# Patient Record
Sex: FEMALE | Race: WHITE | HISPANIC OR LATINO | Employment: OTHER | URBAN - METROPOLITAN AREA
[De-identification: names, ages, dates, MRNs, and addresses within clinical notes are randomized per-mention and may not be internally consistent; named-entity substitution may affect disease eponyms.]

---

## 2021-05-08 ENCOUNTER — OFFICE VISIT (OUTPATIENT)
Dept: URGENT CARE | Facility: CLINIC | Age: 61
End: 2021-05-08
Payer: MEDICARE

## 2021-05-08 VITALS
DIASTOLIC BLOOD PRESSURE: 80 MMHG | SYSTOLIC BLOOD PRESSURE: 144 MMHG | WEIGHT: 160 LBS | HEIGHT: 64 IN | TEMPERATURE: 98 F | HEART RATE: 93 BPM | RESPIRATION RATE: 14 BRPM | BODY MASS INDEX: 27.31 KG/M2 | OXYGEN SATURATION: 96 %

## 2021-05-08 DIAGNOSIS — J30.81 ALLERGIC RHINITIS DUE TO ANIMAL HAIR AND DANDER: Primary | ICD-10-CM

## 2021-05-08 LAB
CTP QC/QA: YES
SARS-COV-2 RDRP RESP QL NAA+PROBE: NEGATIVE

## 2021-05-08 PROCEDURE — 3008F BODY MASS INDEX DOCD: CPT | Mod: CPTII,S$GLB,, | Performed by: PHYSICIAN ASSISTANT

## 2021-05-08 PROCEDURE — 3008F PR BODY MASS INDEX (BMI) DOCUMENTED: ICD-10-PCS | Mod: CPTII,S$GLB,, | Performed by: PHYSICIAN ASSISTANT

## 2021-05-08 PROCEDURE — 99203 OFFICE O/P NEW LOW 30 MIN: CPT | Mod: S$GLB,CS,, | Performed by: PHYSICIAN ASSISTANT

## 2021-05-08 PROCEDURE — U0002 COVID-19 LAB TEST NON-CDC: HCPCS | Mod: QW,S$GLB,, | Performed by: PHYSICIAN ASSISTANT

## 2021-05-08 PROCEDURE — U0002: ICD-10-PCS | Mod: QW,S$GLB,, | Performed by: PHYSICIAN ASSISTANT

## 2021-05-08 PROCEDURE — 99203 PR OFFICE/OUTPT VISIT, NEW, LEVL III, 30-44 MIN: ICD-10-PCS | Mod: S$GLB,CS,, | Performed by: PHYSICIAN ASSISTANT

## 2021-05-08 RX ORDER — DENOSUMAB 60 MG/ML
60 INJECTION SUBCUTANEOUS
COMMUNITY
End: 2022-03-31

## 2021-05-08 RX ORDER — ATORVASTATIN CALCIUM 10 MG/1
10 TABLET, FILM COATED ORAL DAILY
COMMUNITY
End: 2022-03-31

## 2021-05-08 RX ORDER — ETANERCEPT 25 MG
25 KIT SUBCUTANEOUS WEEKLY
COMMUNITY
End: 2021-12-20 | Stop reason: CLARIF

## 2021-05-08 RX ORDER — FOLIC ACID 1 MG/1
1 TABLET ORAL DAILY
COMMUNITY
End: 2022-03-31

## 2021-05-08 RX ORDER — LISINOPRIL 10 MG/1
10 TABLET ORAL DAILY
COMMUNITY
End: 2022-06-13

## 2021-12-10 ENCOUNTER — HOSPITAL ENCOUNTER (OUTPATIENT)
Dept: RADIOLOGY | Facility: HOSPITAL | Age: 61
Discharge: HOME | End: 2021-12-10
Attending: INTERNAL MEDICINE
Payer: MEDICARE

## 2021-12-10 ENCOUNTER — OFFICE VISIT (OUTPATIENT)
Dept: RHEUMATOLOGY | Facility: CLINIC | Age: 61
End: 2021-12-10
Payer: MEDICARE

## 2021-12-10 VITALS
BODY MASS INDEX: 28.53 KG/M2 | HEART RATE: 74 BPM | SYSTOLIC BLOOD PRESSURE: 111 MMHG | HEIGHT: 64 IN | WEIGHT: 167.13 LBS | DIASTOLIC BLOOD PRESSURE: 70 MMHG

## 2021-12-10 DIAGNOSIS — M25.50 POLYARTHRALGIA: ICD-10-CM

## 2021-12-10 DIAGNOSIS — M25.50 POLYARTHRALGIA: Primary | ICD-10-CM

## 2021-12-10 DIAGNOSIS — R05.9 COUGH: ICD-10-CM

## 2021-12-10 DIAGNOSIS — R05.9 COUGH: Primary | ICD-10-CM

## 2021-12-10 DIAGNOSIS — M06.9 RHEUMATOID ARTHRITIS INVOLVING MULTIPLE JOINTS: ICD-10-CM

## 2021-12-10 DIAGNOSIS — M81.0 OSTEOPOROSIS, UNSPECIFIED OSTEOPOROSIS TYPE, UNSPECIFIED PATHOLOGICAL FRACTURE PRESENCE: ICD-10-CM

## 2021-12-10 PROCEDURE — 71046 X-RAY EXAM CHEST 2 VIEWS: CPT | Mod: 26,,, | Performed by: RADIOLOGY

## 2021-12-10 PROCEDURE — 71046 X-RAY EXAM CHEST 2 VIEWS: CPT | Mod: TC

## 2021-12-10 PROCEDURE — 4010F PR ACE/ARB THEARPY RXD/TAKEN: ICD-10-PCS | Mod: CPTII,S$GLB,, | Performed by: INTERNAL MEDICINE

## 2021-12-10 PROCEDURE — 77077 XR ARTHRITIS SURVEY: ICD-10-PCS | Mod: 26,,, | Performed by: RADIOLOGY

## 2021-12-10 PROCEDURE — 77077 JOINT SURVEY SINGLE VIEW: CPT | Mod: TC

## 2021-12-10 PROCEDURE — 71046 XR CHEST PA AND LATERAL: ICD-10-PCS | Mod: 26,,, | Performed by: RADIOLOGY

## 2021-12-10 PROCEDURE — 4010F ACE/ARB THERAPY RXD/TAKEN: CPT | Mod: CPTII,S$GLB,, | Performed by: INTERNAL MEDICINE

## 2021-12-10 PROCEDURE — 99999 PR PBB SHADOW E&M-EST. PATIENT-LVL IV: ICD-10-PCS | Mod: PBBFAC,,, | Performed by: INTERNAL MEDICINE

## 2021-12-10 PROCEDURE — 99204 OFFICE O/P NEW MOD 45 MIN: CPT | Mod: S$GLB,,, | Performed by: INTERNAL MEDICINE

## 2021-12-10 PROCEDURE — 99204 PR OFFICE/OUTPT VISIT, NEW, LEVL IV, 45-59 MIN: ICD-10-PCS | Mod: S$GLB,,, | Performed by: INTERNAL MEDICINE

## 2021-12-10 PROCEDURE — 99999 PR PBB SHADOW E&M-EST. PATIENT-LVL IV: CPT | Mod: PBBFAC,,, | Performed by: INTERNAL MEDICINE

## 2021-12-10 PROCEDURE — 77077 JOINT SURVEY SINGLE VIEW: CPT | Mod: 26,,, | Performed by: RADIOLOGY

## 2021-12-10 RX ORDER — METHOTREXATE 2.5 MG/1
7.5 TABLET ORAL
Qty: 36 TABLET | Refills: 0 | Status: SHIPPED | OUTPATIENT
Start: 2021-12-10 | End: 2022-03-10

## 2021-12-10 RX ORDER — GLUCOSAMINE/CHONDR SU A SOD 750-600 MG
1 TABLET ORAL
COMMUNITY

## 2021-12-10 RX ORDER — METOPROLOL TARTRATE 25 MG/1
1 TABLET, FILM COATED ORAL NIGHTLY
COMMUNITY
End: 2023-03-14 | Stop reason: SDUPTHER

## 2021-12-10 RX ORDER — ACETAMINOPHEN 500 MG
1 TABLET ORAL DAILY
COMMUNITY

## 2021-12-10 RX ORDER — MELATONIN 10 MG
1 CAPSULE ORAL
COMMUNITY
End: 2022-06-13

## 2021-12-10 RX ORDER — FOLIC ACID 1 MG/1
1 TABLET ORAL DAILY
Qty: 90 TABLET | Refills: 3 | Status: SHIPPED | OUTPATIENT
Start: 2021-12-10 | End: 2022-11-23

## 2021-12-10 RX ORDER — ETANERCEPT 50 MG/ML
50 SOLUTION SUBCUTANEOUS
Qty: 4 ML | Refills: 11 | OUTPATIENT
Start: 2021-12-10 | End: 2021-12-20 | Stop reason: SDUPTHER

## 2021-12-10 RX ORDER — ACETAMINOPHEN AND PHENYLEPHRINE HCL 325; 5 MG/1; MG/1
1 TABLET ORAL
COMMUNITY
End: 2024-02-22

## 2021-12-12 ENCOUNTER — PATIENT MESSAGE (OUTPATIENT)
Dept: RHEUMATOLOGY | Facility: CLINIC | Age: 61
End: 2021-12-12
Payer: MEDICARE

## 2021-12-13 DIAGNOSIS — M06.9 RHEUMATOID ARTHRITIS INVOLVING MULTIPLE JOINTS: Primary | ICD-10-CM

## 2021-12-20 ENCOUNTER — SPECIALTY PHARMACY (OUTPATIENT)
Dept: PHARMACY | Facility: CLINIC | Age: 61
End: 2021-12-20
Payer: MEDICARE

## 2021-12-20 DIAGNOSIS — M06.9 RHEUMATOID ARTHRITIS, INVOLVING UNSPECIFIED SITE, UNSPECIFIED WHETHER RHEUMATOID FACTOR PRESENT: Primary | ICD-10-CM

## 2021-12-20 RX ORDER — ETANERCEPT 50 MG/ML
50 SOLUTION SUBCUTANEOUS
Qty: 4 ML | Refills: 11 | Status: CANCELLED | OUTPATIENT
Start: 2021-12-20 | End: 2022-12-20

## 2021-12-20 RX ORDER — ETANERCEPT 50 MG/ML
50 SOLUTION SUBCUTANEOUS
Qty: 4 ML | Refills: 11 | Status: SHIPPED | OUTPATIENT
Start: 2021-12-20 | End: 2022-11-16 | Stop reason: SDUPTHER

## 2021-12-21 RX ORDER — ETANERCEPT 50 MG/ML
50 SOLUTION SUBCUTANEOUS
Qty: 4 ML | Refills: 11 | OUTPATIENT
Start: 2021-12-21 | End: 2022-12-21

## 2021-12-25 ENCOUNTER — PATIENT MESSAGE (OUTPATIENT)
Dept: ADMINISTRATIVE | Facility: OTHER | Age: 61
End: 2021-12-25
Payer: MEDICARE

## 2022-01-18 ENCOUNTER — SPECIALTY PHARMACY (OUTPATIENT)
Dept: PHARMACY | Facility: CLINIC | Age: 62
End: 2022-01-18
Payer: MEDICARE

## 2022-01-18 NOTE — TELEPHONE ENCOUNTER
Specialty Pharmacy - Refill Coordination    Specialty Medication Orders Linked to Encounter    Flowsheet Row Most Recent Value   Medication #1 etanercept (ENBREL SURECLICK) 50 mg/mL (1 mL) (Order#[id number], Rx#[id number])          Refill Questions - Documented Responses    Flowsheet Row Most Recent Value   Patient Availability and HIPAA Verification    Does patient want to proceed with activity? Yes   HIPAA/medical authority confirmed? Yes   Relationship to patient of person spoken to? Self   Refill Screening Questions    Changes to allergies? No   Changes to medications? No   New conditions since last clinic visit? No   Unplanned office visit, urgent care, ED, or hospital admission in the last 4 weeks? No   How does patient/caregiver feel medication is working? Too soon to tell   Financial problems or insurance changes? No   How many doses of your specialty medications were missed in the last 4 weeks? 0   Would patient like to speak to a pharmacist? No   When does the patient need to receive the medication? 01/26/22   Refill Delivery Questions    How will the patient receive the medication? Delivery Grace   When does the patient need to receive the medication? 01/26/22   Shipping Address Home   Address in [facility] confirmed and updated if neccessary? Yes   Expected Copay ($) 9.85   Is the patient able to afford the medication copay? Yes   Payment Method CC on file   Days supply of Refill 28   Supplies needed? No supplies needed   Refill activity completed? Yes   Refill activity plan Refill scheduled   Shipment/Pickup Date: 01/20/22          Current Outpatient Medications   Medication Sig    ascorbic acid, vitamin C, 1,000 mg TbSR Take 1 tablet by mouth.    atorvastatin (LIPITOR) 10 MG tablet Take 10 mg by mouth once daily.    biotin 10,000 mcg Cap Take 1 capsule by mouth.    cholecalciferol, vitamin D3, (VITAMIN D3) 50 mcg (2,000 unit) Cap Take 1 tablet by mouth.    denosumab (PROLIA) 60 mg/mL Syrg  Inject 60 mg into the skin.    etanercept (ENBREL SURECLICK) 50 mg/mL (1 mL) Inject 1 mL (50 mg total) into the skin every 7 days.    folic acid (FOLVITE) 1 MG tablet Take 1 mg by mouth once daily.    folic acid (FOLVITE) 1 MG tablet Take 1 tablet (1 mg total) by mouth once daily.    glucosamine HCl 1,500 mg Tab Take 1 tablet by mouth.    lisinopriL 10 MG tablet Take 10 mg by mouth once daily.    melatonin 10 mg Cap Take 1 capsule by mouth.    methotrexate (TREXALL) 10 MG tablet Take 10 mg by mouth.    methotrexate 2.5 MG Tab Take 3 tablets (7.5 mg total) by mouth every 7 days.    metoprolol tartrate (LOPRESSOR) 25 MG tablet Take 0.5 tablets by mouth.   Last reviewed on 12/10/2021 11:02 AM by Meenu Piedra MA    Review of patient's allergies indicates:  No Known Allergies Last reviewed on  12/10/2021 10:54 AM by Gama Piedra      Tasks added this encounter   2/16/2022 - Refill Call (Auto Added)   Tasks due within next 3 months   No tasks due.     Janett Horn, PharmD  [facility]  [address] [facility] [address]  Phone: [phone]  Fax: [phone]

## 2022-02-03 ENCOUNTER — TELEPHONE (OUTPATIENT)
Dept: RHEUMATOLOGY | Facility: CLINIC | Age: 62
End: 2022-02-03
Payer: MEDICARE

## 2022-02-03 DIAGNOSIS — M06.9 RHEUMATOID ARTHRITIS INVOLVING MULTIPLE JOINTS: Primary | ICD-10-CM

## 2022-02-03 NOTE — TELEPHONE ENCOUNTER
Left voicemail letting patient know Dr. Emmanuel has sent labs orders over to [facility].      ----- Message from Jeanine Emmanuel MD sent at 2/3/2022 11:57 AM CST -----  Ok labs sent to [facility].  ----- Message -----  From: Meenu Piedra MA  Sent: 2/3/2022  11:52 AM CST  To: Jeanine Emmanuel MD    Hi Dr. POLO patient stated that she wants to go to [facility] to get her labs done March 10  ----- Message -----  From: Jeanine Emmanuel MD  Sent: 2/3/2022   9:19 AM CST  To: Meenu Piedra MA    Tell her that her next labs are not due until march 10.  ORDERS PLACED.  DR. POLO  ----- Message -----  From: Meenu Piedra MA  Sent: 2/2/2022   3:46 PM CST  To: Jeanine Emmanuel MD    Please add labs for patient   ----- Message -----  From: Sarai Kunz  Sent: 2/2/2022   3:37 PM CST  To: [facility] Solorzano Staff    Pt wanted to schedule blood work not sure if Dr Chi wants labs done before appt or after Asking for  a call to schedule     [phone] (home)

## 2022-02-08 LAB — CRC RECOMMENDATION EXT: NORMAL

## 2022-02-16 ENCOUNTER — SPECIALTY PHARMACY (OUTPATIENT)
Dept: PHARMACY | Facility: CLINIC | Age: 62
End: 2022-02-16
Payer: MEDICARE

## 2022-02-16 NOTE — TELEPHONE ENCOUNTER
Specialty Pharmacy - Refill Coordination    Specialty Medication Orders Linked to Encounter    Flowsheet Row Most Recent Value   Medication #1 etanercept (ENBREL SURECLICK) 50 mg/mL (1 mL) (Order#[id number], Rx#[id number])          Refill Questions - Documented Responses    Flowsheet Row Most Recent Value   Patient Availability and HIPAA Verification    Does patient want to proceed with activity? Yes   HIPAA/medical authority confirmed? Yes   Relationship to patient of person spoken to? Self   Refill Screening Questions    Changes to allergies? No   Changes to medications? No   New conditions since last clinic visit? No   Unplanned office visit, urgent care, ED, or hospital admission in the last 4 weeks? No   How does patient/caregiver feel medication is working? Good   Financial problems or insurance changes? No   How many doses of your specialty medications were missed in the last 4 weeks? 0   Would patient like to speak to a pharmacist? No   When does the patient need to receive the medication? 02/23/22   Refill Delivery Questions    How will the patient receive the medication? Delivery Grace   When does the patient need to receive the medication? 02/23/22   Shipping Address Home   Address in [facility] confirmed and updated if neccessary? Yes   Expected Copay ($) 9.85   Is the patient able to afford the medication copay? Yes   Payment Method CC on file   Days supply of Refill 28   Supplies needed? No supplies needed   Refill activity completed? Yes   Refill activity plan Refill scheduled   Shipment/Pickup Date: 02/21/22          Current Outpatient Medications   Medication Sig    ascorbic acid, vitamin C, 1,000 mg TbSR Take 1 tablet by mouth.    atorvastatin (LIPITOR) 10 MG tablet Take 10 mg by mouth once daily.    biotin 10,000 mcg Cap Take 1 capsule by mouth.    cholecalciferol, vitamin D3, (VITAMIN D3) 50 mcg (2,000 unit) Cap Take 1 tablet by mouth.    denosumab (PROLIA) 60 mg/mL Syrg Inject 60 mg  into the skin.    etanercept (ENBREL SURECLICK) 50 mg/mL (1 mL) Inject 1 mL (50 mg total) into the skin every 7 days.    folic acid (FOLVITE) 1 MG tablet Take 1 mg by mouth once daily.    folic acid (FOLVITE) 1 MG tablet Take 1 tablet (1 mg total) by mouth once daily.    glucosamine HCl 1,500 mg Tab Take 1 tablet by mouth.    lisinopriL 10 MG tablet Take 10 mg by mouth once daily.    melatonin 10 mg Cap Take 1 capsule by mouth.    methotrexate (TREXALL) 10 MG tablet Take 10 mg by mouth.    methotrexate 2.5 MG Tab Take 3 tablets (7.5 mg total) by mouth every 7 days.    metoprolol tartrate (LOPRESSOR) 25 MG tablet Take 0.5 tablets by mouth.   Last reviewed on 12/10/2021 11:02 AM by Meenu Piedra MA    Review of patient's allergies indicates:  No Known Allergies Last reviewed on  12/10/2021 10:54 AM by Gama Piedra      Tasks added this encounter   3/16/2022 - Refill Call (Auto Added)   Tasks due within next 3 months   No tasks due.     [facility]  [address] [facility] [address]  Phone: [phone]  Fax: [phone]

## 2022-02-22 LAB — BCS RECOMMENDATION EXT: NORMAL

## 2022-03-11 LAB
ALBUMIN SERPL-MCNC: 4.5 G/DL (ref 3.6–5.1)
ALBUMIN/GLOB SERPL: 1.6 (CALC) (ref 1–2.5)
ALP SERPL-CCNC: 68 U/L (ref 37–153)
ALT SERPL-CCNC: 22 U/L (ref 6–29)
AST SERPL-CCNC: 18 U/L (ref 10–35)
BASOPHILS # BLD AUTO: 50 CELLS/UL (ref 0–200)
BASOPHILS NFR BLD AUTO: 0.6 %
BILIRUB SERPL-MCNC: 0.3 MG/DL (ref 0.2–1.2)
BUN SERPL-MCNC: 17 MG/DL (ref 7–25)
BUN/CREAT SERPL: NORMAL (CALC) (ref 6–22)
CALCIUM SERPL-MCNC: 9.9 MG/DL (ref 8.6–10.4)
CHLORIDE SERPL-SCNC: 103 MMOL/L (ref 98–110)
CO2 SERPL-SCNC: 28 MMOL/L (ref 20–32)
CREAT SERPL-MCNC: 0.66 MG/DL (ref 0.5–0.99)
CRP SERPL-MCNC: 0.4 MG/L
EOSINOPHIL # BLD AUTO: 332 CELLS/UL (ref 15–500)
EOSINOPHIL NFR BLD AUTO: 4 %
ERYTHROCYTE [DISTWIDTH] IN BLOOD BY AUTOMATED COUNT: 13.7 % (ref 11–15)
ERYTHROCYTE [SEDIMENTATION RATE] IN BLOOD BY WESTERGREN METHOD: 11 MM/H
GLOBULIN SER CALC-MCNC: 2.9 G/DL (CALC) (ref 1.9–3.7)
GLUCOSE SERPL-MCNC: 89 MG/DL (ref 65–99)
HCT VFR BLD AUTO: 38 % (ref 35–45)
HGB BLD-MCNC: 12.9 G/DL (ref 11.7–15.5)
LYMPHOCYTES # BLD AUTO: 2606 CELLS/UL (ref 850–3900)
LYMPHOCYTES NFR BLD AUTO: 31.4 %
MCH RBC QN AUTO: 30.8 PG (ref 27–33)
MCHC RBC AUTO-ENTMCNC: 33.9 G/DL (ref 32–36)
MCV RBC AUTO: 90.7 FL (ref 80–100)
MONOCYTES # BLD AUTO: 730 CELLS/UL (ref 200–950)
MONOCYTES NFR BLD AUTO: 8.8 %
NEUTROPHILS # BLD AUTO: 4582 CELLS/UL (ref 1500–7800)
NEUTROPHILS NFR BLD AUTO: 55.2 %
PLATELET # BLD AUTO: 240 THOUSAND/UL (ref 140–400)
PMV BLD REES-ECKER: 11.2 FL (ref 7.5–12.5)
POTASSIUM SERPL-SCNC: 4 MMOL/L (ref 3.5–5.3)
PROT SERPL-MCNC: 7.4 G/DL (ref 6.1–8.1)
RBC # BLD AUTO: 4.19 MILLION/UL (ref 3.8–5.1)
SODIUM SERPL-SCNC: 140 MMOL/L (ref 135–146)
WBC # BLD AUTO: 8.3 THOUSAND/UL (ref 3.8–10.8)

## 2022-03-16 ENCOUNTER — SPECIALTY PHARMACY (OUTPATIENT)
Dept: PHARMACY | Facility: CLINIC | Age: 62
End: 2022-03-16
Payer: MEDICARE

## 2022-03-16 NOTE — TELEPHONE ENCOUNTER
Specialty Pharmacy - Refill Coordination    Specialty Medication Orders Linked to Encounter    Flowsheet Row Most Recent Value   Medication #1 etanercept (ENBREL SURECLICK) 50 mg/mL (1 mL) (Order#[id number], Rx#[id number])          Refill Questions - Documented Responses    Flowsheet Row Most Recent Value   Patient Availability and HIPAA Verification    Does patient want to proceed with activity? Yes   HIPAA/medical authority confirmed? Yes   Relationship to patient of person spoken to? Self   Refill Screening Questions    Changes to allergies? No   Changes to medications? No   New conditions since last clinic visit? No   Unplanned office visit, urgent care, ED, or hospital admission in the last 4 weeks? No   How does patient/caregiver feel medication is working? Good   Financial problems or insurance changes? No   How many doses of your specialty medications were missed in the last 4 weeks? 0   Would patient like to speak to a pharmacist? No   When does the patient need to receive the medication? 03/23/22   Refill Delivery Questions    How will the patient receive the medication? Delivery Grace   When does the patient need to receive the medication? 03/23/22   Shipping Address Home   Address in [facility] confirmed and updated if neccessary? Yes   Expected Copay ($) 0   Is the patient able to afford the medication copay? Yes   Payment Method zero copay   Days supply of Refill 28   Supplies needed? No supplies needed   Refill activity completed? Yes   Refill activity plan Refill scheduled   Shipment/Pickup Date: 03/21/22          Current Outpatient Medications   Medication Sig    ascorbic acid, vitamin C, 1,000 mg TbSR Take 1 tablet by mouth.    atorvastatin (LIPITOR) 10 MG tablet Take 10 mg by mouth once daily.    biotin 10,000 mcg Cap Take 1 capsule by mouth.    cholecalciferol, vitamin D3, (VITAMIN D3) 50 mcg (2,000 unit) Cap Take 1 tablet by mouth.    denosumab (PROLIA) 60 mg/mL Syrg Inject 60 mg into  the skin.    etanercept (ENBREL SURECLICK) 50 mg/mL (1 mL) Inject 1 mL (50 mg total) into the skin every 7 days.    folic acid (FOLVITE) 1 MG tablet Take 1 mg by mouth once daily.    folic acid (FOLVITE) 1 MG tablet Take 1 tablet (1 mg total) by mouth once daily.    glucosamine HCl 1,500 mg Tab Take 1 tablet by mouth.    lisinopriL 10 MG tablet Take 10 mg by mouth once daily.    melatonin 10 mg Cap Take 1 capsule by mouth.    methotrexate (TREXALL) 10 MG tablet Take 10 mg by mouth.    metoprolol tartrate (LOPRESSOR) 25 MG tablet Take 0.5 tablets by mouth.   Last reviewed on 12/10/2021 11:02 AM by Meenu Piedra MA    Review of patient's allergies indicates:  No Known Allergies Last reviewed on  12/10/2021 10:54 AM by Gama Piedra      Tasks added this encounter   No tasks added.   Tasks due within next 3 months   3/16/2022 - Refill Call (Auto Added)     [facility]  [address] [place] [address]  Phone: [phone]  Fax: [phone]

## 2022-03-31 ENCOUNTER — OFFICE VISIT (OUTPATIENT)
Dept: RHEUMATOLOGY | Facility: CLINIC | Age: 62
End: 2022-03-31
Payer: MEDICARE

## 2022-03-31 VITALS
BODY MASS INDEX: 28.49 KG/M2 | HEART RATE: 74 BPM | DIASTOLIC BLOOD PRESSURE: 82 MMHG | WEIGHT: 166.88 LBS | HEIGHT: 64 IN | SYSTOLIC BLOOD PRESSURE: 121 MMHG

## 2022-03-31 DIAGNOSIS — M06.9 RHEUMATOID ARTHRITIS FLARE: Primary | ICD-10-CM

## 2022-03-31 PROCEDURE — 99214 OFFICE O/P EST MOD 30 MIN: CPT | Mod: S$GLB,,, | Performed by: INTERNAL MEDICINE

## 2022-03-31 PROCEDURE — 99999 PR PBB SHADOW E&M-EST. PATIENT-LVL IV: ICD-10-PCS | Mod: PBBFAC,,, | Performed by: INTERNAL MEDICINE

## 2022-03-31 PROCEDURE — 3074F PR MOST RECENT SYSTOLIC BLOOD PRESSURE < 130 MM HG: ICD-10-PCS | Mod: CPTII,S$GLB,, | Performed by: INTERNAL MEDICINE

## 2022-03-31 PROCEDURE — 3079F PR MOST RECENT DIASTOLIC BLOOD PRESSURE 80-89 MM HG: ICD-10-PCS | Mod: CPTII,S$GLB,, | Performed by: INTERNAL MEDICINE

## 2022-03-31 PROCEDURE — 3008F BODY MASS INDEX DOCD: CPT | Mod: CPTII,S$GLB,, | Performed by: INTERNAL MEDICINE

## 2022-03-31 PROCEDURE — 3008F PR BODY MASS INDEX (BMI) DOCUMENTED: ICD-10-PCS | Mod: CPTII,S$GLB,, | Performed by: INTERNAL MEDICINE

## 2022-03-31 PROCEDURE — 3079F DIAST BP 80-89 MM HG: CPT | Mod: CPTII,S$GLB,, | Performed by: INTERNAL MEDICINE

## 2022-03-31 PROCEDURE — 99214 PR OFFICE/OUTPT VISIT, EST, LEVL IV, 30-39 MIN: ICD-10-PCS | Mod: S$GLB,,, | Performed by: INTERNAL MEDICINE

## 2022-03-31 PROCEDURE — 3074F SYST BP LT 130 MM HG: CPT | Mod: CPTII,S$GLB,, | Performed by: INTERNAL MEDICINE

## 2022-03-31 PROCEDURE — 1159F MED LIST DOCD IN RCRD: CPT | Mod: CPTII,S$GLB,, | Performed by: INTERNAL MEDICINE

## 2022-03-31 PROCEDURE — 1159F PR MEDICATION LIST DOCUMENTED IN MEDICAL RECORD: ICD-10-PCS | Mod: CPTII,S$GLB,, | Performed by: INTERNAL MEDICINE

## 2022-03-31 PROCEDURE — 99999 PR PBB SHADOW E&M-EST. PATIENT-LVL IV: CPT | Mod: PBBFAC,,, | Performed by: INTERNAL MEDICINE

## 2022-03-31 RX ORDER — ATORVASTATIN CALCIUM 20 MG/1
20 TABLET, FILM COATED ORAL
COMMUNITY
End: 2023-03-14 | Stop reason: SDUPTHER

## 2022-03-31 RX ORDER — AMOXICILLIN 500 MG
1 CAPSULE ORAL
COMMUNITY
End: 2023-03-14 | Stop reason: DRUGHIGH

## 2022-03-31 RX ORDER — FOLIC ACID 1 MG/1
1 TABLET ORAL
COMMUNITY
End: 2022-03-31

## 2022-03-31 RX ORDER — ETANERCEPT 50 MG/ML
SOLUTION SUBCUTANEOUS
COMMUNITY
End: 2022-03-31

## 2022-03-31 RX ORDER — ADALIMUMAB 40MG/0.4ML
40 KIT SUBCUTANEOUS
Qty: 2 PEN | Refills: 11 | OUTPATIENT
Start: 2022-03-31 | End: 2022-03-31 | Stop reason: ALTCHOICE

## 2022-03-31 RX ORDER — LISINOPRIL 2.5 MG/1
5 TABLET ORAL DAILY
COMMUNITY
End: 2022-12-29 | Stop reason: SDUPTHER

## 2022-03-31 RX ORDER — ONDANSETRON 8 MG/1
TABLET, ORALLY DISINTEGRATING ORAL
COMMUNITY
Start: 2021-11-16 | End: 2024-02-22

## 2022-03-31 RX ORDER — MULTIVITAMIN
1 TABLET ORAL
COMMUNITY
End: 2023-03-14 | Stop reason: DRUGHIGH

## 2022-03-31 ASSESSMENT — ROUTINE ASSESSMENT OF PATIENT INDEX DATA (RAPID3)
TOTAL RAPID3 SCORE: 0.44
PAIN SCORE: 1
AM STIFFNESS SCORE: 0, NO
MDHAQ FUNCTION SCORE: 0.1
PATIENT GLOBAL ASSESSMENT SCORE: 0
PSYCHOLOGICAL DISTRESS SCORE: 1.1
FATIGUE SCORE: 0

## 2022-03-31 NOTE — PROGRESS NOTES
Subjective:       Patient ID: Earnestine Petit is a 61 y.o. female.    Chief Complaint: Disease Management    HPI 61 year old F with PMH of RA (around age 57), osteoporosis, palpitations here for evaluation.  When she was fist diagnosed with RA, she had involvement of shoulders, elbows, hands,wrists, knees, ankle, and feet.  She was started on MTX and prednisone at time of diagnosis.  She is taking MTX 3 pills once a week for a year and also on enbrel for over 3 years. She is taking folic acid 1 mg a day.  She has mild pain in right knee, right wrist, and some pain in hands with making a fist. She also has pain in feet. She reports she gets swelling in left second finger and other fingers on left hand. On occasion, her right knee will get swollen.  Resting improves the pain. Denies any rashes, oral ulcers, hair loss, fevers,or photosensitivity. She smoked when she was 14 just socially.      She is on prolia for a year and half for osteoporosis. She was on avista for 3 years.  She has had osteoporosis since age 52.    Family hx: sister: RA  Aunt: RA    Interval history: She is taking MTX 3 pills once a week. She is taking folic acid  1 mg a day.  She is on enbrel. She is having pain and swelling in both hands, worse on the left.  She is also having swelling in the feet.      Past Medical History:   Diagnosis Date    Arthritis     RH    Hyperlipidemia     Hypertension     Osteoporosis        Review of Systems   Constitutional: Negative for activity change, appetite change, chills, diaphoresis, fatigue, fever and unexpected weight change.   HENT: Negative for congestion, ear discharge, ear pain, facial swelling, mouth sores, sinus pressure, sneezing, sore throat, tinnitus and trouble swallowing.    Eyes: Negative for photophobia, pain, discharge, redness, itching and visual disturbance.   Respiratory: Negative for apnea, cough, chest tightness, shortness of breath, wheezing and stridor.    Cardiovascular: Negative  for chest pain and leg swelling.   Gastrointestinal: Negative for abdominal distention, abdominal pain, anal bleeding, blood in stool, constipation, diarrhea and nausea.   Endocrine: Negative for cold intolerance and heat intolerance.   Genitourinary: Negative for difficulty urinating, dysuria and genital sores.   Musculoskeletal: Positive for arthralgias. Negative for back pain, gait problem, joint swelling, myalgias, neck pain and neck stiffness.   Skin: Negative for color change, pallor, rash and wound.   Neurological: Negative for dizziness, seizures, light-headedness, numbness and headaches.   Hematological: Negative for adenopathy. Does not bruise/bleed easily.   Psychiatric/Behavioral: Negative for sleep disturbance. The patient is not nervous/anxious.            Objective:        Physical Exam   Constitutional: She is oriented to person, place, and time.   HENT:   Head: Normocephalic and atraumatic.   Right Ear: External ear normal.   Left Ear: External ear normal.   Nose: Nose normal.   Mouth/Throat: Oropharynx is clear and moist. No oropharyngeal exudate.   Eyes: Conjunctivae and EOM are normal. Pupils are equal, round, and reactive to light. Right eye exhibits no discharge. Left eye exhibits no discharge. No scleral icterus.   Neck: No JVD present. No thyromegaly present.   Cardiovascular: Normal rate, regular rhythm, normal heart sounds and intact distal pulses.  Exam reveals no gallop and no friction rub.    No murmur heard.  Pulmonary/Chest: Effort normal and breath sounds normal. No respiratory distress. She has no wheezes. She has no rales. She exhibits no tenderness.   Abdominal: Soft. Bowel sounds are normal. She exhibits no distension and no mass. There is no abdominal tenderness. There is no rebound and no guarding.   Lymphadenopathy:     She has no cervical adenopathy.   Neurological: She is alert and oriented to person, place, and time. No cranial nerve deficit. Gait normal. Coordination  normal.   Skin: Skin is dry. No rash noted. No erythema. No pallor.     Psychiatric: Affect and judgment normal.   Musculoskeletal: Deformity present. No tenderness or edema.           Assessment:     61 year old F with PMH of RA (around age 57), osteoporosis, palpitations here for evaluation.   She is on MTX 3 pills once a week and enbrel and is flaring. I discussed switching to Humira but she declines and understands risk of progressive joint damage.    Plan:       Problem List Items Addressed This Visit    None       Labs  Offered steroids but she declined  Continue MTX 3 pills once week (Risks and benefits of initiating MTX discussed. Patient aware that risks include and not limited to lung toxicity, liver abnormalities, cell count abnormalities, malignancy, and allergic  reaction to medication. Patient agrees with starting medication.  Continue folic acid 1 mg po qday    *  30 * minutes of total time spent on the encounter, which includes face to face time and non-face to face time preparing to see the patient (eg, review of tests), Obtaining and/or reviewing separately obtained history, Documenting clinical information in the electronic or other health record, Independently interpreting results (not separately reported) and communicating results to the patient/family/caregiver, or Care coordination (not separately reported).

## 2022-03-31 NOTE — PROGRESS NOTES
Rapid3 Question Responses and Scores 3/30/2022   MDHAQ Score 0.1   Psychologic Score 1.1   Pain Score 1   When you awakened in the morning OVER THE LAST WEEK, did you feel stiff? No   If Yes, please indicate the number of hours until you are as limber as you will be for the day -   Fatigue Score 0   Global Health Score 0   RAPID3 Score 0.44     Answers for HPI/ROS submitted by the patient on 3/30/2022  fever: No  eye redness: No  mouth sores: No  headaches: No  shortness of breath: No  chest pain: No  trouble swallowing: No  diarrhea: No  constipation: No  unexpected weight change: No  genital sore: No  dysuria: No  During the last 3 days, have you had a skin rash?: No  Bruises or bleeds easily: No  cough: No

## 2022-04-01 ENCOUNTER — PATIENT MESSAGE (OUTPATIENT)
Dept: RHEUMATOLOGY | Facility: CLINIC | Age: 62
End: 2022-04-01
Payer: MEDICARE

## 2022-04-01 DIAGNOSIS — M06.9 RHEUMATOID ARTHRITIS FLARE: Primary | ICD-10-CM

## 2022-04-14 ENCOUNTER — SPECIALTY PHARMACY (OUTPATIENT)
Dept: PHARMACY | Facility: CLINIC | Age: 62
End: 2022-04-14
Payer: MEDICARE

## 2022-04-14 ENCOUNTER — PATIENT MESSAGE (OUTPATIENT)
Dept: RHEUMATOLOGY | Facility: CLINIC | Age: 62
End: 2022-04-14
Payer: MEDICARE

## 2022-04-14 ENCOUNTER — PATIENT MESSAGE (OUTPATIENT)
Dept: PHARMACY | Facility: CLINIC | Age: 62
End: 2022-04-14
Payer: MEDICARE

## 2022-04-14 DIAGNOSIS — M06.9 RHEUMATOID ARTHRITIS, INVOLVING UNSPECIFIED SITE, UNSPECIFIED WHETHER RHEUMATOID FACTOR PRESENT: Primary | ICD-10-CM

## 2022-04-14 NOTE — TELEPHONE ENCOUNTER
Specialty Pharmacy - Refill Coordination    Specialty Medication Orders Linked to Encounter    Flowsheet Row Most Recent Value   Medication #1 etanercept (ENBREL SURECLICK) 50 mg/mL (1 mL) (Order#[id number], Rx#[id number])          Refill Questions - Documented Responses    Flowsheet Row Most Recent Value   Patient Availability and HIPAA Verification    Does patient want to proceed with activity? Yes   HIPAA/medical authority confirmed? Yes   Relationship to patient of person spoken to? Self   Refill Screening Questions    Changes to allergies? No   Changes to medications? No   New conditions since last clinic visit? No   Unplanned office visit, urgent care, ED, or hospital admission in the last 4 weeks? No   How does patient/caregiver feel medication is working? Good   Financial problems or insurance changes? No   How many doses of your specialty medications were missed in the last 4 weeks? 0   Would patient like to speak to a pharmacist? No   When does the patient need to receive the medication? 04/20/22   Refill Delivery Questions    How will the patient receive the medication? Delivery Grace   When does the patient need to receive the medication? 04/20/22   Shipping Address Home   Address in [facility] confirmed and updated if neccessary? Yes   Expected Copay ($) 0   Is the patient able to afford the medication copay? Yes   Payment Method zero copay   Days supply of Refill 28   Supplies needed? No supplies needed   Refill activity completed? Yes   Refill activity plan Refill scheduled   Shipment/Pickup Date: 04/19/22          Current Outpatient Medications   Medication Sig    ascorbic acid, vitamin C, 1,000 mg TbSR Take 1 tablet by mouth.    atorvastatin (LIPITOR) 20 MG tablet Take 20 mg by mouth.    biotin 10,000 mcg Cap Take 1 capsule by mouth.    cholecalciferol, vitamin D3, (VITAMIN D3) 50 mcg (2,000 unit) Cap Take 1 tablet by mouth.    denosumab (PROLIA) 60 mg/mL Syrg Inject 60 mg into the skin.     etanercept (ENBREL SURECLICK) 50 mg/mL (1 mL) Inject 1 mL (50 mg total) into the skin every 7 days.    ferrous fumarate/vit Bcomp,C (SUPER B COMPLEX ORAL) Take 1 tablet by mouth.    FLAXSEED ORAL 1 (one) time each day    folic acid (FOLVITE) 1 MG tablet Take 1 tablet (1 mg total) by mouth once daily.    glucosamine HCl 1,500 mg Tab Take 1 tablet by mouth.    INOSITOL ORAL Take by mouth.    Lactobac no.41/Bifidobact no.7 (PROBIOTIC-10 ORAL) Take 1 capsule by mouth.    lisinopriL (PRINIVIL,ZESTRIL) 2.5 MG tablet Take 2.5 mg by mouth once daily.    lisinopriL 10 MG tablet Take 10 mg by mouth once daily.    melatonin 10 mg Cap Take 1 capsule by mouth.    methotrexate (TREXALL) 10 MG tablet Take 10 mg by mouth.    metoprolol tartrate (LOPRESSOR) 25 MG tablet Take 0.5 tablets by mouth.    multivitamin (THERAGRAN) per tablet Take 1 capsule by mouth.    omega-3 fatty acids/fish oil (FISH OIL-OMEGA-3 FATTY ACIDS) 300-1,000 mg capsule Take 1 capsule by mouth.    ondansetron (ZOFRAN-ODT) 8 MG TbDL DISSOLVE 1 TABLET BY MOUTH EVERY EIGHT HOURS  AS NEEDED FOR NAUSEA AND VOMOTING   Last reviewed on 3/31/2022  3:05 PM by Corinne Cundiff, MA    Review of patient's allergies indicates:  No Known Allergies Last reviewed on  3/31/2022 2:57 PM by Corinne Cundiff      Tasks added this encounter   5/11/2022 - Refill Call (Auto Added)   Tasks due within next 3 months   No tasks due.     Rajesh Higgins, PharmD  [facility]  [address] [place] [id number]  Phone: [phone]  Fax: [phone]

## 2022-04-18 ENCOUNTER — PATIENT MESSAGE (OUTPATIENT)
Dept: ADMINISTRATIVE | Facility: OTHER | Age: 62
End: 2022-04-18
Payer: MEDICARE

## 2022-04-18 DIAGNOSIS — M06.9 RHEUMATOID ARTHRITIS INVOLVING MULTIPLE JOINTS: Primary | ICD-10-CM

## 2022-04-18 RX ORDER — METHOTREXATE 2.5 MG/1
7.5 TABLET ORAL
Qty: 12 TABLET | Refills: 3 | Status: SHIPPED | OUTPATIENT
Start: 2022-04-18 | End: 2022-10-20 | Stop reason: SDUPTHER

## 2022-05-06 ENCOUNTER — PATIENT MESSAGE (OUTPATIENT)
Dept: RHEUMATOLOGY | Facility: CLINIC | Age: 62
End: 2022-05-06
Payer: MEDICARE

## 2022-05-06 DIAGNOSIS — M06.9 RHEUMATOID ARTHRITIS INVOLVING MULTIPLE JOINTS: Primary | ICD-10-CM

## 2022-05-11 ENCOUNTER — PATIENT MESSAGE (OUTPATIENT)
Dept: PHARMACY | Facility: CLINIC | Age: 62
End: 2022-05-11
Payer: MEDICARE

## 2022-05-11 ENCOUNTER — SPECIALTY PHARMACY (OUTPATIENT)
Dept: PHARMACY | Facility: CLINIC | Age: 62
End: 2022-05-11
Payer: MEDICARE

## 2022-05-11 NOTE — TELEPHONE ENCOUNTER
Specialty Pharmacy - Refill Coordination    Specialty Medication Orders Linked to Encounter    Flowsheet Row Most Recent Value   Medication #1 etanercept (ENBREL SURECLICK) 50 mg/mL (1 mL) (Order#[id number], Rx#[id number])          Refill Questions - Documented Responses    Flowsheet Row Most Recent Value   Patient Availability and HIPAA Verification    Does patient want to proceed with activity? Yes   HIPAA/medical authority confirmed? Yes   Relationship to patient of person spoken to? Self   Refill Screening Questions    Changes to allergies? No   Changes to medications? No   New conditions since last clinic visit? No   Unplanned office visit, urgent care, ED, or hospital admission in the last 4 weeks? No   How does patient/caregiver feel medication is working? Excellent   Financial problems or insurance changes? No   How many doses of your specialty medications were missed in the last 4 weeks? 0   Would patient like to speak to a pharmacist? No   When does the patient need to receive the medication? 05/18/22   Refill Delivery Questions    How will the patient receive the medication? Delivery Grace   When does the patient need to receive the medication? 05/18/22   Shipping Address Home   Address in [place] confirmed and updated if neccessary? Yes   Expected Copay ($) 0   Is the patient able to afford the medication copay? Yes   Payment Method zero copay   Days supply of Refill 28   Supplies needed? Alcohol Swabs   Refill activity completed? Yes   Refill activity plan Refill scheduled   Shipment/Pickup Date: 05/16/22          Current Outpatient Medications   Medication Sig    ascorbic acid, vitamin C, 1,000 mg TbSR Take 1 tablet by mouth.    atorvastatin (LIPITOR) 20 MG tablet Take 20 mg by mouth.    biotin 10,000 mcg Cap Take 1 capsule by mouth.    cholecalciferol, vitamin D3, (VITAMIN D3) 50 mcg (2,000 unit) Cap Take 1 tablet by mouth.    denosumab (PROLIA) 60 mg/mL Syrg Inject 60 mg into the skin.     etanercept (ENBREL SURECLICK) 50 mg/mL (1 mL) Inject 1 mL (50 mg total) into the skin every 7 days.    ferrous fumarate/vit Bcomp,C (SUPER B COMPLEX ORAL) Take 1 tablet by mouth.    FLAXSEED ORAL 1 (one) time each day    folic acid (FOLVITE) 1 MG tablet Take 1 tablet (1 mg total) by mouth once daily.    glucosamine HCl 1,500 mg Tab Take 1 tablet by mouth.    INOSITOL ORAL Take by mouth.    Lactobac no.41/Bifidobact no.7 (PROBIOTIC-10 ORAL) Take 1 capsule by mouth.    lisinopriL (PRINIVIL,ZESTRIL) 2.5 MG tablet Take 2.5 mg by mouth once daily.    lisinopriL 10 MG tablet Take 10 mg by mouth once daily.    melatonin 10 mg Cap Take 1 capsule by mouth.    methotrexate (TREXALL) 10 MG tablet Take 10 mg by mouth.    methotrexate 2.5 MG Tab Take 3 tablets (7.5 mg total) by mouth every 7 days.    metoprolol tartrate (LOPRESSOR) 25 MG tablet Take 0.5 tablets by mouth.    multivitamin (THERAGRAN) per tablet Take 1 capsule by mouth.    omega-3 fatty acids/fish oil (FISH OIL-OMEGA-3 FATTY ACIDS) 300-1,000 mg capsule Take 1 capsule by mouth.    ondansetron (ZOFRAN-ODT) 8 MG TbDL DISSOLVE 1 TABLET BY MOUTH EVERY EIGHT HOURS  AS NEEDED FOR NAUSEA AND VOMOTING   Last reviewed on 3/31/2022  3:05 PM by Corinne Cundiff, MA    Review of patient's allergies indicates:  No Known Allergies Last reviewed on  4/18/2022 11:34 AM by Jeanine Emmanuel      Tasks added this encounter   6/8/2022 - Refill Call (Auto Added)   Tasks due within next 3 months   No tasks due.     Patrick Monroe, PharmD  [facility]  [address] [place] [id number]  Phone: [phone]  Fax: [phone]

## 2022-06-02 ENCOUNTER — TELEPHONE (OUTPATIENT)
Dept: FAMILY MEDICINE | Facility: CLINIC | Age: 62
End: 2022-06-02
Payer: MEDICARE

## 2022-06-02 NOTE — TELEPHONE ENCOUNTER
----- Message from Gonzales Mcconnell sent at 6/1/2022  4:08 PM CDT -----  Contact: New  Type:  Patient Returning Call    Who Called:New Pt  Would the patient rather a call back or a response via itembasener? call  Best Call Back Number:[phone]  Additional Information:     New pt would like to est care  Referred by dr lopez  Books were closed until October

## 2022-06-07 LAB
ALBUMIN SERPL-MCNC: 4.4 G/DL (ref 3.6–5.1)
ALBUMIN/GLOB SERPL: 1.6 (CALC) (ref 1–2.5)
ALP SERPL-CCNC: 68 U/L (ref 37–153)
ALT SERPL-CCNC: 26 U/L (ref 6–29)
AST SERPL-CCNC: 25 U/L (ref 10–35)
BASOPHILS # BLD AUTO: 58 CELLS/UL (ref 0–200)
BASOPHILS NFR BLD AUTO: 0.7 %
BILIRUB SERPL-MCNC: 0.4 MG/DL (ref 0.2–1.2)
BUN SERPL-MCNC: 18 MG/DL (ref 7–25)
BUN/CREAT SERPL: ABNORMAL (CALC) (ref 6–22)
CALCIUM SERPL-MCNC: 9.5 MG/DL (ref 8.6–10.4)
CHLORIDE SERPL-SCNC: 105 MMOL/L (ref 98–110)
CO2 SERPL-SCNC: 27 MMOL/L (ref 20–32)
CREAT SERPL-MCNC: 0.63 MG/DL (ref 0.5–0.99)
CRP SERPL-MCNC: 0.4 MG/L
EOSINOPHIL # BLD AUTO: 208 CELLS/UL (ref 15–500)
EOSINOPHIL NFR BLD AUTO: 2.5 %
ERYTHROCYTE [DISTWIDTH] IN BLOOD BY AUTOMATED COUNT: 13 % (ref 11–15)
ERYTHROCYTE [SEDIMENTATION RATE] IN BLOOD BY WESTERGREN METHOD: 9 MM/H
GLOBULIN SER CALC-MCNC: 2.7 G/DL (CALC) (ref 1.9–3.7)
GLUCOSE SERPL-MCNC: 111 MG/DL (ref 65–99)
HCT VFR BLD AUTO: 40.3 % (ref 35–45)
HGB BLD-MCNC: 13.6 G/DL (ref 11.7–15.5)
LYMPHOCYTES # BLD AUTO: 3005 CELLS/UL (ref 850–3900)
LYMPHOCYTES NFR BLD AUTO: 36.2 %
MCH RBC QN AUTO: 31.7 PG (ref 27–33)
MCHC RBC AUTO-ENTMCNC: 33.7 G/DL (ref 32–36)
MCV RBC AUTO: 93.9 FL (ref 80–100)
MONOCYTES # BLD AUTO: 706 CELLS/UL (ref 200–950)
MONOCYTES NFR BLD AUTO: 8.5 %
NEUTROPHILS # BLD AUTO: 4324 CELLS/UL (ref 1500–7800)
NEUTROPHILS NFR BLD AUTO: 52.1 %
PLATELET # BLD AUTO: 303 THOUSAND/UL (ref 140–400)
PMV BLD REES-ECKER: 10.3 FL (ref 7.5–12.5)
POTASSIUM SERPL-SCNC: 4.7 MMOL/L (ref 3.5–5.3)
PROT SERPL-MCNC: 7.1 G/DL (ref 6.1–8.1)
RBC # BLD AUTO: 4.29 MILLION/UL (ref 3.8–5.1)
SODIUM SERPL-SCNC: 137 MMOL/L (ref 135–146)
WBC # BLD AUTO: 8.3 THOUSAND/UL (ref 3.8–10.8)

## 2022-06-08 ENCOUNTER — PATIENT MESSAGE (OUTPATIENT)
Dept: PHARMACY | Facility: CLINIC | Age: 62
End: 2022-06-08
Payer: MEDICARE

## 2022-06-09 ENCOUNTER — SPECIALTY PHARMACY (OUTPATIENT)
Dept: PHARMACY | Facility: CLINIC | Age: 62
End: 2022-06-09
Payer: MEDICARE

## 2022-06-09 NOTE — TELEPHONE ENCOUNTER
Specialty Pharmacy - Refill Coordination    Specialty Medication Orders Linked to Encounter    Flowsheet Row Most Recent Value   Medication #1 etanercept (ENBREL SURECLICK) 50 mg/mL (1 mL) (Order#[id number], Rx#[id number])          Refill Questions - Documented Responses    Flowsheet Row Most Recent Value   Patient Availability and HIPAA Verification    Does patient want to proceed with activity? Yes   HIPAA/medical authority confirmed? Yes   Relationship to patient of person spoken to? Self   Refill Screening Questions    Changes to allergies? No   Changes to medications? No   New conditions since last clinic visit? No   Unplanned office visit, urgent care, ED, or hospital admission in the last 4 weeks? No   How does patient/caregiver feel medication is working? Good   Financial problems or insurance changes? No   How many doses of your specialty medications were missed in the last 4 weeks? 0   Would patient like to speak to a pharmacist? No   When does the patient need to receive the medication? 06/15/22   Refill Delivery Questions    How will the patient receive the medication? Delivery Grace   When does the patient need to receive the medication? 06/15/22   Shipping Address Home   Address in [facility] confirmed and updated if neccessary? Yes   Expected Copay ($) 0   Is the patient able to afford the medication copay? Yes   Payment Method zero copay   Days supply of Refill 28   Supplies needed? No supplies needed   Refill activity completed? Yes   Refill activity plan Refill scheduled   Shipment/Pickup Date: 06/13/22          Current Outpatient Medications   Medication Sig    ascorbic acid, vitamin C, 1,000 mg TbSR Take 1 tablet by mouth.    atorvastatin (LIPITOR) 20 MG tablet Take 20 mg by mouth.    biotin 10,000 mcg Cap Take 1 capsule by mouth.    cholecalciferol, vitamin D3, (VITAMIN D3) 50 mcg (2,000 unit) Cap Take 1 tablet by mouth.    denosumab (PROLIA) 60 mg/mL Syrg Inject 60 mg into the skin.     etanercept (ENBREL SURECLICK) 50 mg/mL (1 mL) Inject 1 mL (50 mg total) into the skin every 7 days.    ferrous fumarate/vit Bcomp,C (SUPER B COMPLEX ORAL) Take 1 tablet by mouth.    FLAXSEED ORAL 1 (one) time each day    folic acid (FOLVITE) 1 MG tablet Take 1 tablet (1 mg total) by mouth once daily.    glucosamine HCl 1,500 mg Tab Take 1 tablet by mouth.    INOSITOL ORAL Take by mouth.    Lactobac no.41/Bifidobact no.7 (PROBIOTIC-10 ORAL) Take 1 capsule by mouth.    lisinopriL (PRINIVIL,ZESTRIL) 2.5 MG tablet Take 2.5 mg by mouth once daily.    lisinopriL 10 MG tablet Take 10 mg by mouth once daily.    melatonin 10 mg Cap Take 1 capsule by mouth.    methotrexate (TREXALL) 10 MG tablet Take 10 mg by mouth.    methotrexate 2.5 MG Tab Take 3 tablets (7.5 mg total) by mouth every 7 days.    metoprolol tartrate (LOPRESSOR) 25 MG tablet Take 0.5 tablets by mouth.    multivitamin (THERAGRAN) per tablet Take 1 capsule by mouth.    omega-3 fatty acids/fish oil (FISH OIL-OMEGA-3 FATTY ACIDS) 300-1,000 mg capsule Take 1 capsule by mouth.    ondansetron (ZOFRAN-ODT) 8 MG TbDL DISSOLVE 1 TABLET BY MOUTH EVERY EIGHT HOURS  AS NEEDED FOR NAUSEA AND VOMOTING   Last reviewed on 3/31/2022  3:05 PM by Corinne Cundiff, MA    Review of patient's allergies indicates:  No Known Allergies Last reviewed on  4/18/2022 11:34 AM by Jeanine Emmanuel      Tasks added this encounter   No tasks added.   Tasks due within next 3 months   6/8/2022 - Refill Call (Auto Added)     Edvin Taveras [facility]  [address] [place] [id number]  Phone: [phone]  Fax: [phone]

## 2022-06-13 ENCOUNTER — OFFICE VISIT (OUTPATIENT)
Dept: FAMILY MEDICINE | Facility: CLINIC | Age: 62
End: 2022-06-13
Payer: MEDICARE

## 2022-06-13 VITALS
HEART RATE: 73 BPM | BODY MASS INDEX: 27.28 KG/M2 | HEIGHT: 64 IN | SYSTOLIC BLOOD PRESSURE: 126 MMHG | OXYGEN SATURATION: 100 % | WEIGHT: 159.81 LBS | TEMPERATURE: 98 F | DIASTOLIC BLOOD PRESSURE: 82 MMHG

## 2022-06-13 DIAGNOSIS — Z00.00 ANNUAL PHYSICAL EXAM: Primary | ICD-10-CM

## 2022-06-13 DIAGNOSIS — M81.0 OSTEOPOROSIS WITHOUT CURRENT PATHOLOGICAL FRACTURE, UNSPECIFIED OSTEOPOROSIS TYPE: ICD-10-CM

## 2022-06-13 DIAGNOSIS — R41.3 MEMORY CHANGES: ICD-10-CM

## 2022-06-13 DIAGNOSIS — M06.9 RHEUMATOID ARTHRITIS, INVOLVING UNSPECIFIED SITE, UNSPECIFIED WHETHER RHEUMATOID FACTOR PRESENT: ICD-10-CM

## 2022-06-13 DIAGNOSIS — I10 ESSENTIAL HYPERTENSION: ICD-10-CM

## 2022-06-13 DIAGNOSIS — E78.00 PURE HYPERCHOLESTEROLEMIA: ICD-10-CM

## 2022-06-13 DIAGNOSIS — R73.9 HYPERGLYCEMIA: ICD-10-CM

## 2022-06-13 DIAGNOSIS — G47.00 INSOMNIA, UNSPECIFIED TYPE: ICD-10-CM

## 2022-06-13 PROCEDURE — 99999 PR PBB SHADOW E&M-EST. PATIENT-LVL V: ICD-10-PCS | Mod: PBBFAC,,, | Performed by: INTERNAL MEDICINE

## 2022-06-13 PROCEDURE — 1159F MED LIST DOCD IN RCRD: CPT | Mod: CPTII,S$GLB,, | Performed by: INTERNAL MEDICINE

## 2022-06-13 PROCEDURE — 1160F RVW MEDS BY RX/DR IN RCRD: CPT | Mod: CPTII,S$GLB,, | Performed by: INTERNAL MEDICINE

## 2022-06-13 PROCEDURE — 3074F SYST BP LT 130 MM HG: CPT | Mod: CPTII,S$GLB,, | Performed by: INTERNAL MEDICINE

## 2022-06-13 PROCEDURE — 4010F PR ACE/ARB THEARPY RXD/TAKEN: ICD-10-PCS | Mod: CPTII,S$GLB,, | Performed by: INTERNAL MEDICINE

## 2022-06-13 PROCEDURE — 3079F PR MOST RECENT DIASTOLIC BLOOD PRESSURE 80-89 MM HG: ICD-10-PCS | Mod: CPTII,S$GLB,, | Performed by: INTERNAL MEDICINE

## 2022-06-13 PROCEDURE — 1160F PR REVIEW ALL MEDS BY PRESCRIBER/CLIN PHARMACIST DOCUMENTED: ICD-10-PCS | Mod: CPTII,S$GLB,, | Performed by: INTERNAL MEDICINE

## 2022-06-13 PROCEDURE — 3079F DIAST BP 80-89 MM HG: CPT | Mod: CPTII,S$GLB,, | Performed by: INTERNAL MEDICINE

## 2022-06-13 PROCEDURE — 99396 PREV VISIT EST AGE 40-64: CPT | Mod: S$GLB,,, | Performed by: INTERNAL MEDICINE

## 2022-06-13 PROCEDURE — 99396 PR PREVENTIVE VISIT,EST,40-64: ICD-10-PCS | Mod: S$GLB,,, | Performed by: INTERNAL MEDICINE

## 2022-06-13 PROCEDURE — 3074F PR MOST RECENT SYSTOLIC BLOOD PRESSURE < 130 MM HG: ICD-10-PCS | Mod: CPTII,S$GLB,, | Performed by: INTERNAL MEDICINE

## 2022-06-13 PROCEDURE — 1159F PR MEDICATION LIST DOCUMENTED IN MEDICAL RECORD: ICD-10-PCS | Mod: CPTII,S$GLB,, | Performed by: INTERNAL MEDICINE

## 2022-06-13 PROCEDURE — 4010F ACE/ARB THERAPY RXD/TAKEN: CPT | Mod: CPTII,S$GLB,, | Performed by: INTERNAL MEDICINE

## 2022-06-13 PROCEDURE — 3008F PR BODY MASS INDEX (BMI) DOCUMENTED: ICD-10-PCS | Mod: CPTII,S$GLB,, | Performed by: INTERNAL MEDICINE

## 2022-06-13 PROCEDURE — 3008F BODY MASS INDEX DOCD: CPT | Mod: CPTII,S$GLB,, | Performed by: INTERNAL MEDICINE

## 2022-06-13 PROCEDURE — 99999 PR PBB SHADOW E&M-EST. PATIENT-LVL V: CPT | Mod: PBBFAC,,, | Performed by: INTERNAL MEDICINE

## 2022-06-13 RX ORDER — CALCIUM CARBONATE 600 MG
1200 TABLET ORAL DAILY
COMMUNITY

## 2022-06-13 RX ORDER — TRAZODONE HYDROCHLORIDE 50 MG/1
50 TABLET ORAL NIGHTLY
Qty: 30 TABLET | Refills: 2 | Status: SHIPPED | OUTPATIENT
Start: 2022-06-13 | End: 2022-09-13 | Stop reason: SDUPTHER

## 2022-06-13 RX ORDER — MELATONIN 5 MG
1 CAPSULE ORAL NIGHTLY
COMMUNITY

## 2022-06-13 NOTE — Clinical Note
Patient had mammogram at the [facility], Pap smear with Dr. Ajit Soto, and colonoscopy with [facility]

## 2022-06-13 NOTE — PROGRESS NOTES
Subjective:       Patient ID: Earnestine Petit is a 61 y.o. female.    Chief Complaint: Establish Care      HPI  Earnestine Petit is a 61 y.o. female with chronic conditions of hypertension, hyperlipidemia, osteoporosis, RA who presents today to establish care.    Reports she is followed by Rheumatology and advised to have a primary care physician.  She would like to have routine labs and check her blood glucose as there is family history of diabetes.  Her blood pressure has been controlled with same medications.  Notices has been forgetting things and needing to write them.    She is followed by Rheumatology and rheumatoid arthritis has been stable.  Tries to keep methotrexate as low as possible due to some hair loss.  Has occasional joint pains and stiffness.  She is on methotrexate and Enbrel and uses short courses of steroids with flareups.  But none recent.    Has history of osteoporosis and receives treatment with Prolia.    She started exercising and thing she of a did as she was having muscle soreness and mild confusion very tired exercising.  Felt mildly dizzy but has improved.  Was doing body pump. She stays hydrated, keeps caffeine as low as possible.  Has a glass of wine around 5 days a week.  She is watching her diet and mostly eats 2 meals a day.  Nonsmoker.    She is up-to-date with mammograms and Pap smears with outside [facility] gynecologist.    Had colonoscopy.    Health Maintenance:  Health Maintenance   Topic Date Due    Hepatitis C Screening  Never done    TETANUS VACCINE  Never done    Mammogram  Never done    Lipid Panel  08/17/2015       Review of Systems   Constitutional: Positive for activity change (Exercising) and unexpected weight change. Negative for fever.   HENT: Negative.    Eyes: Negative for visual disturbance.   Respiratory: Negative.    Cardiovascular: Negative.    Gastrointestinal: Negative.    Endocrine: Positive for polydipsia. Negative for cold intolerance, heat intolerance and  polyuria.   Genitourinary: Positive for frequency. Negative for bladder incontinence.   Neurological: Negative for weakness, numbness and headaches.   Psychiatric/Behavioral: Positive for dysphoric mood (Feels down occasionally) and sleep disturbance (Trouble falling asleep and staying asleep.). Negative for suicidal ideas.      Past Medical History:   Diagnosis Date    Arthritis     RH    Hyperlipidemia     Hypertension     Osteoporosis        No past surgical history on file.    No family history on file.    Social History     Socioeconomic History    Marital status:    Tobacco Use    Smoking status: Never Smoker    Smokeless tobacco: Never Used       Current Outpatient Medications   Medication Sig Dispense Refill    ascorbic acid, vitamin C, 1,000 mg TbSR Take 1 tablet by mouth.      atorvastatin (LIPITOR) 20 MG tablet Take 20 mg by mouth.      biotin 10,000 mcg Cap Take 1 capsule by mouth.      cholecalciferol, vitamin D3, (VITAMIN D3) 50 mcg (2,000 unit) Cap Take 1 tablet by mouth.      denosumab (PROLIA) 60 mg/mL Syrg Inject 60 mg into the skin.      etanercept (ENBREL SURECLICK) 50 mg/mL (1 mL) Inject 1 mL (50 mg total) into the skin every 7 days. 4 mL 11    ferrous fumarate/vit Bcomp,C (SUPER B COMPLEX ORAL) Take 1 tablet by mouth.      FLAXSEED ORAL 1 (one) time each day      folic acid (FOLVITE) 1 MG tablet Take 1 tablet (1 mg total) by mouth once daily. 90 tablet 3    glucosamine HCl 1,500 mg Tab Take 1 tablet by mouth.      INOSITOL ORAL Take by mouth.      Lactobac no.41/Bifidobact no.7 (PROBIOTIC-10 ORAL) Take 1 capsule by mouth.      lisinopriL (PRINIVIL,ZESTRIL) 2.5 MG tablet Take 2.5 mg by mouth once daily.      melatonin 10 mg Cap Take 1 capsule by mouth.      methotrexate 2.5 MG Tab Take 3 tablets (7.5 mg total) by mouth every 7 days. 12 tablet 3    metoprolol tartrate (LOPRESSOR) 25 MG tablet Take 0.5 tablets by mouth.      multivitamin (THERAGRAN) per tablet Take 1  capsule by mouth.      omega-3 fatty acids/fish oil (FISH OIL-OMEGA-3 FATTY ACIDS) 300-1,000 mg capsule Take 1 capsule by mouth.      ondansetron (ZOFRAN-ODT) 8 MG TbDL DISSOLVE 1 TABLET BY MOUTH EVERY EIGHT HOURS  AS NEEDED FOR NAUSEA AND VOMOTING      lisinopriL 10 MG tablet Take 10 mg by mouth once daily.      methotrexate (TREXALL) 10 MG tablet Take 10 mg by mouth.       No current facility-administered medications for this visit.       Review of patient's allergies indicates:  No Known Allergies      Objective:       Last 3 sets of Vitals    Vitals - 1 value per visit 3/31/2022 6/13/2022 6/13/2022   SYSTOLIC 121 - 126   DIASTOLIC 82 - 82   Pulse 74 - 73   Temp - - 98   Resp - - -   SPO2 - - 100   Weight (lb) 166.89 - 159.83   Weight (kg) 75.7 - 72.5   Height 64 - 64   BMI (Calculated) 28.6 - 27.4   VISIT REPORT - - -   Pain Score  - 3 -   Physical Exam  Constitutional:       General: She is not in acute distress.     Appearance: Normal appearance.   HENT:      Head: Normocephalic.      Right Ear: Tympanic membrane, ear canal and external ear normal.      Left Ear: Tympanic membrane, ear canal and external ear normal.      Nose: Nose normal.      Mouth/Throat:      Mouth: Mucous membranes are moist.   Eyes:      General: No scleral icterus.     Extraocular Movements: Extraocular movements intact.      Conjunctiva/sclera: Conjunctivae normal.      Pupils: Pupils are equal, round, and reactive to light.   Neck:      Vascular: No carotid bruit.      Comments: No goiter.  Cardiovascular:      Rate and Rhythm: Normal rate and regular rhythm.      Pulses: Normal pulses.      Heart sounds: Normal heart sounds.   Pulmonary:      Effort: Pulmonary effort is normal.      Breath sounds: Normal breath sounds.   Abdominal:      General: Bowel sounds are normal. There is no distension.      Palpations: Abdomen is soft. There is no mass.      Tenderness: There is no abdominal tenderness.   Musculoskeletal:         General:  No swelling. Normal range of motion.      Cervical back: Neck supple.   Lymphadenopathy:      Cervical: No cervical adenopathy.   Skin:     General: Skin is warm and dry.   Neurological:      General: No focal deficit present.      Mental Status: She is alert and oriented to person, place, and time.   Psychiatric:         Mood and Affect: Mood normal.         Behavior: Behavior normal.           CBC:  Recent Labs   Lab 12/10/21  1240 03/10/22  1409 06/06/22  0915   WBC 7.42 8.3 8.3   RBC 4.30 4.19 4.29   Hemoglobin 13.0 12.9 13.6   Hematocrit 39.6 38.0 40.3   Platelets 278 240 303   MCV 92 90.7 93.9   MCH 30.2 30.8 31.7   MCHC 32.8 33.9 33.7     CMP:  Recent Labs   Lab 12/10/21  1240 03/10/22  1409 06/06/22  0915   Glucose 107 89 111 H   Calcium 10.3 9.9 9.5   Albumin 4.3 4.5 4.4   Total Protein 8.0 7.4 7.1   Sodium 139 140 137   Potassium 4.4 4.0 4.7   CO2 26 28 27   Chloride 105 103 105   BUN 17 17 18   Creatinine 0.7 0.66 0.63   Alkaline Phosphatase 74  --   --    ALT 29 22 26   AST 25 18 25   Total Bilirubin 0.6 0.3 0.4     URINALYSIS:       LIPIDS:      TSH:        A1C:        Imaging:  X-Ray Chest PA And Lateral  Narrative: EXAMINATION:  XR CHEST PA AND LATERAL    CLINICAL HISTORY:  r/o rheumatoid nodules;  Cough, unspecified    FINDINGS:  Chest two views: Heart size is normal.  Lungs are clear and the bones showed DJD.  Impression: No acute process seen.    Electronically signed by: Ish Lennon MD  Date:    12/10/2021  Time:    13:06  XR Arthritis Survey  Narrative: EXAMINATION:  XR ARTHRITIS SURVEY    CLINICAL HISTORY:  r/o erosions;  Cough, unspecified    FINDINGS:  Arthritis survey: C-spine demonstrates DJD.  Right left knees demonstrate mild DJD.  Right left hands and wrists demonstrate minimal DJD.  Right left feet demonstrate mild DJD and mild hallux valgus deformities.  Impression: No evidence of systemic erosive inflammatory arthritis.    Electronically signed by: Ish Lennon  MD  Date:    12/10/2021  Time:    13:04      Assessment:       1. Annual physical exam    2. Essential hypertension    3. Pure hypercholesterolemia    4. Rheumatoid arthritis, involving unspecified site, unspecified whether rheumatoid factor present    5. Memory changes    6. Insomnia, unspecified type    7. Osteoporosis without current pathological fracture, unspecified osteoporosis type          Chronic conditions status updated as per HPI. Other than changes above, cont current medications and maintain follow up with specialist. Return to clinic in 3 months.  Plan:       Earnestine was seen today for establish care.    Diagnoses and all orders for this visit:    Annual physical exam  -     TSH  -     Lipid Panel  -     Hemoglobin A1C  -     Comprehensive Metabolic Panel  -     CBC Auto Differential  - mammogram, Pap smear, and colonoscopy up-to-date  - stable vital signs and BMI under 30. Exercising and symptoms likely associated to deconditioning.  Hydration recommended.      Essential hypertension  - stable blood pressure on lisinopril and metoprolol  -     TSH  -     CBC Auto Differential    Pure hypercholesterolemia  -     on atorvastatin  -     Lipid Panel    Rheumatoid arthritis, involving unspecified site, unspecified whether rheumatoid factor present   - followed by Rheumatology.  Clinically stable.    Memory changes  -     Ambulatory referral/consult to Neuropsychology; Future  -     she tends to forget minor things for years but lately more often.  -     Vitamin B12; Future  -     RPR  - no significant new changes.  Fatigue from insomnia possibly associated    Insomnia, unspecified type  -     traZODone (DESYREL) 50 MG tablet; Take 1 tablet (50 mg total) by mouth every evening.    Osteoporosis without current pathological fracture, unspecified osteoporosis type   - on Prolia and supplements.  Followed by Nephrology.      Health Maintenance Due   Topic Date Due    Hepatitis C Screening  Never done     Cervical Cancer Screening  Never done    COVID-19 Vaccine (1) Never done    HIV Screening  Never done    TETANUS VACCINE  Never done    Mammogram  Never done    Colorectal Cancer Screening  Never done    Shingles Vaccine (1 of 2) Never done    Lipid Panel  08/17/2015        Vesta Peña MD  [facility]  Disclaimer:  This note has been generated using voice-recognition software. There may be grammatical or spelling errors that have been missed during proof-reading

## 2022-06-15 ENCOUNTER — PATIENT OUTREACH (OUTPATIENT)
Dept: ADMINISTRATIVE | Facility: HOSPITAL | Age: 62
End: 2022-06-15
Payer: MEDICARE

## 2022-06-16 ENCOUNTER — PATIENT MESSAGE (OUTPATIENT)
Dept: FAMILY MEDICINE | Facility: CLINIC | Age: 62
End: 2022-06-16
Payer: MEDICARE

## 2022-06-16 ENCOUNTER — PATIENT OUTREACH (OUTPATIENT)
Dept: ADMINISTRATIVE | Facility: HOSPITAL | Age: 62
End: 2022-06-16
Payer: MEDICARE

## 2022-07-01 ENCOUNTER — PATIENT MESSAGE (OUTPATIENT)
Dept: RHEUMATOLOGY | Facility: CLINIC | Age: 62
End: 2022-07-01
Payer: MEDICARE

## 2022-07-05 ENCOUNTER — HOSPITAL ENCOUNTER (OUTPATIENT)
Dept: RADIOLOGY | Facility: HOSPITAL | Age: 62
Discharge: HOME | End: 2022-07-05
Attending: INTERNAL MEDICINE
Payer: MEDICARE

## 2022-07-05 ENCOUNTER — OFFICE VISIT (OUTPATIENT)
Dept: NEUROLOGY | Facility: CLINIC | Age: 62
End: 2022-07-05
Payer: MEDICARE

## 2022-07-05 DIAGNOSIS — F51.01 PRIMARY INSOMNIA: ICD-10-CM

## 2022-07-05 DIAGNOSIS — R41.89 SIGNS AND SYMPTOMS INVOLVING COGNITION: ICD-10-CM

## 2022-07-05 DIAGNOSIS — F41.9 ANXIETY: Primary | ICD-10-CM

## 2022-07-05 DIAGNOSIS — M06.9 RHEUMATOID ARTHRITIS INVOLVING MULTIPLE JOINTS: ICD-10-CM

## 2022-07-05 DIAGNOSIS — M25.50 POLYARTHRALGIA: ICD-10-CM

## 2022-07-05 PROCEDURE — 77077 JOINT SURVEY SINGLE VIEW: CPT | Mod: 26,,, | Performed by: RADIOLOGY

## 2022-07-05 PROCEDURE — 90791 PSYCH DIAGNOSTIC EVALUATION: CPT | Mod: FQ,95,, | Performed by: PSYCHIATRY & NEUROLOGY

## 2022-07-05 PROCEDURE — 99499 UNLISTED E&M SERVICE: CPT | Mod: 95,,, | Performed by: PSYCHIATRY & NEUROLOGY

## 2022-07-05 PROCEDURE — 77077 JOINT SURVEY SINGLE VIEW: CPT | Mod: TC

## 2022-07-05 PROCEDURE — 77077 XR ARTHRITIS SURVEY: ICD-10-PCS | Mod: 26,,, | Performed by: RADIOLOGY

## 2022-07-05 PROCEDURE — 90791 PR PSYCHIATRIC DIAGNOSTIC EVALUATION: ICD-10-PCS | Mod: FQ,95,, | Performed by: PSYCHIATRY & NEUROLOGY

## 2022-07-05 PROCEDURE — 99499 NO LOS: ICD-10-PCS | Mod: 95,,, | Performed by: PSYCHIATRY & NEUROLOGY

## 2022-07-05 PROCEDURE — 4010F PR ACE/ARB THEARPY RXD/TAKEN: ICD-10-PCS | Mod: CPTII,95,, | Performed by: PSYCHIATRY & NEUROLOGY

## 2022-07-05 PROCEDURE — 4010F ACE/ARB THERAPY RXD/TAKEN: CPT | Mod: CPTII,95,, | Performed by: PSYCHIATRY & NEUROLOGY
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
## 2022-07-06 ENCOUNTER — SPECIALTY PHARMACY (OUTPATIENT)
Dept: PHARMACY | Facility: CLINIC | Age: 62
End: 2022-07-06
Payer: MEDICARE

## 2022-07-06 DIAGNOSIS — M06.9 RHEUMATOID ARTHRITIS, INVOLVING UNSPECIFIED SITE, UNSPECIFIED WHETHER RHEUMATOID FACTOR PRESENT: Primary | ICD-10-CM

## 2022-07-06 NOTE — TELEPHONE ENCOUNTER
Specialty Pharmacy - Refill Coordination    Specialty Medication Orders Linked to Encounter    Flowsheet Row Most Recent Value   Medication #1 etanercept (ENBREL SURECLICK) 50 mg/mL (1 mL) (Order#[id number], Rx#[id number])          Refill Questions - Documented Responses    Flowsheet Row Most Recent Value   Patient Availability and HIPAA Verification    Does patient want to proceed with activity? Yes   HIPAA/medical authority confirmed? Yes   Relationship to patient of person spoken to? Self   Refill Screening Questions    Changes to allergies? No   Changes to medications? No   New conditions since last clinic visit? No   Unplanned office visit, urgent care, ED, or hospital admission in the last 4 weeks? No   How does patient/caregiver feel medication is working? Good   Financial problems or insurance changes? No   How many doses of your specialty medications were missed in the last 4 weeks? 0   Would patient like to speak to a pharmacist? No   When does the patient need to receive the medication? 07/13/22   Refill Delivery Questions    How will the patient receive the medication? Delivery Grace   When does the patient need to receive the medication? 07/13/22   Shipping Address Home   Address in [facility] confirmed and updated if neccessary? Yes   Expected Copay ($) 0   Is the patient able to afford the medication copay? Yes   Payment Method zero copay   Days supply of Refill 28   Supplies needed? No supplies needed   Refill activity completed? Yes   Refill activity plan Refill scheduled   Shipment/Pickup Date: 07/08/22          Current Outpatient Medications   Medication Sig    ascorbic acid, vitamin C, 1,000 mg TbSR Take 1 tablet by mouth.    atorvastatin (LIPITOR) 20 MG tablet Take 20 mg by mouth.    biotin 10,000 mcg Cap Take 1 capsule by mouth.    calcium carbonate (OS-ALIYAH) 600 mg calcium (1,500 mg) Tab Take 1,200 mg by mouth once daily at 6am.    cholecalciferol, vitamin D3, (VITAMIN D3) 50 mcg  (2,000 unit) Cap Take 1 tablet by mouth.    denosumab (PROLIA) 60 mg/mL Syrg Inject 60 mg into the skin.    etanercept (ENBREL SURECLICK) 50 mg/mL (1 mL) Inject 1 mL (50 mg total) into the skin every 7 days.    ferrous fumarate/vit Bcomp,C (SUPER B COMPLEX ORAL) Take 1 tablet by mouth.    FLAXSEED ORAL 1 (one) time each day    folic acid (FOLVITE) 1 MG tablet Take 1 tablet (1 mg total) by mouth once daily.    glucosamine HCl 1,500 mg Tab Take 1 tablet by mouth.    INOSITOL ORAL Take by mouth.    Lactobac no.41/Bifidobact no.7 (PROBIOTIC-10 ORAL) Take 1 capsule by mouth.    lisinopriL (PRINIVIL,ZESTRIL) 2.5 MG tablet Take 5 mg by mouth once daily.    melatonin 5 mg Cap Take 1 capsule by mouth every evening.    methotrexate 2.5 MG Tab Take 3 tablets (7.5 mg total) by mouth every 7 days.    metoprolol tartrate (LOPRESSOR) 25 MG tablet Take 1 tablet by mouth nightly.    multivitamin (THERAGRAN) per tablet Take 1 capsule by mouth.    omega-3 fatty acids/fish oil (FISH OIL-OMEGA-3 FATTY ACIDS) 300-1,000 mg capsule Take 1 capsule by mouth.    ondansetron (ZOFRAN-ODT) 8 MG TbDL DISSOLVE 1 TABLET BY MOUTH EVERY EIGHT HOURS  AS NEEDED FOR NAUSEA AND VOMOTING    traZODone (DESYREL) 50 MG tablet Take 1 tablet (50 mg total) by mouth every evening.   Last reviewed on 6/14/2022 10:37 PM by Vesta Peña MD    Review of patient's allergies indicates:  No Known Allergies Last reviewed on  6/14/2022 10:37 PM by Vesta Peña      Tasks added this encounter   8/3/2022 - Refill Call (Auto Added)   Tasks due within next 3 months   9/20/2022 - Clinical - Follow Up Assesement (Annual)     Lelia Borges, PharmD  [facility]  [address] [place] [id number]  Phone: [phone]  Fax: [phone]

## 2022-07-07 LAB
ALBUMIN SERPL-MCNC: 4.8 G/DL (ref 3.6–5.1)
ALBUMIN/GLOB SERPL: 2 (CALC) (ref 1–2.5)
ALP SERPL-CCNC: 56 U/L (ref 37–153)
ALT SERPL-CCNC: 21 U/L (ref 6–29)
AST SERPL-CCNC: 21 U/L (ref 10–35)
BASOPHILS # BLD AUTO: 52 CELLS/UL (ref 0–200)
BASOPHILS NFR BLD AUTO: 0.9 %
BILIRUB SERPL-MCNC: 0.6 MG/DL (ref 0.2–1.2)
BUN SERPL-MCNC: 15 MG/DL (ref 7–25)
BUN/CREAT SERPL: NORMAL (CALC) (ref 6–22)
CALCIUM SERPL-MCNC: 9.5 MG/DL (ref 8.6–10.4)
CHLORIDE SERPL-SCNC: 104 MMOL/L (ref 98–110)
CHOLEST SERPL-MCNC: 152 MG/DL
CHOLEST/HDLC SERPL: 2.8 (CALC)
CO2 SERPL-SCNC: 29 MMOL/L (ref 20–32)
CREAT SERPL-MCNC: 0.67 MG/DL (ref 0.5–0.99)
EOSINOPHIL # BLD AUTO: 232 CELLS/UL (ref 15–500)
EOSINOPHIL NFR BLD AUTO: 4 %
ERYTHROCYTE [DISTWIDTH] IN BLOOD BY AUTOMATED COUNT: 13.3 % (ref 11–15)
GLOBULIN SER CALC-MCNC: 2.4 G/DL (CALC) (ref 1.9–3.7)
GLUCOSE SERPL-MCNC: 94 MG/DL (ref 65–99)
HBA1C MFR BLD: 5.4 % OF TOTAL HGB
HCT VFR BLD AUTO: 38.5 % (ref 35–45)
HDLC SERPL-MCNC: 55 MG/DL
HGB BLD-MCNC: 12.5 G/DL (ref 11.7–15.5)
LDLC SERPL CALC-MCNC: 77 MG/DL (CALC)
LYMPHOCYTES # BLD AUTO: 2013 CELLS/UL (ref 850–3900)
LYMPHOCYTES NFR BLD AUTO: 34.7 %
MCH RBC QN AUTO: 29.6 PG (ref 27–33)
MCHC RBC AUTO-ENTMCNC: 32.5 G/DL (ref 32–36)
MCV RBC AUTO: 91.2 FL (ref 80–100)
MONOCYTES # BLD AUTO: 499 CELLS/UL (ref 200–950)
MONOCYTES NFR BLD AUTO: 8.6 %
NEUTROPHILS # BLD AUTO: 3004 CELLS/UL (ref 1500–7800)
NEUTROPHILS NFR BLD AUTO: 51.8 %
NONHDLC SERPL-MCNC: 97 MG/DL (CALC)
PLATELET # BLD AUTO: 260 THOUSAND/UL (ref 140–400)
PMV BLD REES-ECKER: 10.9 FL (ref 7.5–12.5)
POTASSIUM SERPL-SCNC: 4.1 MMOL/L (ref 3.5–5.3)
PROT SERPL-MCNC: 7.2 G/DL (ref 6.1–8.1)
RBC # BLD AUTO: 4.22 MILLION/UL (ref 3.8–5.1)
RPR SER QL: NORMAL
SODIUM SERPL-SCNC: 138 MMOL/L (ref 135–146)
TRIGL SERPL-MCNC: 122 MG/DL
TSH SERPL-ACNC: 3.01 MIU/L (ref 0.4–4.5)
VIT B12 SERPL-MCNC: 842 PG/ML (ref 200–1100)
WBC # BLD AUTO: 5.8 THOUSAND/UL (ref 3.8–10.8)

## 2022-07-10 ENCOUNTER — PATIENT MESSAGE (OUTPATIENT)
Dept: FAMILY MEDICINE | Facility: CLINIC | Age: 62
End: 2022-07-10
Payer: MEDICARE

## 2022-07-22 ENCOUNTER — OFFICE VISIT (OUTPATIENT)
Dept: NEUROLOGY | Facility: CLINIC | Age: 62
End: 2022-07-22
Payer: MEDICARE

## 2022-07-22 DIAGNOSIS — F41.9 ANXIETY: ICD-10-CM

## 2022-07-22 DIAGNOSIS — R29.818 SUSPECTED SLEEP APNEA: ICD-10-CM

## 2022-07-22 DIAGNOSIS — F51.01 PRIMARY INSOMNIA: Primary | ICD-10-CM

## 2022-07-22 DIAGNOSIS — R41.3 MEMORY CHANGES: ICD-10-CM

## 2022-07-22 PROCEDURE — 4010F ACE/ARB THERAPY RXD/TAKEN: CPT | Mod: CPTII,S$GLB,, | Performed by: PSYCHIATRY & NEUROLOGY

## 2022-07-22 PROCEDURE — 96132 PR NEUROPSYCHOLOGIC TEST EVAL SVCS, 1ST HR: ICD-10-PCS | Mod: S$GLB,,, | Performed by: PSYCHIATRY & NEUROLOGY

## 2022-07-22 PROCEDURE — 96139 PSYCL/NRPSYC TST TECH EA: CPT | Mod: S$GLB,,, | Performed by: PSYCHIATRY & NEUROLOGY

## 2022-07-22 PROCEDURE — 4010F PR ACE/ARB THEARPY RXD/TAKEN: ICD-10-PCS | Mod: CPTII,S$GLB,, | Performed by: PSYCHIATRY & NEUROLOGY

## 2022-07-22 PROCEDURE — 99999 PR PBB SHADOW E&M-EST. PATIENT-LVL I: ICD-10-PCS | Mod: PBBFAC,,, | Performed by: PSYCHIATRY & NEUROLOGY

## 2022-07-22 PROCEDURE — 96138 PR PSYCH/NEUROPSYCH TEST ADMIN/SCORING, BY TECH, 2+ TESTS, 1ST 30 MIN: ICD-10-PCS | Mod: S$GLB,,, | Performed by: PSYCHIATRY & NEUROLOGY

## 2022-07-22 PROCEDURE — 96132 NRPSYC TST EVAL PHYS/QHP 1ST: CPT | Mod: S$GLB,,, | Performed by: PSYCHIATRY & NEUROLOGY

## 2022-07-22 PROCEDURE — 96138 PSYCL/NRPSYC TECH 1ST: CPT | Mod: S$GLB,,, | Performed by: PSYCHIATRY & NEUROLOGY

## 2022-07-22 PROCEDURE — 96133 PR NEUROPSYCHOLOGIC TEST EVAL SVCS, EA ADDTL HR: ICD-10-PCS | Mod: S$GLB,,, | Performed by: PSYCHIATRY & NEUROLOGY

## 2022-07-22 PROCEDURE — 96139 PR PSYCH/NEUROPSYCH TEST ADMIN/SCORING, BY TECH, 2+ TESTS, EA ADDTL 30 MIN: ICD-10-PCS | Mod: S$GLB,,, | Performed by: PSYCHIATRY & NEUROLOGY

## 2022-07-22 PROCEDURE — 99499 NO LOS: ICD-10-PCS | Mod: S$GLB,,, | Performed by: PSYCHIATRY & NEUROLOGY

## 2022-07-22 PROCEDURE — 3044F HG A1C LEVEL LT 7.0%: CPT | Mod: CPTII,S$GLB,, | Performed by: PSYCHIATRY & NEUROLOGY

## 2022-07-22 PROCEDURE — 3044F PR MOST RECENT HEMOGLOBIN A1C LEVEL <7.0%: ICD-10-PCS | Mod: CPTII,S$GLB,, | Performed by: PSYCHIATRY & NEUROLOGY

## 2022-07-22 PROCEDURE — 99999 PR PBB SHADOW E&M-EST. PATIENT-LVL I: CPT | Mod: PBBFAC,,, | Performed by: PSYCHIATRY & NEUROLOGY

## 2022-07-22 PROCEDURE — 99499 UNLISTED E&M SERVICE: CPT | Mod: S$GLB,,, | Performed by: PSYCHIATRY & NEUROLOGY

## 2022-07-22 PROCEDURE — 96133 NRPSYC TST EVAL PHYS/QHP EA: CPT | Mod: S$GLB,,, | Performed by: PSYCHIATRY & NEUROLOGY

## 2022-07-25 ENCOUNTER — OFFICE VISIT (OUTPATIENT)
Dept: RHEUMATOLOGY | Facility: CLINIC | Age: 62
End: 2022-07-25
Payer: MEDICARE

## 2022-07-25 VITALS
SYSTOLIC BLOOD PRESSURE: 137 MMHG | BODY MASS INDEX: 27.4 KG/M2 | WEIGHT: 160.5 LBS | DIASTOLIC BLOOD PRESSURE: 82 MMHG | HEIGHT: 64 IN | HEART RATE: 50 BPM

## 2022-07-25 DIAGNOSIS — M79.642 LEFT HAND PAIN: ICD-10-CM

## 2022-07-25 DIAGNOSIS — M79.89 SWELLING OF RIGHT FOOT: ICD-10-CM

## 2022-07-25 DIAGNOSIS — M79.642 PAIN OF LEFT HAND: ICD-10-CM

## 2022-07-25 DIAGNOSIS — M79.89 SWELLING OF LEFT HAND: ICD-10-CM

## 2022-07-25 DIAGNOSIS — M06.9 RHEUMATOID ARTHRITIS INVOLVING MULTIPLE JOINTS: Primary | ICD-10-CM

## 2022-07-25 DIAGNOSIS — M79.671 RIGHT FOOT PAIN: ICD-10-CM

## 2022-07-25 PROCEDURE — 1159F MED LIST DOCD IN RCRD: CPT | Mod: CPTII,S$GLB,, | Performed by: INTERNAL MEDICINE

## 2022-07-25 PROCEDURE — 99999 PR PBB SHADOW E&M-EST. PATIENT-LVL III: CPT | Mod: PBBFAC,,, | Performed by: INTERNAL MEDICINE

## 2022-07-25 PROCEDURE — 3044F PR MOST RECENT HEMOGLOBIN A1C LEVEL <7.0%: ICD-10-PCS | Mod: CPTII,S$GLB,, | Performed by: INTERNAL MEDICINE

## 2022-07-25 PROCEDURE — 4010F PR ACE/ARB THEARPY RXD/TAKEN: ICD-10-PCS | Mod: CPTII,S$GLB,, | Performed by: INTERNAL MEDICINE

## 2022-07-25 PROCEDURE — 1159F PR MEDICATION LIST DOCUMENTED IN MEDICAL RECORD: ICD-10-PCS | Mod: CPTII,S$GLB,, | Performed by: INTERNAL MEDICINE

## 2022-07-25 PROCEDURE — 3044F HG A1C LEVEL LT 7.0%: CPT | Mod: CPTII,S$GLB,, | Performed by: INTERNAL MEDICINE

## 2022-07-25 PROCEDURE — 99214 OFFICE O/P EST MOD 30 MIN: CPT | Mod: S$GLB,,, | Performed by: INTERNAL MEDICINE

## 2022-07-25 PROCEDURE — 99499 RISK ADDL DX/OHS AUDIT: ICD-10-PCS | Mod: HCNC,S$GLB,, | Performed by: INTERNAL MEDICINE

## 2022-07-25 PROCEDURE — 4010F ACE/ARB THERAPY RXD/TAKEN: CPT | Mod: CPTII,S$GLB,, | Performed by: INTERNAL MEDICINE

## 2022-07-25 PROCEDURE — 3079F DIAST BP 80-89 MM HG: CPT | Mod: CPTII,S$GLB,, | Performed by: INTERNAL MEDICINE

## 2022-07-25 PROCEDURE — 3075F SYST BP GE 130 - 139MM HG: CPT | Mod: CPTII,S$GLB,, | Performed by: INTERNAL MEDICINE

## 2022-07-25 PROCEDURE — 3008F BODY MASS INDEX DOCD: CPT | Mod: CPTII,S$GLB,, | Performed by: INTERNAL MEDICINE

## 2022-07-25 PROCEDURE — 3008F PR BODY MASS INDEX (BMI) DOCUMENTED: ICD-10-PCS | Mod: CPTII,S$GLB,, | Performed by: INTERNAL MEDICINE

## 2022-07-25 PROCEDURE — 99999 PR PBB SHADOW E&M-EST. PATIENT-LVL III: ICD-10-PCS | Mod: PBBFAC,,, | Performed by: INTERNAL MEDICINE

## 2022-07-25 PROCEDURE — 3079F PR MOST RECENT DIASTOLIC BLOOD PRESSURE 80-89 MM HG: ICD-10-PCS | Mod: CPTII,S$GLB,, | Performed by: INTERNAL MEDICINE

## 2022-07-25 PROCEDURE — 3075F PR MOST RECENT SYSTOLIC BLOOD PRESS GE 130-139MM HG: ICD-10-PCS | Mod: CPTII,S$GLB,, | Performed by: INTERNAL MEDICINE

## 2022-07-25 PROCEDURE — 99214 PR OFFICE/OUTPT VISIT, EST, LEVL IV, 30-39 MIN: ICD-10-PCS | Mod: S$GLB,,, | Performed by: INTERNAL MEDICINE

## 2022-07-25 PROCEDURE — 99499 UNLISTED E&M SERVICE: CPT | Mod: HCNC,S$GLB,, | Performed by: INTERNAL MEDICINE

## 2022-07-25 RX ORDER — IXEKIZUMAB 80 MG/ML
80 INJECTION, SOLUTION SUBCUTANEOUS
Qty: 2 ML | Refills: 11 | Status: SHIPPED | OUTPATIENT
Start: 2022-07-25 | End: 2022-07-28 | Stop reason: ALTCHOICE

## 2022-07-25 NOTE — PROGRESS NOTES
Answers for HPI/ROS submitted by the patient on 7/25/2022  fever: No  eye redness: No  mouth sores: No  headaches: No  shortness of breath: No  chest pain: No  trouble swallowing: No  diarrhea: No  constipation: No  unexpected weight change: No  genital sore: No  dysuria: No  During the last 3 days, have you had a skin rash?: No  Bruises or bleeds easily: No  cough: No

## 2022-07-25 NOTE — PROGRESS NOTES
Subjective:       Patient ID: Earnestine Petit is a 61 y.o. female.    Chief Complaint: Disease Management    HPI 61 year old F with PMH of RA (around age 57), osteoporosis, palpitations here for evaluation.  When she was fist diagnosed with RA, she had involvement of shoulders, elbows, hands,wrists, knees, ankle, and feet.  She was started on MTX and prednisone at time of diagnosis.  She is taking MTX 3 pills once a week for a year and also on enbrel for over 3 years. She is taking folic acid 1 mg a day.  She has mild pain in right knee, right wrist, and some pain in hands with making a fist. She also has pain in feet. She reports she gets swelling in left second finger and other fingers on left hand. On occasion, her right knee will get swollen.  Resting improves the pain. Denies any rashes, oral ulcers, hair loss, fevers,or photosensitivity. She smoked when she was 14 just socially.      She is on prolia for a year and half for osteoporosis. She was on avista for 3 years.  She has had osteoporosis since age 52.    Family hx: sister: RA  Aunt: RA    Interval history: She is taking MTX 3 pills once a week. She is taking folic acid  1 mg a day.  She is on enbrel. She is having pain and swelling in both hands, worse on the left.  She is also having swelling in the feet.      Past Medical History:   Diagnosis Date    Arthritis     RH    Hyperlipidemia     Hypertension     Osteoporosis        Review of Systems   Constitutional: Negative for activity change, appetite change, chills, diaphoresis, fatigue, fever and unexpected weight change.   HENT: Negative for congestion, ear discharge, ear pain, facial swelling, mouth sores, sinus pressure, sneezing, sore throat, tinnitus and trouble swallowing.    Eyes: Negative for photophobia, pain, discharge, redness, itching and visual disturbance.   Respiratory: Negative for apnea, cough, chest tightness, shortness of breath, wheezing and stridor.    Cardiovascular: Negative  for chest pain and leg swelling.   Gastrointestinal: Negative for abdominal distention, abdominal pain, anal bleeding, blood in stool, constipation, diarrhea and nausea.   Endocrine: Negative for cold intolerance and heat intolerance.   Genitourinary: Negative for difficulty urinating, dysuria and genital sores.   Musculoskeletal: Positive for arthralgias. Negative for back pain, gait problem, joint swelling, myalgias, neck pain and neck stiffness.   Skin: Negative for color change, pallor, rash and wound.   Neurological: Negative for dizziness, seizures, light-headedness, numbness and headaches.   Hematological: Negative for adenopathy. Does not bruise/bleed easily.   Psychiatric/Behavioral: Negative for sleep disturbance. The patient is not nervous/anxious.            Objective:        Physical Exam   Constitutional: She is oriented to person, place, and time.   HENT:   Head: Normocephalic and atraumatic.   Right Ear: External ear normal.   Left Ear: External ear normal.   Nose: Nose normal.   Mouth/Throat: Oropharynx is clear and moist. No oropharyngeal exudate.   Eyes: Conjunctivae and EOM are normal. Pupils are equal, round, and reactive to light. Right eye exhibits no discharge. Left eye exhibits no discharge. No scleral icterus.   Neck: No JVD present. No thyromegaly present.   Cardiovascular: Normal rate, regular rhythm, normal heart sounds and intact distal pulses.  Exam reveals no gallop and no friction rub.    No murmur heard.  Pulmonary/Chest: Effort normal and breath sounds normal. No respiratory distress. She has no wheezes. She has no rales. She exhibits no tenderness.   Abdominal: Soft. Bowel sounds are normal. She exhibits no distension and no mass. There is no abdominal tenderness. There is no rebound and no guarding.   Lymphadenopathy:     She has no cervical adenopathy.   Neurological: She is alert and oriented to person, place, and time. No cranial nerve deficit. Gait normal. Coordination  normal.   Skin: Skin is dry. No rash noted. No erythema. No pallor.     Psychiatric: Affect and judgment normal.   Musculoskeletal: Deformity present. No tenderness or edema.           Assessment:     61 year old F with PMH of RA (around age 57), osteoporosis, palpitations here for evaluation.   She is on MTX 3 pills once a week and enbrel and is doing well.    Plan:       Problem List Items Addressed This Visit    None       Labs every 3 months  Continue MTX 3 pills once week (Risks and benefits of initiating MTX discussed. Patient aware that risks include and not limited to lung toxicity, liver abnormalities, cell count abnormalities, malignancy, and allergic  reaction to medication. Patient agrees with starting medication.  Continue folic acid 1 mg po qday  30 * minutes of total time spent on the encounter, which includes face to face time and non-face to face time preparing to see the patient (eg, review of tests), Obtaining and/or reviewing separately obtained history, Documenting clinical information in the electronic or other health record, Independently interpreting results (not separately reported) and communicating results to the patient/family/caregiver, or Care coordination (not separately reported).         *  30 * minutes of total time spent on the encounter, which includes face to face time and non-face to face time preparing to see the patient (eg, review of tests), Obtaining and/or reviewing separately obtained history, Documenting clinical information in the electronic or other health record, Independently interpreting results (not separately reported) and communicating results to the patient/family/caregiver, or Care coordination (not separately reported).

## 2022-07-26 ENCOUNTER — PATIENT MESSAGE (OUTPATIENT)
Dept: RHEUMATOLOGY | Facility: CLINIC | Age: 62
End: 2022-07-26
Payer: MEDICARE

## 2022-07-26 LAB
ALBUMIN SERPL-MCNC: 4.7 G/DL (ref 3.6–5.1)
ALBUMIN/GLOB SERPL: 1.7 (CALC) (ref 1–2.5)
ALP SERPL-CCNC: 57 U/L (ref 37–153)
ALT SERPL-CCNC: 24 U/L (ref 6–29)
AST SERPL-CCNC: 24 U/L (ref 10–35)
BASOPHILS # BLD AUTO: 53 CELLS/UL (ref 0–200)
BASOPHILS NFR BLD AUTO: 0.8 %
BILIRUB SERPL-MCNC: 0.3 MG/DL (ref 0.2–1.2)
BUN SERPL-MCNC: 15 MG/DL (ref 7–25)
BUN/CREAT SERPL: NORMAL (CALC) (ref 6–22)
CALCIUM SERPL-MCNC: 10.4 MG/DL (ref 8.6–10.4)
CHLORIDE SERPL-SCNC: 104 MMOL/L (ref 98–110)
CO2 SERPL-SCNC: 24 MMOL/L (ref 20–32)
CREAT SERPL-MCNC: 0.65 MG/DL (ref 0.5–1.05)
CRP SERPL-MCNC: 1.1 MG/L
EGFR: 99 ML/MIN/1.73M2
EOSINOPHIL # BLD AUTO: 205 CELLS/UL (ref 15–500)
EOSINOPHIL NFR BLD AUTO: 3.1 %
ERYTHROCYTE [DISTWIDTH] IN BLOOD BY AUTOMATED COUNT: 13.2 % (ref 11–15)
ERYTHROCYTE [SEDIMENTATION RATE] IN BLOOD BY WESTERGREN METHOD: 6 MM/H
GLOBULIN SER CALC-MCNC: 2.8 G/DL (CALC) (ref 1.9–3.7)
GLUCOSE SERPL-MCNC: 93 MG/DL (ref 65–99)
HCT VFR BLD AUTO: 38.2 % (ref 35–45)
HGB BLD-MCNC: 12.5 G/DL (ref 11.7–15.5)
LYMPHOCYTES # BLD AUTO: 2290 CELLS/UL (ref 850–3900)
LYMPHOCYTES NFR BLD AUTO: 34.7 %
MCH RBC QN AUTO: 30.3 PG (ref 27–33)
MCHC RBC AUTO-ENTMCNC: 32.7 G/DL (ref 32–36)
MCV RBC AUTO: 92.5 FL (ref 80–100)
MONOCYTES # BLD AUTO: 713 CELLS/UL (ref 200–950)
MONOCYTES NFR BLD AUTO: 10.8 %
NEUTROPHILS # BLD AUTO: 3340 CELLS/UL (ref 1500–7800)
NEUTROPHILS NFR BLD AUTO: 50.6 %
PLATELET # BLD AUTO: 243 THOUSAND/UL (ref 140–400)
PMV BLD REES-ECKER: 11.4 FL (ref 7.5–12.5)
POTASSIUM SERPL-SCNC: 4.3 MMOL/L (ref 3.5–5.3)
PROT SERPL-MCNC: 7.5 G/DL (ref 6.1–8.1)
RBC # BLD AUTO: 4.13 MILLION/UL (ref 3.8–5.1)
SODIUM SERPL-SCNC: 138 MMOL/L (ref 135–146)
WBC # BLD AUTO: 6.6 THOUSAND/UL (ref 3.8–10.8)

## 2022-07-27 ENCOUNTER — SPECIALTY PHARMACY (OUTPATIENT)
Dept: PHARMACY | Facility: CLINIC | Age: 62
End: 2022-07-27
Payer: MEDICARE

## 2022-07-27 ENCOUNTER — PATIENT MESSAGE (OUTPATIENT)
Dept: RHEUMATOLOGY | Facility: CLINIC | Age: 62
End: 2022-07-27
Payer: MEDICARE

## 2022-07-27 NOTE — TELEPHONE ENCOUNTER
Specialty Pharmacy - Refill Coordination    Specialty Medication Orders Linked to Encounter    Flowsheet Row Most Recent Value   Medication #1 etanercept (ENBREL SURECLICK) 50 mg/mL (1 mL) (Order#[id number], Rx#[id number])          Refill Questions - Documented Responses    Flowsheet Row Most Recent Value   Patient Availability and HIPAA Verification    Does patient want to proceed with activity? Yes   HIPAA/medical authority confirmed? Yes   Relationship to patient of person spoken to? Self   Refill Screening Questions    Changes to allergies? No   Changes to medications? No   New conditions since last clinic visit? No   Unplanned office visit, urgent care, ED, or hospital admission in the last 4 weeks? No   How does patient/caregiver feel medication is working? Good   Financial problems or insurance changes? No   How many doses of your specialty medications were missed in the last 4 weeks? 0   Would patient like to speak to a pharmacist? No   When does the patient need to receive the medication? 07/28/22   Refill Delivery Questions    How will the patient receive the medication? Delivery Grace   When does the patient need to receive the medication? 07/28/22   Shipping Address Home   Address in [facility] confirmed and updated if neccessary? Yes   Expected Copay ($) 0   Is the patient able to afford the medication copay? Yes   Payment Method zero copay   Days supply of Refill 28   Supplies needed? Alcohol Swabs   Refill activity completed? Yes   Refill activity plan Refill scheduled   Shipment/Pickup Date: 07/27/22          Current Outpatient Medications   Medication Sig    ascorbic acid, vitamin C, 1,000 mg TbSR Take 1 tablet by mouth.    atorvastatin (LIPITOR) 20 MG tablet Take 20 mg by mouth.    biotin 10,000 mcg Cap Take 1 capsule by mouth.    calcium carbonate (OS-ALIYAH) 600 mg calcium (1,500 mg) Tab Take 1,200 mg by mouth once daily at 6am.    cholecalciferol, vitamin D3, (VITAMIN D3) 50 mcg (2,000  unit) Cap Take 1 tablet by mouth.    denosumab (PROLIA) 60 mg/mL Syrg Inject 60 mg into the skin.    etanercept (ENBREL SURECLICK) 50 mg/mL (1 mL) Inject 1 mL (50 mg total) into the skin every 7 days.    ferrous fumarate/vit Bcomp,C (SUPER B COMPLEX ORAL) Take 1 tablet by mouth.    FLAXSEED ORAL 1 (one) time each day    folic acid (FOLVITE) 1 MG tablet Take 1 tablet (1 mg total) by mouth once daily.    glucosamine HCl 1,500 mg Tab Take 1 tablet by mouth.    INOSITOL ORAL Take by mouth.    ixekizumab (TALTZ AUTOINJECTOR, 2 PACK,) 80 mg/mL AtIn Inject 80 mg into the skin every 28 days.    Lactobac no.41/Bifidobact no.7 (PROBIOTIC-10 ORAL) Take 1 capsule by mouth.    lisinopriL (PRINIVIL,ZESTRIL) 2.5 MG tablet Take 5 mg by mouth once daily.    melatonin 5 mg Cap Take 1 capsule by mouth every evening.    methotrexate 2.5 MG Tab Take 3 tablets (7.5 mg total) by mouth every 7 days.    metoprolol tartrate (LOPRESSOR) 25 MG tablet Take 1 tablet by mouth nightly.    multivitamin (THERAGRAN) per tablet Take 1 capsule by mouth.    omega-3 fatty acids/fish oil (FISH OIL-OMEGA-3 FATTY ACIDS) 300-1,000 mg capsule Take 1 capsule by mouth.    ondansetron (ZOFRAN-ODT) 8 MG TbDL DISSOLVE 1 TABLET BY MOUTH EVERY EIGHT HOURS  AS NEEDED FOR NAUSEA AND VOMOTING    traZODone (DESYREL) 50 MG tablet Take 1 tablet (50 mg total) by mouth every evening.   Last reviewed on 7/25/2022  3:22 PM by Meenu Piedra MA    Review of patient's allergies indicates:  No Known Allergies Last reviewed on  7/25/2022 3:22 PM by Gama Piedra      Tasks added this encounter   No tasks added.   Tasks due within next 3 months   9/20/2022 - Clinical - Follow Up Assesement (Annual)  8/3/2022 - Refill Call (Auto Added)   Patient called and was unaware that new rx was sent in. She is happy with her current Enbrel dosage. Patient has 1 pen left for next week's dose.   Ever Dewitt, PharmD  [facility]  [address]  [place]  [facility] [address]  Phone: [phone]  Fax: [phone]
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
## 2022-08-10 ENCOUNTER — OFFICE VISIT (OUTPATIENT)
Dept: NEUROLOGY | Facility: CLINIC | Age: 62
End: 2022-08-10
Payer: MEDICARE

## 2022-08-10 DIAGNOSIS — F41.9 ANXIETY: ICD-10-CM

## 2022-08-10 DIAGNOSIS — F51.01 PRIMARY INSOMNIA: Primary | ICD-10-CM

## 2022-08-10 PROCEDURE — 3044F HG A1C LEVEL LT 7.0%: CPT | Mod: CPTII,95,, | Performed by: PSYCHIATRY & NEUROLOGY

## 2022-08-10 PROCEDURE — 99499 UNLISTED E&M SERVICE: CPT | Mod: 95,,, | Performed by: PSYCHIATRY & NEUROLOGY

## 2022-08-10 PROCEDURE — 4010F PR ACE/ARB THEARPY RXD/TAKEN: ICD-10-PCS | Mod: CPTII,95,, | Performed by: PSYCHIATRY & NEUROLOGY

## 2022-08-10 PROCEDURE — 3044F PR MOST RECENT HEMOGLOBIN A1C LEVEL <7.0%: ICD-10-PCS | Mod: CPTII,95,, | Performed by: PSYCHIATRY & NEUROLOGY

## 2022-08-10 PROCEDURE — 4010F ACE/ARB THERAPY RXD/TAKEN: CPT | Mod: CPTII,95,, | Performed by: PSYCHIATRY & NEUROLOGY

## 2022-08-10 PROCEDURE — 99499 NO LOS: ICD-10-PCS | Mod: 95,,, | Performed by: PSYCHIATRY & NEUROLOGY

## 2022-08-10 NOTE — PATIENT INSTRUCTIONS
You can call [phone] if you would like to schedule a psychotherapy appointment.        Summary of the evidence on modifiable risk factors for cognitive decline and dementia             From: Eboni Huerta Carrillo, Fazio, Kim & Jud (2015). Summary of the evidence on modifiable risk factors for cognitive decline and dementia: A population-based perspective. Alzheimer's & Dementia, 11, 718-728.    *Studies suggest small or moderate alcohol consumption (no more than 1 drink per day) by older individuals may decrease the risk of cognitive decline and dementia. The evidence is not strong enough, however, to suggest those who do not drink should start drinking, especially when weighed against the potential negative effects of excessive alcohol consumption, such as an increased risk of falls among older adults. Research also generally suggests that red wine may be the best form of alcohol for cognitive functioning, in part, due to its antioxidant effects. All alcohol use is cautioned, though, as alcohol could cause harmful effects and interfere with medications. A physician and/or pharmacist should be consulted before alcohol is consumed.          Behaviors to Promote Brain Health       Exercise regularly - Exercise has many known benefits, and it appears that regular physical activity benefits the brain. Multiple research studies show that people who are physically active are less likely to experience a decline in their mental function and have a lower risk of developing Alzheimer's disease. We believe these benefits are a result of increased blood flow to your brain during exercise. It also tends to counter some of the natural reduction in brain connections that occur during aging, in effect reversing some of the problems. Aim to exercise several times per week for 30-60 minutes. You can walk, swim, play tennis or any other moderate aerobic activity that increases your heart rate.    Eat a Mediterranean  diet - Your diet plays a large role in your brain health. Consider following a Mediterranean diet, which emphasizes plant-based foods, whole grains, fish and healthy fats, such as olive oil. It incorporates much less red meat and salt than a typical American diet. Studies show people who closely follow a Mediterranean diet are less likely to have Alzheimer's disease than people who don't follow the diet. Omega fatty acids found in extra-virgin olive oil and other healthy fats are vital for your cells to function correctly, appear to decrease your risk of coronary artery disease, and increase mental focus and slow cognitive decline in older adults.       Stay mentally active - Your brain is similar to a muscle -- you need to use it or you lose it. There are many things that you can do to keep your brain in shape, such as doing crossword puzzles or Sudoku, reading, playing cards or putting together a jigsaw puzzle. Consider it cross-training your brain. So incorporate different activities to increase the effectiveness.     Stay socially involved - Social interaction helps nelson off depression and stress, both of which can contribute to memory loss. Look for opportunities to connect with loved ones, friends and others, especially if you live alone. There is research that links solitary confinement to brain atrophy, so remaining socially active may have the opposite effect and strengthen the health of your brain.    Get plenty of sleep - Sleep plays an important role in your brain health. There are some theories that sleep helps clear abnormal proteins in your brain and consolidates memories, which boosts your overall memory and brain health. It is important that you try to get seven to eight consecutive hours of sleep per night, not fragmented sleep of two- or three-hour increments. Consecutive sleep gives your brain the time to consolidate and store your memories effectively.         Heart Health and Brain  Health    What's bad for your heart is bad for your brain!    In order to keep your brain healthy, you need to keep your heart healthy. Your brain needs more oxygen than any other organ in your body, and it depends on a healthy cardiovascular system to deliver oxygen and nutrients to brain cells. Brain blood vessels are particularly susceptible to damage due to high blood pressure, which can lead to scarring and narrowing of the vessels over time. Eventually, parts of the brain tissue itself may become damaged when it lacks access to oxygen and nutrients. This damage may begin in midlife (ages 45-65) and can make your brain slower and less efficient. The damaged brain tissue is also more vulnerable to stroke or developing a neurodegenerative condition, such as Alzheimer's disease.  If you are a smoker, the risk is even higher, as smoking will also damage delicate brain blood vessels.     We have very high rates of hypertension in [place], and it is frequently associated with subjective cognitive decline (SCD).         2015 Behavioral Risk Factor Surveillance System. Prepared by the Alzheimers Association    In order to promote good cognitive and brain health, it is important to prevent, delay, and manage conditions that can impact blood flow to the brain, such as:  High blood pressure  High cholesterol   Diabetes  Coronary artery disease  Obesity  Atrial fibrillation   Sleep apnea    Ways to promote good cardiovascular and brain health include:   Follow up regularly with your doctor and take your medications as directed   Eat a healthy diet  Get regular exercise  Quit smoking  Limit alcohol consumption       Information adapted from:  https://www.alz.org/media/Documents/healthy-brain-initiative-protecting-heart-and-brain.pdf      Sleep Hygiene:     Avoid watching TV, eating, and discussing emotional issues in bed. The bed should be used for sleep and sex only. If not, we can associate the bed with other  "activities and it often becomes difficult to fall asleep.  Minimize noise, light, and temperature extremes during sleep with ear plugs, window blinds, or an electric blanket or air conditioner. Even the slightest nighttime noises or luminescent lights can disrupt the quality of your sleep. Try to keep your bedroom at a comfortable temperature -- not too hot (above 75 degrees) or too cold (below 54 degrees).  Try to go to bed and wake up at the same time every day. A strict routine can help encourage stability in your circadian rhythm. Get out of bed even if you're still tired - sleeping in much later will make it harder to fall asleep the next night, and the cycle will continue. You may need to run a "sleep deficit" for a day to help you fall asleep more easily.    Do not watch TV in bed, and do not fall asleep to TV. Many people say leaving the TV on helps them fall asleep. However, the flickering "blue light" released by screens  is absorbed even through closed eyelids, and suppresses melatonin release in your brain. This can cause us to linger in the lightest stages of sleep, and miss out on more restorative, deeper sleep. A better option might be a white noise machine or joe without any light.   Try not to drink fluids after 8 p.m. This may reduce awakenings due to urination.  Avoid naps, but if you do nap, make it no more than about 25 minutes about eight hours after you awake. But if you have problems falling asleep, then no naps for you.  Do not expose your self to bright light if you need to get up at night. Use a small night-light instead. Blue light can be particularly detrimental, including the light from your cellphone, TV, or computer screen.  Nicotine is a stimulant and should be avoided particularly near bedtime and upon night awakenings. Having a smoke before bed, although it may feel relaxing, is actually putting a stimulant into your bloodstream.  Caffeine is also a stimulant and is present in " "coffee (100-200 mg), soda (50-75 mg), tea (50-75 mg), and various over-the-counter medications. Caffeine should be discontinued at least four to six hours before bedtime. If you consume large amounts of caffeine and you cut your self off too quickly, beware; you may get headaches that could keep you awake. Sometimes people feel they are "immune" to the effects of caffeine, and that a warm cup of coffee before bed actually helps them relax. But similar to nicotine, despite that it may feel relaxing in the moment, you are still consuming a potent stimulant, and it will act on your nervous system throughout the night causing restless and disrupted sleep even if you don't notice feeling jittery at bedtime.   Avoid alcohol in the evenings. Although alcohol is a depressant and may help you fall asleep, the subsequent metabolism that clears it from your body when you are sleeping causes a withdrawal syndrome. This withdrawal causes awakenings and is often associated with nightmares and sweats.  A light snack may be sleep-inducing, but a heavy meal too close to bedtime interferes with sleep. Stay away from protein and stick to carbohydrates or dairy products. Milk contains the amino acid L-tryptophan, which has been shown in research to help people go to sleep. So milk and cookies or crackers (without chocolate) may be useful and taste good as well.  Be active during the day. Get regular exercise, help burn off excess energy, and promote more sound sleep at night.   Try to get outside into the sunlight at least once per day (with sunscreen, of course!). Your circadian rhythm is primarily regulated by the light coming through your eyes, so making sure it's fully dark at night and making sure you get some sunlight during the day can reinforce your circadian rhythm.   Use mindfulness or meditation strategies to quiet a busy mind. Many people report having difficulty sleeping due to being unable "turn off" their minds. " "Practicing mindfulness exercises before bed - like Progressive Muscle Relaxation or a body scan - can help promote relaxation and aid in anxiety reduction. Apps such as HeadSpace and Calm can be useful, and there are many freely available mindfulness videos on YouTube. If anxiety continues to interfere with your sleep, seeing a behavioral health professional to learn anxiety reduction strategies can be helpful.   If you are still struggling with sleep, or struggling to implement any of these tips, behavioral health professionals who are specially trained in sleep medicine can be helpful. Modalities such as Cognitive Behavioral Therapy for Insomnia (CBT-I) can be particularly useful.       Why does anxiety impact my thinking?    Your brain's number one job is to keep you alive, and our threat-response system is called the fight, flight, or freeze response.      When this gets turned on, our body automatically reacts within milliseconds -   Pupils dilate so we can have better vision of our surroundings  Skin becomes pale or flushed as the body redirects blood flow to the muscles, preparing us to run or fight   Heart rate and breathing become more rapid as our bodies try to take in as much oxygen as possible  Muscles tense and become primed for action, and may actually tremble or shake   Extra oxygen gets sent to the brain, increasing alertness and vigilance   Sight, hearing, and other senses get sharper     When we say "anxiety," what we are really describing is the subjective experience when this fight/flight/freeze system is activated. So, anxiety is a normal (and necessary!) human emotion. Without it, we probably wouldn't survive!     This system is designed to be FAST, but not necessarily ACCURATE. And that's ok! When there is a threat in your environment, it is usually more important that you respond quickly. For example, you may dodge out of the way of an oncoming car before you have time to really think about " "what's happening. In fact, the fight/flight/freeze response will OVERRIDE the more logical, thinking parts of your brain. It doesn't want you to waste time or energy wondering  why the bear is chasing you, it just wants you to run faster! That's why it feels impossible to think clearly when we are acutely stressed.     However, when this system gets dysregulated, we start to have problems.     If we are under chronic stress, the system is activated repeatedly. It becomes sensitized, turns on more easily, and becomes difficult to turn off. We have trouble sleeping because our body is on high alert all the time. Chronic stress is like a computer program running in the background, quietly draining all of our cognitive resources.    If we experience a trauma, this system gets turned up even more, and can get stuck in the "on" position. Often, people with a trauma history learn to live with this level of arousal after a while, and might not even notice it most of the time. However, when they get exposed to a new stressor - even a relatively minor one - they have less resources available to cope with it, because their  fight/flight/freeze sytem is ALREADY activated. So they are more prone to their body becoming overwhelmed, and may experience dissociative episodes, panic attacks, insomnia, stomach issues, and trouble thinking.      Effects of Anxiety on the Body          Central nervous system  Long-term anxiety and panic attacks can cause your brain to release stress hormones on a regular basis. This can increase the frequency of symptoms such as headaches, dizziness, and depression.    When you feel anxious and stressed, your brain floods your nervous system with hormones and chemicals designed to help you respond to a threat. Adrenaline and cortisol are two examples.    While helpful for the occasional high-stress event, long-term exposure to stress hormones can be more harmful to your physical health in the long run. " For example, long-term exposure to cortisol can contribute to weight gain, insomnia, and difficulty concentrating.    Cardiovascular system  Anxiety disorders can cause rapid heart rate, palpitations, and chest pain. You may also be at an increased risk of high blood pressure and heart disease. If you already have heart disease, anxiety disorders may raise the risk of coronary events.    Excretory and digestive systems  Anxiety also affects your excretory and digestive systems. You may have stomachaches, nausea, diarrhea, and other digestive issues. Loss of appetite can also occur.    There may be a connection between anxiety disorders and the development of irritable bowel syndrome (IBS) after a bowel infection. IBS can cause vomiting, diarrhea, or constipation.    Immune system  Anxiety can trigger your flight-or-fight stress response and release a flood of chemicals and hormones, like adrenaline, into your system.    In the short term, this increases your pulse and breathing rate, so your brain can get more oxygen. This prepares you to respond appropriately to an intense situation. Your immune system may even get a brief boost. With occasional stress, your body returns to normal functioning when the stress passes.    But if you repeatedly feel anxious and stressed or it lasts a long time, your body never gets the signal to return to normal functioning. This can weaken your immune system, leaving you more vulnerable to viral infections and frequent illnesses. Also, your regular vaccines may not work as well if you have anxiety.    Respiratory system  Anxiety causes rapid, shallow breathing. If you have chronic obstructive pulmonary disease (COPD), you may be at an increased risk of hospitalization from anxiety-related complications. Anxiety can also make asthma symptoms worse.    Other effects  Anxiety disorder can cause other symptoms, including:  Headaches  muscle tension  Insomnia  Depression  social  isolation    If you have a trauma-related disorder, you may experience flashbacks, reliving a traumatic experience over and over. You might get angry or startle easily, and perhaps become emotionally withdrawn. Other symptoms include nightmares, insomnia, and sadness.    [email or web address]/anxiety/effects-[email or web address]-body          Anxiety Coping Strategies: Try these when you're feeling anxious or stressed:  Stress management: Limit potential triggers by managing stress levels. Keep an eye on pressures and deadlines, organize daunting tasks in to-do lists, and take enough time off from professional or educational obligations.  Take a time-out. Practice yoga, listen to music, meditate, get a massage, or learn relaxation techniques. Stepping back from the problem helps clear your head.  Eat well-balanced meals. Do not skip any meals. Do keep healthful, energy-boosting snacks on hand.  Limit alcohol and caffeine, which can aggravate anxiety and trigger panic attacks.  Get enough sleep. When stressed, your body needs additional sleep and rest.  Exercise daily: Physical exertion and an active lifestyle can improve self-image and trigger the release of chemicals in the brain that stimulate positive emotions.  Welcome humor. A good laugh goes a long way.  Maintain a positive attitude. Make an effort to replace negative thoughts with positive ones.  Get involved. Volunteer or find another way to be active in your community, which creates a support network and gives you a break from everyday stress.  Learn what triggers your anxiety. Is it work, family, school, or something else you can identify? Write in a journal when youre feeling stressed or anxious, and look for a pattern.  Support network: Talk to a person who is supportive, such as a family member or friend. Avoid storing up and suppressing anxious feelings as this can worsen anxiety disorders.  Relaxation techniques: Certain measures can help reduce signs of  anxiety, including deep-breathing exercises, long baths, meditation, yoga, and resting in the dark.  Exercises to replace negative thoughts with positive ones: Write down a list of any negative thoughts, and make another list of positive thoughts to replace them. Picturing yourself successfully facing and conquering a specific fear can also provide benefits if the anxiety symptoms link to a specific stressor.  Slow breathing. When youre anxious, your breathing becomes faster and shallower. Try deliberately slowing down your breathing. Count to three as you breathe in slowly - then count to three as you breathe out slowly.  Progressive muscle relaxation. Find a quiet location. Close your eyes and slowly tense and then relax each of your muscle groups from your toes to your head. Hold the tension for three seconds and then release quickly. This can help reduce the feelings of muscle tension that often comes with anxiety.  Stay in the present moment. Anxiety can make your thoughts live in a terrible future that hasnt happened yet. Try to bring yourself back to where you are.   Practice a grounding technique: Name 5 things you can see, 4 things you can hear, 3 things you can feel, 2 things you can smell, and 1 thing you can taste.  Healthy lifestyle. Keeping active, eating well, going out into nature, spending time with family and friends, reducing stress and doing the activities you enjoy are all effective in reducing anxiety and improving your wellbeing.     Take small acts of bravery. Avoiding what makes you anxious provides some relief in the short term, but can make you more anxious in the long term. Try approaching something that makes you anxious - even in a small way. The way through anxiety is by learning that what you fear isnt likely to happen - and if it does, youll be able to cope with it.  Challenge your self-talk. How you think affects how you feel. Anxiety can make you overestimate the danger in a  situation and underestimate your ability to handle it. Try to think of different interpretations to a situation thats making you anxious, rather than jumping to the worst-case scenario. Look at the facts for and against your thought being true.  Plan worry time. Its hard to stop worrying entirely so set aside some time to indulge your worries. Even 10 minutes each evening to write them down or go over them in your head can help stop your worries from taking over at other times.  Get to know your anxiety. Keep a diary of when its at its best - and worst. Find the patterns and plan your week - or day - to proactively manage your anxiety.  Learn from others. Talking with others who also experience anxiety - or are going through something similar - can help you feel less alone.   Be kind to yourself. Remember that you are not your anxiety. You are not weak. You are not inferior. Your body is trying to keep you safe. Be gentle with it.      Your Healthy Brain - Strategies to help with learning and memory      Type of Memory Issue      Cognitive Strategy   Attention-based trouble Remember that inattention and lack of focus are major culprits to forgetting information so be sure and practice paying attention for adequate learning of information. Patients will rely on passive attention to remember something (e.g., yeah, uh-huh approach) and find they cannot recall it later. We recommend the following to improve attention, which may aid in later recall:   Look at the person as they are speaking to you, Paraphrase as they are speaking  Write down important pieces of information   Ask them to repeat if you zone out.   Simplify and reduce information that you need to focus on during conversation.   Have visual cues to remind you if you need to do something later.   Speed-based trouble Using multiple modalities (e.g., listening, writing notes, asking questions, recording, follow-up meetings with faculty/bosses,  discussion boards online) to learn new information also can be helpful and is likely to allow additional time for processing, thus improving memory for the material.    Learning new information Simplify - Use easier words and shorter sentences. Break down into steps.  Restate - Put information into your own words.  Does it make sense? This allows you and others to test for understanding.  Link - If possible, associate new information with something you already know.  Organize - Group items into meaningful categories.  You can organize by time, location, color, shape, size, function, and even age.  Be creative.   Break it up - Don't try to take in too much at one time. Concentrate for a few minutes, then move on to something else. You may learn more in short sessions than one long one.  Mnemonics (pronounced noo-mon-ics) are strategies whereby you form acronyms ( = Cereal, Oranges, Pizza) that stand for something. This may be useful at times when you need a cue or prompt to help you remember something. Word associations can also be helpful (Marisol works in the [facility]).     Storing information for later recall Rehearse - Immediately after seeing/hearing something, try to recall it.  Wait a few minutes, then check again.  Gradually lengthen the time between rehearsals. Initially, you might rehearse the same thing every minute, then 15-minutes, then every few hours.  Repetition of learned material is critical to ensure storage of information to be learned.   Self-test at home to ensure learning. Just because something was remembered once doesn't mean it will be remembered after it was first learned.   Have friends or family periodically re-quiz you multiple times in a study session.     Recalling Information Jog your memory - Lose something?  Think back to when you last had it.  What did you do next?  And after that?  Mentally walk yourself through each activity that followed.  Prodding your memory  this way may enable you to recall the location of the missing item.  Pair something you always remember (keys, purse, phone) with something you may forget (grocery list, bill you need to pay).  Get organized - Have fixed locations for all important papers, key phone numbers, medications, keys, wallet, glasses, tools, etc.  Develop routines - Routines can anchor memories so they do not drift away.      External Strategies Have memos, timers, calendar notes, etc.  in visible, appropriate places so you can use them for recalling information.  Invest in a smart phone and learn to use its reminder functions. This can be a way to interact with your children or grandchildren in a fun way.  Meet with a counselor to analyze and revise personal organization strategies and develop more effective memory cues and planning strategies. This recommendation is designed to help you develop a new skill set for personal organization based.   Maintaining a regular daily schedule may be beneficial. Keeping a calendar and notepad or alarms via a smart phone can alert you to the day's activities.   School-based learning Read the information and underline, then go back over and talk through what you just read. Break it up in paragraph bits so you keep what you have to learn to a minimum. Don't just rely on passively reading something.  Take notes in the form of an outline; Use note cards over and over  Create mnemonics (example: Please Excuse My Dear Aunramon Parmar to remember mathematics order or operations)  Create acronyms (example: PEMDAS to remember math order of operations)  Utilize self-talk as you read through the material  Create broad headings of material you need to know and then talk or write down what you have learned from memory  Teach the material to a friend, parent, or sibling without notes.  Summarize facts in a table or flow charts for visual encoding   Memory Hygiene Engage in regular exercise, which increases alertness and  arousal, which can improve attention and focus.   Get a good night's sleep, as sleep is necessary for memory consolidation. Caffeine intake in the afternoon/evening, stuffing oneself at supper, and using technology close to bedtime can decrease the quality of restful sleep throughout the night. Additionally, bedtime and wake-up times should be consistent every night and morning so the body becomes used to a single sleep/awake routine.  Eat healthy foods and balanced meals. It is notable that research indicates certain nutrients may aid in brain function, such as B vitamins (especially B6, B12, and folic acid), antioxidants (such as vitamins C and E, and beta carotene), and Omega-3 fatty acids. Talk with your physician or nutritionist about what's best.   Prospective Memory (remembering to remember to do something) We recommend having visual cues (e.g., pill boxes) and alarms (e.g., phone alarm) set to remind you to do something in advance. It's important that loved ones and caregivers understand this difficulty for you. Daily lists of what you need to do can also be helpful.         Your Healthy Brain - Strategies to help with attention and focus    Type of Attention Problem Cognitive Strategy   Auditory Attention Establish eye contact and attention to the speaker to better remember instructions/directions.  Repeat back what the speaker has said or have a notepad and write down.   If you zone out, then politely say so and have them repeat what they said.   Classes often move fast and rarely will professors adjust their style to accommodate all the different preferences and needs of students. We encourage investing in a recording device that allows you to go back and re-listen to a lecture if you have missed something when taking notes.   Reduce noise that you find distracting. It may also be helpful to have some ambient noise (e.g., white noise, calm music, fan) if you can't reduce or eliminate noise.    Reduce auditory distractions. This might include turning off your phone so you aren't tempted to look at text messages when studying or listening.      Visual Attention Reduce visual distractions. For instance, stick to one page at a time and don't have multiple pages or screens up. Make a list of common distracters and have those in mind so you can prepare ahead of time.   Use your  to your advantage by enlarging fonts, nathan, italicizing, and coloring, material.   Use visual cues to help you zero in on the material. Highlight and underline.   Be mindful that electronic devices (e.g., computers, tablets) are very prone to distractibility when attempting to read because you can be tempted to surf online, click a hyperlink, and so forth. Think about whether you prefer printed or electronic material and use that.   Our eyes can become exhausted for a while when engaging in visually based tasks. Take breaks to rest your eyes. Search online for eye relaxation exercises or talk with an optometrist.      Orienting Yourself/Monitoring/Planning Set up a plan for the day, week, and semester/month. This includes the types of classes scheduled (e.g., matching difficult and easy classes), times of day you schedule them (e.g., attempt to schedule classes at a time when most attentive, try to have some breaks between classes), which teacher/professor (e.g., some are harder than others), and types of classes (e.g., try to schedule classes with three, 50-minute lectures rather than one 3-hour class).   plan meals, exercise, and relaxation time into the schedule. This is called pacing whereby you have some strategies to manage fatigue, hunger, and restlessness that impact attention.   Use active mental strategies to avoid attentional lapses. For instance, frequently ask: (1) What I am currently doing? (2) What was I doing before this? (3) What do I need to do next? You'll likely need an external cue for  some time (e.g., alarm on phone, visual reminder) to integrate this into your day.       Sustained Attention Have realistic expectations and plan out what you can do in a given hour, day, and weeklong period. This planning involves writing down what you need to do and chunking it accordingly. For instance, you would make a list of all the steps involved in a given task, think about the time involved in each step, and plan what you can do in a given time period before your attention wanes.   Check off tasks as you do them. This feels good and helps keep you on track.   Avoid procrastination as this requires more sustained attention as you know the deadline is looming.   Don't push yourself so hard that you get frustrated and give up. It is also important to be practical about what you can and cannot do in a given time period. Don't be hard on yourself if you need more time or more breaks. The point is find what works for you and do that so you optimize performance.   Pay attention to your own vocational and personal interests in a task. It may be that you have more sustained attention for some tasks and not for others.   Set up structure so you aren't tempted to immediately distract yourself. This may mean unplugging the television, turning off the computer, and/or going somewhere (e.g., coffee shop, library) to limit distractions.      Divided Attention/Multi-tasking Most people have limited divided attention and research has shown that we are less effective when we are doing several things at once (e.g., checking email, reading online, & watching television). Try to methodically engage in one task at a time to limit problems with divided attention.   Set up some organizers so you can move from one separate task to another.   Have visual cues to keep you on track when you have a lot to do. This will re-orient you and remind you. For instance you may be doing something, get distracted, but then see your notepad  that has your next task.    External Strategies Modifying the physical space to improve attention. You may want to think about how your house, office, or the classroom impact your attention. If you can't modify something, then think about how other means. For instance, you may not be able to make your house quiet if you have children, but getting some ear plugs or ambient noise may improve this. Or, post a do not disturb sign when you need some space for studying or work.   Get organized to reduce distractibility! Develop files at home, on your computer, and even with your email.   Social support is important and many people have or have had attention problems. Talk with your family, coworkers, classmates about what they can do to help you. They may have even developed their own strategies that prove useful to you.   If you can afford to, then think about electronic devices. They have alarms to remind you along with voice recorders. Keep a planner with you or use your phone planner         Your Healthy Brain - Strategies to help with executive functions      Type of Executive Dysfunction      Cognitive Strategy   Initiation and Drive   (getting started on things) This can be exhibited behaviorally, such that they cannot get started on physical activities such as getting up or working out, socially such that they have difficulty calling friends or going out to be with friends, academically/occupationally, such that they have trouble getting started on assignments, or cognitively, such that they have difficulty coming up with ideas or generating plans.    Basic tenets of intervention include providing additional external structure, prompting and cueing, and helping organize and plan.    Increased structure in the environment or in an activity can help with initiation difficulties.  Building in routines for everyday activities is often important, since routine tasks become more automatic, reducing the need for  independently starting something.  For example, routines can be broken down into a sequence of steps, and these steps can be written down on index cards or a simple list.  You can then follow the list of steps each day as needed until the routine becomes automatic.    External prompting may be necessary to get started.  Someone might stop by the desk at the outset of each task and prompt them to start work, or perhaps demonstrate the first problem of a worksheet.  At home, parents might need to gently prompt to get started on homework, chores, or to go out with friends.   Some people benefit from having time limits set for completing a task or setting specific times of day when they will work on a task.  Use of a timer may facilitate increased initiation and speed of task completion.  Problems with initiating may be exacerbated by feeling overwhelmed with a given task.  Breaking tasks into smaller, more structured steps may reduce a sense of overwhelm and increase initiation.  Find tasks that are inherently motivating to build self-initiation   Impulsivity  (doing something without thinking it through) Have explicit, extensive and/or clear set of rules and expectations, and reviewed frequently or have posted. Collaborate when possible to have buy-in with rules.  Response delay techniques can be helpful such that you count to 5 or 10 before responding verbally or physically.    Several stop and think methods are available that teach individuals to inhibit their initial response, to consider the potential consequences of their behaviors, and to further develop a plan of approach to a situation.  Consultation with a psychologist would be helpful.   If you have an impulsive approach to tasks, then verbalize a plan of approach before starting work.  This places a short time period between impulse and action and can allow for better planning and a more strategic approach.  You could explain how you will approach a  task, including goals for accuracy and time. Also, we encourage spending some time thinking about different kinds of plans to help focus attention on possible consequences or alternate strategies.   Having unstructured (impulse time) activities are also important given the significant cognitive demands placed on individuals to inhibit impulsive behavior. This can include athletic pursuits, art, rolando, or whatever is enjoyable.     Task Persistence/Vigilance:  (sticking to it) Having a written checklist of steps required to complete a task can serve as an external guide to stick to something until all steps are completed.  Pair enjoyable tasks with something that is not well liked. For instance, play enjoyable music when you are cleaning the house. Or, provide strong incentives once an entire tasks is completed.   Work with someone who has good task persistence (and, who you like) as they can be a good motivator.    Planning/Organization Frequently, individuals with executive dysfunction have organizational difficulties and, consequently, their disorganization only serves to worsen their executive dysfunction. They will need to work with someone to not only initiate organization, but also to maintain organization over time. Once they become organized, they'll likely need some sort of visual reminder at home or in the office to continue staying organized along with a set time to organize.   Information should be presented in an organized manner (e.g., outline, power point). If this is not possible, then you will need to organize that information before you get started on a task. It will be tempting to just jump and start, but take a moment and get organized first. Also, if someone is presenting you with information (e.g., do this, then that, then this), then you'll need to write that down in an organized fashion.   Have an organization system at home and stick to a routine of maintaining it. Online tools have  amazing ideas for doing this. You can have a planner, electronic tool like an iPhone, files, color coordinated pen, wall calendar, files on your computer for certain courses or projects. Brainstorm what works best for you.  Given the particular difficulty managing complex, long-term assignments, students/employees with organizational difficulties often benefit from working on only one task, or one step of a larger task, at a time.    Tasks (big or small) may need to be broken down into smaller steps in order to facilitate organization and planning.    Seek out professional help if the above strategies are not proving effective.   Divided Attention/Multi-tasking Most people have limited divided attention and research has shown that we are less effective when we are doing several things at once (e.g., checking email, reading online, & watching television). Try to methodically engage in one task at a time to limit problems with divided attention.   Set up some organizers so you can move from one separate task to another.   Have visual cues to keep you on track when you have a lot to do. This will re-orient you and remind you. For instance you may be doing something, get distracted, but then see your notepad that has your next task.

## 2022-08-10 NOTE — PROGRESS NOTES
NEUROPSYCHOLOGICAL EVALUATION FEEDBACK    Earnestine Petit attended a feedback session today.  We discussed the results of the neuropsychological evaluation (35 minutes).  Handouts provided in AVS. All of her questions were answered.    1. Primary insomnia     2. Anxiety         PLAN:   1. Referral info provided for psychotherapy  2. Recommended pt consider sleep study, possibly CBT for insomnia  3. Can repeat testing in a few years    Nelsy Waller PsyD  Licensed Clinical Neuropsychologist  [facility] - Department of Neurology    Established Patient - Audio Only Telehealth Visit  The patient location is: [place]  The chief complaint leading to consultation is: [email or web address]  Visit type: Virtual visit with audio only (telephone)  Total time spent with patient: 35   The reason for the audio only service rather than synchronous audio and video virtual visit was related to technical difficulties or patient preference/necessity.  Each patient to whom I provide medical services by telemedicine is:  (1) informed of the relationship between the physician and patient and the respective role of any other health care provider with respect to management of the patient; and (2) notified that they may decline to receive medical services by telemedicine and may withdraw from such care at any time. Patient verbally consented to receive this service via voice-only telephone call.   This service was not originating from a related E/M service provided within the previous 7 days nor will  to an E/M service or procedure within the next 24 hours or my soonest available appointment.  Prevailing standard of care was able to be met in this audio-only visit.

## 2022-08-18 ENCOUNTER — SPECIALTY PHARMACY (OUTPATIENT)
Dept: PHARMACY | Facility: CLINIC | Age: 62
End: 2022-08-18
Payer: MEDICARE

## 2022-08-18 ENCOUNTER — PATIENT MESSAGE (OUTPATIENT)
Dept: PHARMACY | Facility: CLINIC | Age: 62
End: 2022-08-18
Payer: MEDICARE

## 2022-08-18 NOTE — TELEPHONE ENCOUNTER
Specialty Pharmacy - Refill Coordination    Specialty Medication Orders Linked to Encounter    Flowsheet Row Most Recent Value   Medication #1 etanercept (ENBREL SURECLICK) 50 mg/mL (1 mL) (Order#[id number], Rx#[id number])        Patient still has one dose on hand for next Wednesday. Verified no missed doses. Patient keeps an accurate calendar. Proceeding with refill.     Refill Questions - Documented Responses    Flowsheet Row Most Recent Value   Patient Availability and HIPAA Verification    Does patient want to proceed with activity? Yes   HIPAA/medical authority confirmed? Yes   Relationship to patient of person spoken to? Self   Refill Screening Questions    Changes to allergies? No   Changes to medications? No   New conditions since last clinic visit? No   Unplanned office visit, urgent care, ED, or hospital admission in the last 4 weeks? No   How does patient/caregiver feel medication is working? Very good   Financial problems or insurance changes? No   How many doses of your specialty medications were missed in the last 4 weeks? 0   Would patient like to speak to a pharmacist? No   When does the patient need to receive the medication? 08/31/22   Refill Delivery Questions    How will the patient receive the medication? Delivery Grace   When does the patient need to receive the medication? 08/31/22   Shipping Address Home   Address in [facility] confirmed and updated if neccessary? Yes   Expected Copay ($) 0   Is the patient able to afford the medication copay? Yes   Payment Method zero copay   Days supply of Refill 28   Supplies needed? No supplies needed   Refill activity completed? Yes   Refill activity plan Refill scheduled   Shipment/Pickup Date: 08/23/22          Current Outpatient Medications   Medication Sig    ascorbic acid, vitamin C, 1,000 mg TbSR Take 1 tablet by mouth.    atorvastatin (LIPITOR) 20 MG tablet Take 20 mg by mouth.    biotin 10,000 mcg Cap Take 1 capsule by mouth.    calcium  carbonate (OS-ALIYAH) 600 mg calcium (1,500 mg) Tab Take 1,200 mg by mouth once daily at 6am.    cholecalciferol, vitamin D3, (VITAMIN D3) 50 mcg (2,000 unit) Cap Take 1 tablet by mouth.    denosumab (PROLIA) 60 mg/mL Syrg Inject 60 mg into the skin.    etanercept (ENBREL SURECLICK) 50 mg/mL (1 mL) Inject 1 mL (50 mg total) into the skin every 7 days.    ferrous fumarate/vit Bcomp,C (SUPER B COMPLEX ORAL) Take 1 tablet by mouth.    FLAXSEED ORAL 1 (one) time each day    folic acid (FOLVITE) 1 MG tablet Take 1 tablet (1 mg total) by mouth once daily.    glucosamine HCl 1,500 mg Tab Take 1 tablet by mouth.    INOSITOL ORAL Take by mouth.    Lactobac no.41/Bifidobact no.7 (PROBIOTIC-10 ORAL) Take 1 capsule by mouth.    lisinopriL (PRINIVIL,ZESTRIL) 2.5 MG tablet Take 5 mg by mouth once daily.    melatonin 5 mg Cap Take 1 capsule by mouth every evening.    methotrexate 2.5 MG Tab Take 3 tablets (7.5 mg total) by mouth every 7 days.    metoprolol tartrate (LOPRESSOR) 25 MG tablet Take 1 tablet by mouth nightly.    multivitamin (THERAGRAN) per tablet Take 1 capsule by mouth.    omega-3 fatty acids/fish oil (FISH OIL-OMEGA-3 FATTY ACIDS) 300-1,000 mg capsule Take 1 capsule by mouth.    ondansetron (ZOFRAN-ODT) 8 MG TbDL DISSOLVE 1 TABLET BY MOUTH EVERY EIGHT HOURS  AS NEEDED FOR NAUSEA AND VOMOTING    traZODone (DESYREL) 50 MG tablet Take 1 tablet (50 mg total) by mouth every evening.   Last reviewed on 7/25/2022  3:22 PM by Meenu Piedra MA    Review of patient's allergies indicates:  No Known Allergies Last reviewed on  7/25/2022 3:22 PM by Gama Piedra      Tasks added this encounter   No tasks added.   Tasks due within next 3 months   9/20/2022 - Clinical - Follow Up Assesement (Annual)  8/23/2022 - Refill Call (Auto Added)     Rajesh Higgins, PharmD  [facility]  [address] [facility] [address]  Phone: [phone]  Fax: [phone]

## 2022-08-30 ENCOUNTER — PATIENT MESSAGE (OUTPATIENT)
Dept: ADMINISTRATIVE | Facility: HOSPITAL | Age: 62
End: 2022-08-30
Payer: MEDICARE

## 2022-08-30 ENCOUNTER — PATIENT OUTREACH (OUTPATIENT)
Dept: ADMINISTRATIVE | Facility: HOSPITAL | Age: 62
End: 2022-08-30
Payer: MEDICARE

## 2022-08-30 NOTE — PROGRESS NOTES
Care Everywhere updates requested and reviewed.  Immunizations reconciled. Media reports reviewed.  Duplicate HM overrides and  orders removed.  Overdue HM topic chart audit and/or requested.  Overdue lab testing linked to upcoming lab appointments if applies.        Health Maintenance Due   Topic Date Due    Hepatitis C Screening  Never done    Cervical Cancer Screening  Never done    HIV Screening  Never done    Shingles Vaccine (1 of 2) Never done    Pneumococcal Vaccines (Age 0-64) (2 - PPSV23 or PCV20) 2018

## 2022-09-13 ENCOUNTER — LAB VISIT (OUTPATIENT)
Dept: LAB | Facility: HOSPITAL | Age: 62
End: 2022-09-13
Attending: INTERNAL MEDICINE
Payer: MEDICARE

## 2022-09-13 ENCOUNTER — OFFICE VISIT (OUTPATIENT)
Dept: FAMILY MEDICINE | Facility: CLINIC | Age: 62
End: 2022-09-13
Payer: MEDICARE

## 2022-09-13 VITALS
HEART RATE: 62 BPM | WEIGHT: 156.75 LBS | HEIGHT: 64 IN | BODY MASS INDEX: 26.76 KG/M2 | OXYGEN SATURATION: 98 % | SYSTOLIC BLOOD PRESSURE: 112 MMHG | DIASTOLIC BLOOD PRESSURE: 84 MMHG

## 2022-09-13 DIAGNOSIS — G47.00 INSOMNIA, UNSPECIFIED TYPE: ICD-10-CM

## 2022-09-13 DIAGNOSIS — M06.9 RHEUMATOID ARTHRITIS, INVOLVING UNSPECIFIED SITE, UNSPECIFIED WHETHER RHEUMATOID FACTOR PRESENT: ICD-10-CM

## 2022-09-13 DIAGNOSIS — R35.0 FREQUENCY OF URINATION: ICD-10-CM

## 2022-09-13 DIAGNOSIS — I10 ESSENTIAL HYPERTENSION: Primary | ICD-10-CM

## 2022-09-13 DIAGNOSIS — R73.9 HYPERGLYCEMIA: ICD-10-CM

## 2022-09-13 LAB
BILIRUB UR QL STRIP: NEGATIVE
CLARITY UR: CLEAR
COLOR UR: COLORLESS
GLUCOSE UR QL STRIP: NEGATIVE
HGB UR QL STRIP: NEGATIVE
KETONES UR QL STRIP: NEGATIVE
LEUKOCYTE ESTERASE UR QL STRIP: NEGATIVE
NITRITE UR QL STRIP: NEGATIVE
PH UR STRIP: 7 [PH] (ref 5–8)
PROT UR QL STRIP: NEGATIVE
SP GR UR STRIP: <1.005 (ref 1–1.03)
URN SPEC COLLECT METH UR: ABNORMAL
UROBILINOGEN UR STRIP-ACNC: NEGATIVE EU/DL

## 2022-09-13 PROCEDURE — 1160F RVW MEDS BY RX/DR IN RCRD: CPT | Mod: CPTII,S$GLB,, | Performed by: INTERNAL MEDICINE

## 2022-09-13 PROCEDURE — 3008F PR BODY MASS INDEX (BMI) DOCUMENTED: ICD-10-PCS | Mod: CPTII,S$GLB,, | Performed by: INTERNAL MEDICINE

## 2022-09-13 PROCEDURE — 99999 PR PBB SHADOW E&M-EST. PATIENT-LVL V: ICD-10-PCS | Mod: PBBFAC,,, | Performed by: INTERNAL MEDICINE

## 2022-09-13 PROCEDURE — 4010F PR ACE/ARB THEARPY RXD/TAKEN: ICD-10-PCS | Mod: CPTII,S$GLB,, | Performed by: INTERNAL MEDICINE

## 2022-09-13 PROCEDURE — 1159F PR MEDICATION LIST DOCUMENTED IN MEDICAL RECORD: ICD-10-PCS | Mod: CPTII,S$GLB,, | Performed by: INTERNAL MEDICINE

## 2022-09-13 PROCEDURE — 3074F SYST BP LT 130 MM HG: CPT | Mod: CPTII,S$GLB,, | Performed by: INTERNAL MEDICINE

## 2022-09-13 PROCEDURE — 1160F PR REVIEW ALL MEDS BY PRESCRIBER/CLIN PHARMACIST DOCUMENTED: ICD-10-PCS | Mod: CPTII,S$GLB,, | Performed by: INTERNAL MEDICINE

## 2022-09-13 PROCEDURE — 3074F PR MOST RECENT SYSTOLIC BLOOD PRESSURE < 130 MM HG: ICD-10-PCS | Mod: CPTII,S$GLB,, | Performed by: INTERNAL MEDICINE

## 2022-09-13 PROCEDURE — 99499 RISK ADDL DX/OHS AUDIT: ICD-10-PCS | Mod: HCNC,S$GLB,, | Performed by: INTERNAL MEDICINE

## 2022-09-13 PROCEDURE — 4010F ACE/ARB THERAPY RXD/TAKEN: CPT | Mod: CPTII,S$GLB,, | Performed by: INTERNAL MEDICINE

## 2022-09-13 PROCEDURE — 3079F DIAST BP 80-89 MM HG: CPT | Mod: CPTII,S$GLB,, | Performed by: INTERNAL MEDICINE

## 2022-09-13 PROCEDURE — 81003 URINALYSIS AUTO W/O SCOPE: CPT | Performed by: INTERNAL MEDICINE

## 2022-09-13 PROCEDURE — 3008F BODY MASS INDEX DOCD: CPT | Mod: CPTII,S$GLB,, | Performed by: INTERNAL MEDICINE

## 2022-09-13 PROCEDURE — 99999 PR PBB SHADOW E&M-EST. PATIENT-LVL V: CPT | Mod: PBBFAC,,, | Performed by: INTERNAL MEDICINE

## 2022-09-13 PROCEDURE — 3079F PR MOST RECENT DIASTOLIC BLOOD PRESSURE 80-89 MM HG: ICD-10-PCS | Mod: CPTII,S$GLB,, | Performed by: INTERNAL MEDICINE

## 2022-09-13 PROCEDURE — 99499 UNLISTED E&M SERVICE: CPT | Mod: HCNC,S$GLB,, | Performed by: INTERNAL MEDICINE

## 2022-09-13 PROCEDURE — 99214 PR OFFICE/OUTPT VISIT, EST, LEVL IV, 30-39 MIN: ICD-10-PCS | Mod: S$GLB,,, | Performed by: INTERNAL MEDICINE

## 2022-09-13 PROCEDURE — 3044F HG A1C LEVEL LT 7.0%: CPT | Mod: CPTII,S$GLB,, | Performed by: INTERNAL MEDICINE

## 2022-09-13 PROCEDURE — 99214 OFFICE O/P EST MOD 30 MIN: CPT | Mod: S$GLB,,, | Performed by: INTERNAL MEDICINE

## 2022-09-13 PROCEDURE — 3044F PR MOST RECENT HEMOGLOBIN A1C LEVEL <7.0%: ICD-10-PCS | Mod: CPTII,S$GLB,, | Performed by: INTERNAL MEDICINE

## 2022-09-13 PROCEDURE — 1159F MED LIST DOCD IN RCRD: CPT | Mod: CPTII,S$GLB,, | Performed by: INTERNAL MEDICINE

## 2022-09-13 RX ORDER — TRAZODONE HYDROCHLORIDE 50 MG/1
75 TABLET ORAL NIGHTLY
Qty: 90 TABLET | Refills: 1 | Status: SHIPPED | OUTPATIENT
Start: 2022-09-13 | End: 2022-09-16

## 2022-09-13 NOTE — PROGRESS NOTES
Subjective:       Patient ID: Earnestine Petit is a 62 y.o. female.    Chief Complaint: Follow-up (3 month )      HPI  Earnestine Petit is a 62 y.o. female with chronic conditions of hypertension, hyperlipidemia, osteoporosis, RA who presents today for follow up.    Recent labs shows a normal blood cell count, normal sed rate, and metabolic.  Fasting blood glucose and A1c are normal in and last lipid profile looks good.  TSH is normal.    Reports joint pains have been stable.  If he has joint pains thinks is more osteoarthritis than rheumatoid arthritis.  Has not needed medications for pain and has not noted any swelling. She went to [place] and was not strict with her diet eating a lot of sweets.  Her weight has been stable.    Reports trazodone is helping.  Has taken 2 tablets.  Has been told she snores.  If she gets up will use the bathroom.  Noticing frequency.    Has no toxic habits.    She is up-to-date with Pap smears followed by Dr. Ajit Soto.    Health Maintenance:  Health Maintenance   Topic Date Due    Hepatitis C Screening  Never done    Mammogram  02/22/2023    TETANUS VACCINE  05/02/2027    Lipid Panel  07/06/2027       Review of Systems   All other systems reviewed and are negative.   Past Medical History:   Diagnosis Date    Arthritis     RH    Hyperlipidemia     Hypertension     Osteoporosis     Palpitations        No past surgical history on file.    Family History   Problem Relation Age of Onset    Diabetes Mother     Cancer Father         Stomach or pancreas    Heart disease Father     Hyperlipidemia Sister     Hypertension Sister     Heart disease Sister     Rheum arthritis Sister     Cancer Brother     Diabetes Maternal Grandmother     Heart disease Maternal Grandfather        Social History     Socioeconomic History    Marital status:    Tobacco Use    Smoking status: Never    Smokeless tobacco: Never   Substance and Sexual Activity    Alcohol use: Not Currently     Alcohol/week: 7.0  standard drinks     Types: 7 Glasses of wine per week     Comment: A glass of wine daily       Current Outpatient Medications   Medication Sig Dispense Refill    ascorbic acid, vitamin C, 1,000 mg TbSR Take 1 tablet by mouth.      atorvastatin (LIPITOR) 20 MG tablet Take 20 mg by mouth.      biotin 10,000 mcg Cap Take 1 capsule by mouth.      calcium carbonate (OS-ALIYAH) 600 mg calcium (1,500 mg) Tab Take 1,200 mg by mouth once daily at 6am.      cholecalciferol, vitamin D3, (VITAMIN D3) 50 mcg (2,000 unit) Cap Take 1 tablet by mouth.      denosumab (PROLIA) 60 mg/mL Syrg Inject 60 mg into the skin.      etanercept (ENBREL SURECLICK) 50 mg/mL (1 mL) Inject 1 mL (50 mg total) into the skin every 7 days. 4 mL 11    ferrous fumarate/vit Bcomp,C (SUPER B COMPLEX ORAL) Take 1 tablet by mouth.      FLAXSEED ORAL 1 (one) time each day      folic acid (FOLVITE) 1 MG tablet Take 1 tablet (1 mg total) by mouth once daily. 90 tablet 3    glucosamine HCl 1,500 mg Tab Take 1 tablet by mouth.      INOSITOL ORAL Take by mouth.      Lactobac no.41/Bifidobact no.7 (PROBIOTIC-10 ORAL) Take 1 capsule by mouth.      lisinopriL (PRINIVIL,ZESTRIL) 2.5 MG tablet Take 5 mg by mouth once daily.      melatonin 5 mg Cap Take 1 capsule by mouth every evening.      methotrexate 2.5 MG Tab Take 3 tablets (7.5 mg total) by mouth every 7 days. 12 tablet 3    metoprolol tartrate (LOPRESSOR) 25 MG tablet Take 1 tablet by mouth nightly.      multivitamin (THERAGRAN) per tablet Take 1 capsule by mouth.      omega-3 fatty acids/fish oil (FISH OIL-OMEGA-3 FATTY ACIDS) 300-1,000 mg capsule Take 1 capsule by mouth.      ondansetron (ZOFRAN-ODT) 8 MG TbDL DISSOLVE 1 TABLET BY MOUTH EVERY EIGHT HOURS  AS NEEDED FOR NAUSEA AND VOMOTING      traZODone (DESYREL) 50 MG tablet Take 1.5 tablets (75 mg total) by mouth every evening. 135 tablet 1     No current facility-administered medications for this visit.       Review of patient's allergies indicates:  No Known  Allergies      Objective:       Last 3 sets of Vitals    Vitals - 1 value per visit 7/25/2022 9/13/2022 9/13/2022   SYSTOLIC 137 - 112   DIASTOLIC 82 - 84   Pulse 50 - 62   Temp - - -   Resp - - -   SPO2 - - 98   Weight (lb) 160.5 - 156.75   Weight (kg) 72.8 - 71.1   Height 64 - 64   BMI (Calculated) 27.5 - 26.9   VISIT REPORT - - -   Pain Score  - 0 -   Physical Exam  Constitutional:       General: She is not in acute distress.     Appearance: Normal appearance.   HENT:      Head: Normocephalic.   Eyes:      General: No scleral icterus.     Extraocular Movements: Extraocular movements intact.      Conjunctiva/sclera: Conjunctivae normal.   Neck:      Vascular: No carotid bruit.      Comments: No goiter.  Cardiovascular:      Rate and Rhythm: Normal rate and regular rhythm.      Pulses: Normal pulses.      Heart sounds: Normal heart sounds.   Pulmonary:      Effort: Pulmonary effort is normal.      Breath sounds: Normal breath sounds.   Abdominal:      General: Bowel sounds are normal. There is no distension.      Palpations: Abdomen is soft. There is no mass.      Tenderness: There is no abdominal tenderness.   Musculoskeletal:         General: No swelling. Normal range of motion.   Lymphadenopathy:      Cervical: No cervical adenopathy.   Skin:     General: Skin is warm and dry.   Neurological:      General: No focal deficit present.      Mental Status: She is alert and oriented to person, place, and time.   Psychiatric:         Mood and Affect: Mood normal.         Behavior: Behavior normal.         CBC:  Recent Labs   Lab 06/06/22  0915 07/06/22  0917 07/25/22  1423   WBC 8.3 5.8 6.6   RBC 4.29 4.22 4.13   Hemoglobin 13.6 12.5 12.5   Hematocrit 40.3 38.5 38.2   Platelets 303 260 243   MCV 93.9 91.2 92.5   MCH 31.7 29.6 30.3   MCHC 33.7 32.5 32.7     CMP:  Recent Labs   Lab 12/10/21  1240 03/10/22  1409 06/06/22  0915 07/06/22  0917 07/25/22  1423   Glucose 107   < > 111 H 94 93   Calcium 10.3   < > 9.5 9.5 10.4    Albumin 4.3   < > 4.4 4.8 4.7   Total Protein 8.0   < > 7.1 7.2 7.5   Sodium 139   < > 137 138 138   Potassium 4.4   < > 4.7 4.1 4.3   CO2 26   < > 27 29 24   Chloride 105   < > 105 104 104   BUN 17   < > 18 15 15   Creatinine 0.7   < > 0.63 0.67 0.65   Alkaline Phosphatase 74  --   --   --   --    ALT 29   < > 26 21 24   AST 25   < > 25 21 24   Total Bilirubin 0.6   < > 0.4 0.6 0.3    < > = values in this interval not displayed.     URINALYSIS:  Recent Labs   Lab 09/13/22  1127   Color, UA Colorless A   Specific Gravity, UA <1.005 A   pH, UA 7.0   Protein, UA Negative   Nitrite, UA Negative   Leukocytes, UA Negative   Urobilinogen, UA Negative      LIPIDS:  Recent Labs   Lab 07/06/22  0917   TSH 3.01   HDL 55   Cholesterol 152   Triglycerides 122   LDL Cholesterol 77   HDL/Cholesterol Ratio 2.8   Non HDL Chol. (LDL+VLDL) 97     TSH:  Recent Labs   Lab 07/06/22  0917   TSH 3.01       A1C:  Recent Labs   Lab 07/06/22  0917   Hemoglobin A1C 5.4       Imaging:  XR Arthritis Survey  Narrative: EXAMINATION:  XR ARTHRITIS SURVEY    CLINICAL HISTORY:  r/o erosions;  Pain in unspecified joint    TECHNIQUE:  A lateral flexion view of the cervical spine was performed.  AP standing views of both knees were performed.  AP standing views of both feet and PA views of both hands were performed.    COMPARISON:  None 12/20/2021.    FINDINGS:  Lateral view of the cervical spine show no atlantoaxial subluxation.  Mild DJD and the narrowing of disc spaces between C5 and C7 vertebral segments.  There is a 3-4 mm C5/C6 retrolisthesis.  AP view of the hands and feet and knees shows mild DJD.  Mild hallux valgus.  No fracture or dislocation.  No bone destruction or bone erosions noted  Impression: See above    Electronically signed by: Zak Vásquez MD  Date:    07/05/2022  Time:    12:22      Assessment:       1. Essential hypertension    2. Insomnia, unspecified type    3. Rheumatoid arthritis, involving unspecified site, unspecified  whether rheumatoid factor present    4. Frequency of urination    5. Hyperglycemia              Plan:       Earnestine was seen today for follow-up.    Diagnoses and all orders for this visit:    Essential hypertension  -     Comprehensive Metabolic Panel; Future  -     Lipid Panel; Future  -     TSH; Future  - stable blood pressure    Insomnia, unspecified type  -     Ambulatory referral/consult to Sleep Disorders; Future  -     traZODone (DESYREL) 50 MG tablet; Take 1.5 tablets (75 mg total) by mouth every evening.    Rheumatoid arthritis, involving unspecified site, unspecified whether rheumatoid factor present  -     CBC Auto Differential; Future  - stable.    Frequency of urination  -     Urinalysis, Reflex to Urine Culture Urine, Clean Catch; Future    Hyperglycemia  -     Comprehensive Metabolic Panel; Future  -     Hemoglobin A1C; Future    Health Maintenance Due   Topic Date Due    Hepatitis C Screening  Never done    Cervical Cancer Screening  Never done    HIV Screening  Never done    Influenza Vaccine (1) 09/01/2022        Return to clinic in 6 months.    Vesta Peña MD  [facility]  Disclaimer:  This note has been generated using voice-recognition software. There may be grammatical or spelling errors that have been missed during proof-reading

## 2022-09-15 ENCOUNTER — PATIENT MESSAGE (OUTPATIENT)
Dept: FAMILY MEDICINE | Facility: CLINIC | Age: 62
End: 2022-09-15
Payer: MEDICARE

## 2022-09-16 ENCOUNTER — PATIENT MESSAGE (OUTPATIENT)
Dept: FAMILY MEDICINE | Facility: CLINIC | Age: 62
End: 2022-09-16
Payer: MEDICARE

## 2022-09-16 DIAGNOSIS — G47.00 INSOMNIA, UNSPECIFIED TYPE: ICD-10-CM

## 2022-09-16 RX ORDER — TRAZODONE HYDROCHLORIDE 50 MG/1
75 TABLET ORAL NIGHTLY
Qty: 135 TABLET | Refills: 1 | Status: SHIPPED | OUTPATIENT
Start: 2022-09-16 | End: 2022-12-17

## 2022-09-20 ENCOUNTER — PATIENT OUTREACH (OUTPATIENT)
Dept: ADMINISTRATIVE | Facility: HOSPITAL | Age: 62
End: 2022-09-20
Payer: MEDICARE

## 2022-09-20 NOTE — PROGRESS NOTES
Non-compliant GAP report chart review - Chart review completed for the following HM test if overdue  (Mammogram, Colonoscopy, Cervical Cancer Screening,  Diabetic lab testing, and/or Dilated EYE EXAM)  09/20/2022    Care Everywhere and Media reports - updates requested and reviewed.      Requested Pap records.    Health Maintenance Due   Topic Date Due    Hepatitis C Screening  Never done    Cervical Cancer Screening  Never done    HIV Screening  Never done

## 2022-09-20 NOTE — LETTER
AUTHORIZATION FOR RELEASE OF   CONFIDENTIAL INFORMATION    Dr. Ajit Soto,    We are seeing Earnestine Petit, date of birth 1960, in the clinic at [facility]. Vesta Peña MD is the patient's PCP. Earnestine Petit has an outstanding lab/procedure at the time we reviewed her chart. In order to help keep her health information updated, she has authorized us to request the following medical record(s):                                                   (  XX)  PAP SMEAR                                                Please fax records to [facility], Elena Rada, MD, [phone]     If you have any questions, please contact Darshana Crawford at [phone].           Patient Name: Earnestine Petit  : 1960  Patient Phone #: [phone]

## 2022-09-21 ENCOUNTER — SPECIALTY PHARMACY (OUTPATIENT)
Dept: PHARMACY | Facility: CLINIC | Age: 62
End: 2022-09-21
Payer: MEDICARE

## 2022-09-21 NOTE — TELEPHONE ENCOUNTER
Specialty Pharmacy - Refill Coordination    Specialty Medication Orders Linked to Encounter      Flowsheet Row Most Recent Value   Medication #1 etanercept (ENBREL SURECLICK) 50 mg/mL (1 mL) (Order#[id number], Rx#[id number])            Patient going out of town on a cruise from [place]10[place]. Discussed how to properly travel with Enbrel and store while on cruise. Patient thinks she has a refrigerator in her room, but will store in a secure RT location if not. Patient bringing 3 doses with her in case there is a delay. Pens will not be used more than 14 days at RT. Patient has no further questions or concerns at this time.     Refill Questions - Documented Responses      Flowsheet Row Most Recent Value   Patient Availability and HIPAA Verification    Does patient want to proceed with activity? Yes   HIPAA/medical authority confirmed? Yes   Relationship to patient of person spoken to? Self   Refill Screening Questions    Changes to allergies? No   Changes to medications? No   New conditions since last clinic visit? No   Unplanned office visit, urgent care, ED, or hospital admission in the last 4 weeks? No   How does patient/caregiver feel medication is working? Very good   Financial problems or insurance changes? No   How many doses of your specialty medications were missed in the last 4 weeks? 0   Would patient like to speak to a pharmacist? No   When does the patient need to receive the medication? 09/28/22   Refill Delivery Questions    How will the patient receive the medication? Delivery Grace   When does the patient need to receive the medication? 09/28/22   Shipping Address Home   Address in [facility] confirmed and updated if neccessary? Yes   Expected Copay ($) 0   Is the patient able to afford the medication copay? Yes   Payment Method zero copay   Days supply of Refill 28   Supplies needed? No supplies needed   Refill activity completed? Yes   Refill activity plan Refill scheduled   Shipment/Pickup Date:  09/23/22            Current Outpatient Medications   Medication Sig    ascorbic acid, vitamin C, 1,000 mg TbSR Take 1 tablet by mouth.    atorvastatin (LIPITOR) 20 MG tablet Take 20 mg by mouth.    biotin 10,000 mcg Cap Take 1 capsule by mouth.    calcium carbonate (OS-ALIYAH) 600 mg calcium (1,500 mg) Tab Take 1,200 mg by mouth once daily at 6am.    cholecalciferol, vitamin D3, (VITAMIN D3) 50 mcg (2,000 unit) Cap Take 1 tablet by mouth.    denosumab (PROLIA) 60 mg/mL Syrg Inject 60 mg into the skin.    etanercept (ENBREL SURECLICK) 50 mg/mL (1 mL) Inject 1 mL (50 mg total) into the skin every 7 days.    ferrous fumarate/vit Bcomp,C (SUPER B COMPLEX ORAL) Take 1 tablet by mouth.    FLAXSEED ORAL 1 (one) time each day    folic acid (FOLVITE) 1 MG tablet Take 1 tablet (1 mg total) by mouth once daily.    glucosamine HCl 1,500 mg Tab Take 1 tablet by mouth.    INOSITOL ORAL Take by mouth.    Lactobac no.41/Bifidobact no.7 (PROBIOTIC-10 ORAL) Take 1 capsule by mouth.    lisinopriL (PRINIVIL,ZESTRIL) 2.5 MG tablet Take 5 mg by mouth once daily.    melatonin 5 mg Cap Take 1 capsule by mouth every evening.    methotrexate 2.5 MG Tab Take 3 tablets (7.5 mg total) by mouth every 7 days.    metoprolol tartrate (LOPRESSOR) 25 MG tablet Take 1 tablet by mouth nightly.    multivitamin (THERAGRAN) per tablet Take 1 capsule by mouth.    omega-3 fatty acids/fish oil (FISH OIL-OMEGA-3 FATTY ACIDS) 300-1,000 mg capsule Take 1 capsule by mouth.    ondansetron (ZOFRAN-ODT) 8 MG TbDL DISSOLVE 1 TABLET BY MOUTH EVERY EIGHT HOURS  AS NEEDED FOR NAUSEA AND VOMOTING    traZODone (DESYREL) 50 MG tablet Take 1.5 tablets (75 mg total) by mouth every evening.   Last reviewed on 9/16/2022  9:06 PM by Vesta Peña MD    Review of patient's allergies indicates:  No Known Allergies Last reviewed on  9/16/2022 9:06 PM by Vesta Peña      Tasks added this encounter   10/19/2022 - Refill Call (Auto Added)   Tasks due within next 3 months   9/20/2022 -  Clinical - Follow Up Assesement (Annual)     Laya Barry, PharmD  [facility]  [address]  [facility] [address]  Phone: [phone]  Fax: [phone]

## 2022-09-29 ENCOUNTER — SPECIALTY PHARMACY (OUTPATIENT)
Dept: PHARMACY | Facility: CLINIC | Age: 62
End: 2022-09-29
Payer: MEDICARE

## 2022-10-10 ENCOUNTER — TELEPHONE (OUTPATIENT)
Dept: RHEUMATOLOGY | Facility: CLINIC | Age: 62
End: 2022-10-10
Payer: MEDICARE

## 2022-10-10 DIAGNOSIS — M06.9 RHEUMATOID ARTHRITIS INVOLVING MULTIPLE JOINTS: ICD-10-CM

## 2022-10-10 DIAGNOSIS — M06.9 RHEUMATOID ARTHRITIS INVOLVING MULTIPLE JOINTS: Primary | ICD-10-CM

## 2022-10-10 RX ORDER — METHOTREXATE 2.5 MG/1
7.5 TABLET ORAL
Qty: 12 TABLET | Refills: 3 | OUTPATIENT
Start: 2022-10-10 | End: 2023-10-10

## 2022-10-10 NOTE — TELEPHONE ENCOUNTER
Left voicemail regarding Dr. Emmanuel message asked patient to call the office back to schedule her labs.      ----- Message from Jeanine Emmanuel MD sent at 10/10/2022  4:08 PM CDT -----  Please let her know I cannot refill methotrexate without labs.  Once she schedules the labs, let me know.

## 2022-10-18 ENCOUNTER — PATIENT MESSAGE (OUTPATIENT)
Dept: RHEUMATOLOGY | Facility: CLINIC | Age: 62
End: 2022-10-18
Payer: MEDICARE

## 2022-10-19 ENCOUNTER — SPECIALTY PHARMACY (OUTPATIENT)
Dept: PHARMACY | Facility: CLINIC | Age: 62
End: 2022-10-19
Payer: MEDICARE

## 2022-10-19 DIAGNOSIS — M06.9 RHEUMATOID ARTHRITIS INVOLVING MULTIPLE JOINTS: Primary | ICD-10-CM

## 2022-10-19 NOTE — TELEPHONE ENCOUNTER
Specialty Pharmacy - Refill Coordination    Specialty Medication Orders Linked to Encounter      Flowsheet Row Most Recent Value   Medication #1 etanercept (ENBREL SURECLICK) 50 mg/mL (1 mL) (Order#[id number], Rx#[id number])            Refill Questions - Documented Responses      Flowsheet Row Most Recent Value   Patient Availability and HIPAA Verification    Does patient want to proceed with activity? Yes   HIPAA/medical authority confirmed? Yes   Relationship to patient of person spoken to? Self   Refill Screening Questions    Changes to allergies? No   Changes to medications? No   New conditions since last clinic visit? No   Unplanned office visit, urgent care, ED, or hospital admission in the last 4 weeks? No   How does patient/caregiver feel medication is working? Good   Financial problems or insurance changes? No   How many doses of your specialty medications were missed in the last 4 weeks? 0   Would patient like to speak to a pharmacist? No   When does the patient need to receive the medication? 10/26/22   Refill Delivery Questions    How will the patient receive the medication? MEDRx   When does the patient need to receive the medication? 10/26/22   Shipping Address Home   Address in [facility] confirmed and updated if neccessary? Yes   Expected Copay ($) 0   Is the patient able to afford the medication copay? Yes   Payment Method zero copay   Days supply of Refill 28   Supplies needed? No supplies needed   Refill activity completed? Yes   Refill activity plan Refill scheduled   Shipment/Pickup Date: 10/21/22            Current Outpatient Medications   Medication Sig    ascorbic acid, vitamin C, 1,000 mg TbSR Take 1 tablet by mouth.    atorvastatin (LIPITOR) 20 MG tablet Take 20 mg by mouth.    biotin 10,000 mcg Cap Take 1 capsule by mouth.    calcium carbonate (OS-ALIYAH) 600 mg calcium (1,500 mg) Tab Take 1,200 mg by mouth once daily at 6am.    cholecalciferol, vitamin D3, (VITAMIN D3) 50 mcg (2,000  unit) Cap Take 1 tablet by mouth.    denosumab (PROLIA) 60 mg/mL Syrg Inject 60 mg into the skin.    etanercept (ENBREL SURECLICK) 50 mg/mL (1 mL) Inject 1 mL (50 mg total) into the skin every 7 days.    ferrous fumarate/vit Bcomp,C (SUPER B COMPLEX ORAL) Take 1 tablet by mouth.    FLAXSEED ORAL 1 (one) time each day    folic acid (FOLVITE) 1 MG tablet Take 1 tablet (1 mg total) by mouth once daily.    glucosamine HCl 1,500 mg Tab Take 1 tablet by mouth.    INOSITOL ORAL Take by mouth.    Lactobac no.41/Bifidobact no.7 (PROBIOTIC-10 ORAL) Take 1 capsule by mouth.    lisinopriL (PRINIVIL,ZESTRIL) 2.5 MG tablet Take 5 mg by mouth once daily.    melatonin 5 mg Cap Take 1 capsule by mouth every evening.    methotrexate 2.5 MG Tab Take 3 tablets (7.5 mg total) by mouth every 7 days.    metoprolol tartrate (LOPRESSOR) 25 MG tablet Take 1 tablet by mouth nightly.    multivitamin (THERAGRAN) per tablet Take 1 capsule by mouth.    omega-3 fatty acids/fish oil (FISH OIL-OMEGA-3 FATTY ACIDS) 300-1,000 mg capsule Take 1 capsule by mouth.    ondansetron (ZOFRAN-ODT) 8 MG TbDL DISSOLVE 1 TABLET BY MOUTH EVERY EIGHT HOURS  AS NEEDED FOR NAUSEA AND VOMOTING    traZODone (DESYREL) 50 MG tablet Take 1.5 tablets (75 mg total) by mouth every evening.   Last reviewed on 9/16/2022  9:06 PM by Vesta Peña MD    Review of patient's allergies indicates:  No Known Allergies Last reviewed on  9/16/2022 9:06 PM by Vesta Peña      Tasks added this encounter   11/16/2022 - Refill Call (Auto Added)   Tasks due within next 3 months   No tasks due.     Shannon Albarran  [facility]  [address] [place] [id number]  Phone: [phone]  Fax: [phone]

## 2022-10-20 ENCOUNTER — PATIENT MESSAGE (OUTPATIENT)
Dept: RHEUMATOLOGY | Facility: CLINIC | Age: 62
End: 2022-10-20
Payer: MEDICARE

## 2022-10-20 ENCOUNTER — TELEPHONE (OUTPATIENT)
Dept: RHEUMATOLOGY | Facility: CLINIC | Age: 62
End: 2022-10-20
Payer: MEDICARE

## 2022-10-20 RX ORDER — METHOTREXATE 2.5 MG/1
7.5 TABLET ORAL
Qty: 12 TABLET | Refills: 0 | Status: SHIPPED | OUTPATIENT
Start: 2022-10-20 | End: 2022-11-23 | Stop reason: SDUPTHER

## 2022-10-20 NOTE — TELEPHONE ENCOUNTER
Patient do not wont to transfer care from . There's been jose e miscommunication between patient and . We will be working with patient to make sure patient has her follow up appointments and labs are scheduled.     Thank you,   Jessika

## 2022-10-24 LAB
ALBUMIN SERPL-MCNC: 4.3 G/DL (ref 3.6–5.1)
ALBUMIN/GLOB SERPL: 1.6 (CALC) (ref 1–2.5)
ALP SERPL-CCNC: 55 U/L (ref 37–153)
ALT SERPL-CCNC: 28 U/L (ref 6–29)
AST SERPL-CCNC: 25 U/L (ref 10–35)
BASOPHILS # BLD AUTO: 37 CELLS/UL (ref 0–200)
BASOPHILS NFR BLD AUTO: 0.6 %
BILIRUB SERPL-MCNC: 0.5 MG/DL (ref 0.2–1.2)
BUN SERPL-MCNC: 15 MG/DL (ref 7–25)
BUN/CREAT SERPL: NORMAL (CALC) (ref 6–22)
CALCIUM SERPL-MCNC: 9.5 MG/DL (ref 8.6–10.4)
CHLORIDE SERPL-SCNC: 107 MMOL/L (ref 98–110)
CO2 SERPL-SCNC: 24 MMOL/L (ref 20–32)
CREAT SERPL-MCNC: 0.62 MG/DL (ref 0.5–1.05)
CRP SERPL-MCNC: 0.8 MG/L
EGFR: 99 ML/MIN/1.73M2
EOSINOPHIL # BLD AUTO: 180 CELLS/UL (ref 15–500)
EOSINOPHIL NFR BLD AUTO: 2.9 %
ERYTHROCYTE [DISTWIDTH] IN BLOOD BY AUTOMATED COUNT: 13.3 % (ref 11–15)
ERYTHROCYTE [SEDIMENTATION RATE] IN BLOOD BY WESTERGREN METHOD: 6 MM/H
GAMMA INTERFERON BACKGROUND BLD IA-ACNC: 0.02 IU/ML
GLOBULIN SER CALC-MCNC: 2.7 G/DL (CALC) (ref 1.9–3.7)
GLUCOSE SERPL-MCNC: 83 MG/DL (ref 65–99)
HBV CORE AB SERPL QL IA: NORMAL
HBV SURFACE AG SERPL QL IA: NORMAL
HCT VFR BLD AUTO: 37.5 % (ref 35–45)
HGB BLD-MCNC: 12 G/DL (ref 11.7–15.5)
LYMPHOCYTES # BLD AUTO: 1717 CELLS/UL (ref 850–3900)
LYMPHOCYTES NFR BLD AUTO: 27.7 %
M TB IFN-G BLD-IMP: NEGATIVE
M TB IFN-G CD4+ BCKGRND COR BLD-ACNC: 0 IU/ML
M TB IFN-G CD4+CD8+ BCKGRND COR BLD-ACNC: 0.01 IU/ML
MCH RBC QN AUTO: 29.8 PG (ref 27–33)
MCHC RBC AUTO-ENTMCNC: 32 G/DL (ref 32–36)
MCV RBC AUTO: 93.1 FL (ref 80–100)
MITOGEN IGNF BCKGRD COR BLD-ACNC: >10 IU/ML
MONOCYTES # BLD AUTO: 477 CELLS/UL (ref 200–950)
MONOCYTES NFR BLD AUTO: 7.7 %
NEUTROPHILS # BLD AUTO: 3788 CELLS/UL (ref 1500–7800)
NEUTROPHILS NFR BLD AUTO: 61.1 %
PLATELET # BLD AUTO: 226 THOUSAND/UL (ref 140–400)
PMV BLD REES-ECKER: 10.7 FL (ref 7.5–12.5)
POTASSIUM SERPL-SCNC: 4.7 MMOL/L (ref 3.5–5.3)
PROT SERPL-MCNC: 7 G/DL (ref 6.1–8.1)
RBC # BLD AUTO: 4.03 MILLION/UL (ref 3.8–5.1)
SODIUM SERPL-SCNC: 140 MMOL/L (ref 135–146)
WBC # BLD AUTO: 6.2 THOUSAND/UL (ref 3.8–10.8)

## 2022-11-16 RX ORDER — ETANERCEPT 50 MG/ML
50 SOLUTION SUBCUTANEOUS
Qty: 4 ML | Refills: 11 | Status: SHIPPED | OUTPATIENT
Start: 2022-11-16 | End: 2023-12-06 | Stop reason: SDUPTHER

## 2022-11-23 ENCOUNTER — PATIENT MESSAGE (OUTPATIENT)
Dept: RHEUMATOLOGY | Facility: CLINIC | Age: 62
End: 2022-11-23
Payer: MEDICARE

## 2022-11-23 DIAGNOSIS — M06.9 RHEUMATOID ARTHRITIS INVOLVING MULTIPLE JOINTS: ICD-10-CM

## 2022-11-23 RX ORDER — METHOTREXATE 2.5 MG/1
7.5 TABLET ORAL
Qty: 12 TABLET | Refills: 1 | Status: SHIPPED | OUTPATIENT
Start: 2022-11-23 | End: 2023-01-18

## 2022-11-23 RX ORDER — FOLIC ACID 1 MG/1
1 TABLET ORAL DAILY
Qty: 90 TABLET | Refills: 3 | Status: SHIPPED | OUTPATIENT
Start: 2022-11-23 | End: 2023-08-02 | Stop reason: SDUPTHER

## 2022-11-25 ENCOUNTER — PATIENT MESSAGE (OUTPATIENT)
Dept: PHARMACY | Facility: CLINIC | Age: 62
End: 2022-11-25
Payer: MEDICARE

## 2022-11-28 ENCOUNTER — SPECIALTY PHARMACY (OUTPATIENT)
Dept: PHARMACY | Facility: CLINIC | Age: 62
End: 2022-11-28
Payer: MEDICARE

## 2022-11-28 NOTE — TELEPHONE ENCOUNTER
Specialty Pharmacy - Refill Coordination    Specialty Medication Orders Linked to Encounter      Flowsheet Row Most Recent Value   Medication #1 etanercept (ENBREL SURECLICK) 50 mg/mL (1 mL) (Order#[id number], Rx#[id number])            Refill Questions - Documented Responses      Flowsheet Row Most Recent Value   Patient Availability and HIPAA Verification    Does patient want to proceed with activity? Yes   HIPAA/medical authority confirmed? Yes   Relationship to patient of person spoken to? Self   Refill Screening Questions    Changes to allergies? No   Changes to medications? No   New conditions since last clinic visit? No   Unplanned office visit, urgent care, ED, or hospital admission in the last 4 weeks? No   How does patient/caregiver feel medication is working? Very good   Financial problems or insurance changes? No   How many doses of your specialty medications were missed in the last 4 weeks? 0   Would patient like to speak to a pharmacist? No   When does the patient need to receive the medication? 11/30/22   Refill Delivery Questions    How will the patient receive the medication? MEDRx   When does the patient need to receive the medication? 11/30/22   Shipping Address Home   Address in [facility] confirmed and updated if neccessary? Yes   Expected Copay ($) 0   Is the patient able to afford the medication copay? Yes   Payment Method zero copay   Days supply of Refill 28   Supplies needed? No supplies needed   Refill activity completed? Yes   Refill activity plan Refill scheduled   Shipment/Pickup Date: 11/29/22            Current Outpatient Medications   Medication Sig    ascorbic acid, vitamin C, 1,000 mg TbSR Take 1 tablet by mouth.    atorvastatin (LIPITOR) 20 MG tablet Take 20 mg by mouth.    biotin 10,000 mcg Cap Take 1 capsule by mouth.    calcium carbonate (OS-ALIYAH) 600 mg calcium (1,500 mg) Tab Take 1,200 mg by mouth once daily at 6am.    cholecalciferol, vitamin D3, (VITAMIN D3) 50 mcg  (2,000 unit) Cap Take 1 tablet by mouth.    denosumab (PROLIA) 60 mg/mL Syrg Inject 60 mg into the skin.    etanercept (ENBREL SURECLICK) 50 mg/mL (1 mL) Inject 1 mL (50 mg total) into the skin every 7 days.    ferrous fumarate/vit Bcomp,C (SUPER B COMPLEX ORAL) Take 1 tablet by mouth.    FLAXSEED ORAL 1 (one) time each day    folic acid (FOLVITE) 1 MG tablet Take 1 tablet (1 mg total) by mouth once daily.    glucosamine HCl 1,500 mg Tab Take 1 tablet by mouth.    INOSITOL ORAL Take by mouth.    Lactobac no.41/Bifidobact no.7 (PROBIOTIC-10 ORAL) Take 1 capsule by mouth.    lisinopriL (PRINIVIL,ZESTRIL) 2.5 MG tablet Take 5 mg by mouth once daily.    melatonin 5 mg Cap Take 1 capsule by mouth every evening.    methotrexate 2.5 MG Tab Take 3 tablets (7.5 mg total) by mouth every 7 days.    metoprolol tartrate (LOPRESSOR) 25 MG tablet Take 1 tablet by mouth nightly.    multivitamin (THERAGRAN) per tablet Take 1 capsule by mouth.    omega-3 fatty acids/fish oil (FISH OIL-OMEGA-3 FATTY ACIDS) 300-1,000 mg capsule Take 1 capsule by mouth.    ondansetron (ZOFRAN-ODT) 8 MG TbDL DISSOLVE 1 TABLET BY MOUTH EVERY EIGHT HOURS  AS NEEDED FOR NAUSEA AND VOMOTING    traZODone (DESYREL) 50 MG tablet Take 1.5 tablets (75 mg total) by mouth every evening.   Last reviewed on 9/16/2022  9:06 PM by Vesta Peña MD    Review of patient's allergies indicates:  No Known Allergies Last reviewed on  11/23/2022 2:26 PM by Ольга Chong      Tasks added this encounter   12/21/2022 - Refill Call (Auto Added)   Tasks due within next 3 months   No tasks due.     Laya Barry, PharmD  [facility]  [address] [place] [id number]  Phone: [phone]  Fax: [phone]

## 2022-12-15 ENCOUNTER — PATIENT OUTREACH (OUTPATIENT)
Dept: ADMINISTRATIVE | Facility: HOSPITAL | Age: 62
End: 2022-12-15
Payer: MEDICARE

## 2022-12-15 NOTE — PROGRESS NOTES
Care Everywhere updates requested and reviewed.  Immunizations reconciled. Media reports reviewed.  Duplicate HM overrides and  orders removed.  Overdue HM topic chart audit and/or requested.  Overdue lab testing linked to upcoming lab appointments if applies.    Health Maintenance Due   Topic Date Due    Hepatitis C Screening  Never done    Cervical Cancer Screening  Never done    HIV Screening  Never done    COVID-19 Vaccine (5 - Booster for Moderna series) 2022

## 2022-12-16 ENCOUNTER — TELEPHONE (OUTPATIENT)
Dept: INTERNAL MEDICINE | Facility: CLINIC | Age: 62
End: 2022-12-16
Payer: MEDICARE

## 2022-12-17 ENCOUNTER — OFFICE VISIT (OUTPATIENT)
Dept: FAMILY MEDICINE | Facility: CLINIC | Age: 62
End: 2022-12-17
Payer: MEDICARE

## 2022-12-17 VITALS
BODY MASS INDEX: 25.63 KG/M2 | SYSTOLIC BLOOD PRESSURE: 120 MMHG | OXYGEN SATURATION: 100 % | HEART RATE: 62 BPM | TEMPERATURE: 98 F | HEIGHT: 64 IN | DIASTOLIC BLOOD PRESSURE: 80 MMHG | WEIGHT: 150.13 LBS

## 2022-12-17 DIAGNOSIS — F51.01 PRIMARY INSOMNIA: ICD-10-CM

## 2022-12-17 DIAGNOSIS — J30.89 ENVIRONMENTAL AND SEASONAL ALLERGIES: Primary | ICD-10-CM

## 2022-12-17 DIAGNOSIS — I10 ESSENTIAL HYPERTENSION: ICD-10-CM

## 2022-12-17 PROCEDURE — 3044F HG A1C LEVEL LT 7.0%: CPT | Mod: CPTII,S$GLB,, | Performed by: INTERNAL MEDICINE

## 2022-12-17 PROCEDURE — 4010F PR ACE/ARB THEARPY RXD/TAKEN: ICD-10-PCS | Mod: CPTII,S$GLB,, | Performed by: INTERNAL MEDICINE

## 2022-12-17 PROCEDURE — 3008F PR BODY MASS INDEX (BMI) DOCUMENTED: ICD-10-PCS | Mod: CPTII,S$GLB,, | Performed by: INTERNAL MEDICINE

## 2022-12-17 PROCEDURE — 3079F DIAST BP 80-89 MM HG: CPT | Mod: CPTII,S$GLB,, | Performed by: INTERNAL MEDICINE

## 2022-12-17 PROCEDURE — 1159F PR MEDICATION LIST DOCUMENTED IN MEDICAL RECORD: ICD-10-PCS | Mod: CPTII,S$GLB,, | Performed by: INTERNAL MEDICINE

## 2022-12-17 PROCEDURE — 99214 PR OFFICE/OUTPT VISIT, EST, LEVL IV, 30-39 MIN: ICD-10-PCS | Mod: 25,S$GLB,, | Performed by: INTERNAL MEDICINE

## 2022-12-17 PROCEDURE — 99214 OFFICE O/P EST MOD 30 MIN: CPT | Mod: 25,S$GLB,, | Performed by: INTERNAL MEDICINE

## 2022-12-17 PROCEDURE — 3044F PR MOST RECENT HEMOGLOBIN A1C LEVEL <7.0%: ICD-10-PCS | Mod: CPTII,S$GLB,, | Performed by: INTERNAL MEDICINE

## 2022-12-17 PROCEDURE — 3074F SYST BP LT 130 MM HG: CPT | Mod: CPTII,S$GLB,, | Performed by: INTERNAL MEDICINE

## 2022-12-17 PROCEDURE — 99999 PR PBB SHADOW E&M-EST. PATIENT-LVL V: ICD-10-PCS | Mod: PBBFAC,,, | Performed by: INTERNAL MEDICINE

## 2022-12-17 PROCEDURE — 1160F RVW MEDS BY RX/DR IN RCRD: CPT | Mod: CPTII,S$GLB,, | Performed by: INTERNAL MEDICINE

## 2022-12-17 PROCEDURE — 1160F PR REVIEW ALL MEDS BY PRESCRIBER/CLIN PHARMACIST DOCUMENTED: ICD-10-PCS | Mod: CPTII,S$GLB,, | Performed by: INTERNAL MEDICINE

## 2022-12-17 PROCEDURE — 3074F PR MOST RECENT SYSTOLIC BLOOD PRESSURE < 130 MM HG: ICD-10-PCS | Mod: CPTII,S$GLB,, | Performed by: INTERNAL MEDICINE

## 2022-12-17 PROCEDURE — 96372 THER/PROPH/DIAG INJ SC/IM: CPT | Mod: S$GLB,,, | Performed by: INTERNAL MEDICINE

## 2022-12-17 PROCEDURE — 1159F MED LIST DOCD IN RCRD: CPT | Mod: CPTII,S$GLB,, | Performed by: INTERNAL MEDICINE

## 2022-12-17 PROCEDURE — 3079F PR MOST RECENT DIASTOLIC BLOOD PRESSURE 80-89 MM HG: ICD-10-PCS | Mod: CPTII,S$GLB,, | Performed by: INTERNAL MEDICINE

## 2022-12-17 PROCEDURE — 3008F BODY MASS INDEX DOCD: CPT | Mod: CPTII,S$GLB,, | Performed by: INTERNAL MEDICINE

## 2022-12-17 PROCEDURE — 4010F ACE/ARB THERAPY RXD/TAKEN: CPT | Mod: CPTII,S$GLB,, | Performed by: INTERNAL MEDICINE

## 2022-12-17 PROCEDURE — 96372 PR INJECTION,THERAP/PROPH/DIAG2ST, IM OR SUBCUT: ICD-10-PCS | Mod: S$GLB,,, | Performed by: INTERNAL MEDICINE

## 2022-12-17 PROCEDURE — 99999 PR PBB SHADOW E&M-EST. PATIENT-LVL V: CPT | Mod: PBBFAC,,, | Performed by: INTERNAL MEDICINE

## 2022-12-17 RX ORDER — TRIAMCINOLONE ACETONIDE 40 MG/ML
40 INJECTION, SUSPENSION INTRA-ARTICULAR; INTRAMUSCULAR ONCE
Status: COMPLETED | OUTPATIENT
Start: 2022-12-17 | End: 2022-12-17

## 2022-12-17 RX ORDER — TRIAMCINOLONE ACETONIDE 40 MG/ML
60 INJECTION, SUSPENSION INTRA-ARTICULAR; INTRAMUSCULAR ONCE
Status: DISCONTINUED | OUTPATIENT
Start: 2022-12-17 | End: 2022-12-17

## 2022-12-17 RX ORDER — COVID-19 ANTIGEN TEST
KIT MISCELLANEOUS
COMMUNITY
Start: 2022-11-23

## 2022-12-17 RX ORDER — MONTELUKAST SODIUM 10 MG/1
10 TABLET ORAL NIGHTLY
Qty: 30 TABLET | Refills: 0 | Status: SHIPPED | OUTPATIENT
Start: 2022-12-17 | End: 2023-01-17

## 2022-12-17 RX ORDER — TRAZODONE HYDROCHLORIDE 50 MG/1
75 TABLET ORAL NIGHTLY
Qty: 135 TABLET | Refills: 3 | Status: SHIPPED | OUTPATIENT
Start: 2022-12-17 | End: 2023-09-12

## 2022-12-17 RX ORDER — FLUTICASONE PROPIONATE 50 MCG
2 SPRAY, SUSPENSION (ML) NASAL DAILY
Qty: 16 G | Refills: 2 | Status: SHIPPED | OUTPATIENT
Start: 2022-12-17 | End: 2022-12-29 | Stop reason: SDUPTHER

## 2022-12-17 RX ORDER — LISINOPRIL 5 MG/1
5 TABLET ORAL
COMMUNITY
Start: 2022-11-23 | End: 2023-03-14 | Stop reason: SDUPTHER

## 2022-12-17 RX ADMIN — TRIAMCINOLONE ACETONIDE 40 MG: 40 INJECTION, SUSPENSION INTRA-ARTICULAR; INTRAMUSCULAR at 10:12

## 2022-12-17 NOTE — PROGRESS NOTES
Subjective:       Patient ID: Earnestine Petit is a 62 y.o. female.    Chief Complaint: No chief complaint on file.      HPI  Earnestine Petit is a 62 y.o. female with chronic conditions of rheumatoid arthritis,hypertension, hyperlipidemia, osteoarthritis, insomnia, anxiety, osteoporosis who presents today for cough.    Patient reports since week before Thanksgiving started with upper respiratory tract symptoms.  Scratchy throat, dry cough that later became productive.  Around November 15 she had increased congestion and took a Z-Andrews but did not help.  Started having eat green to yellowish sputum and painful throat, and increase cough with itchy eyes.  A relative who is a nurse practitioner prescribe Augmentin as 1 of the grandkids had strep throat and the medication helped her symptoms.  Has persisted with on an off cough, itchy eyes, and congestion.  Takes Zyrtec in the morning and has been taking Claritin-D and Mucinex.  Symptoms has persisted and effects of the medications have been very limited.  Denies fever, chills, shortness of breath, changes in urine or GI symptoms.  She tested for COVID several times and was negative.  No exposure to COVID or flu that she knows.    Has history of multiple allergy and was seen an allergist in the past.  She was receiving shots to help with allergies.  She tested many allergies to environmental allergens and pets which she has.  Did not finish her injections as she got busy.    Has no toxic habits.  Rheumatoid arthritis has been stable.      Received flu shot and has not received new booster for COVID    Health Maintenance:  Health Maintenance   Topic Date Due    Hepatitis C Screening  Never done    Mammogram  02/22/2023    TETANUS VACCINE  05/02/2027    Lipid Panel  07/06/2027       Review of Systems   Constitutional:  Negative for fever.   HENT:  Positive for nasal congestion, postnasal drip and sore throat. Negative for ear pain.    Eyes:  Positive for discharge (in the  petrnig), redness and itching.   Respiratory:  Positive for cough.    Musculoskeletal:  Negative for arthralgias.   Neurological:  Negative for headaches (early but better now).   Psychiatric/Behavioral:  Positive for sleep disturbance.     Past Medical History:   Diagnosis Date    Arthritis     RH    Hyperlipidemia     Hypertension     Osteoporosis     Palpitations        No past surgical history on file.    Family History   Problem Relation Age of Onset    Diabetes Mother     Cancer Father         Stomach or pancreas    Heart disease Father     Hyperlipidemia Sister     Hypertension Sister     Heart disease Sister     Rheum arthritis Sister     Cancer Brother     Diabetes Maternal Grandmother     Heart disease Maternal Grandfather        Social History     Socioeconomic History    Marital status:    Tobacco Use    Smoking status: Never    Smokeless tobacco: Never   Substance and Sexual Activity    Alcohol use: Not Currently     Alcohol/week: 7.0 standard drinks     Types: 7 Glasses of wine per week     Comment: A glass of wine daily       Current Outpatient Medications   Medication Sig Dispense Refill    ascorbic acid, vitamin C, 1,000 mg TbSR Take 1 tablet by mouth.      atorvastatin (LIPITOR) 20 MG tablet Take 20 mg by mouth.      biotin 10,000 mcg Cap Take 1 capsule by mouth.      calcium carbonate (OS-ALIYAH) 600 mg calcium (1,500 mg) Tab Take 1,200 mg by mouth once daily at 6am.      cholecalciferol, vitamin D3, (VITAMIN D3) 50 mcg (2,000 unit) Cap Take 1 tablet by mouth.      denosumab (PROLIA) 60 mg/mL Syrg Inject 60 mg into the skin.      etanercept (ENBREL SURECLICK) 50 mg/mL (1 mL) Inject 1 mL (50 mg total) into the skin every 7 days. 4 mL 11    ferrous fumarate/vit Bcomp,C (SUPER B COMPLEX ORAL) Take 1 tablet by mouth.      FLAXSEED ORAL 1 (one) time each day      folic acid (FOLVITE) 1 MG tablet Take 1 tablet (1 mg total) by mouth once daily. 90 tablet 3    glucosamine HCl 1,500 mg Tab Take 1 tablet  by mouth.      INOSITOL ORAL Take by mouth.      Lactobac no.41/Bifidobact no.7 (PROBIOTIC-10 ORAL) Take 1 capsule by mouth.      lisinopriL (PRINIVIL,ZESTRIL) 2.5 MG tablet Take 5 mg by mouth once daily.      melatonin 5 mg Cap Take 1 capsule by mouth every evening.      methotrexate 2.5 MG Tab Take 3 tablets (7.5 mg total) by mouth every 7 days. 12 tablet 1    metoprolol tartrate (LOPRESSOR) 25 MG tablet Take 1 tablet by mouth nightly.      multivitamin (THERAGRAN) per tablet Take 1 capsule by mouth.      omega-3 fatty acids/fish oil (FISH OIL-OMEGA-3 FATTY ACIDS) 300-1,000 mg capsule Take 1 capsule by mouth.      ondansetron (ZOFRAN-ODT) 8 MG TbDL DISSOLVE 1 TABLET BY MOUTH EVERY EIGHT HOURS  AS NEEDED FOR NAUSEA AND VOMOTING      traZODone (DESYREL) 50 MG tablet Take 1.5 tablets (75 mg total) by mouth every evening. 135 tablet 1     No current facility-administered medications for this visit.       Review of patient's allergies indicates:  No Known Allergies      Objective:       Last 3 sets of Vitals    Vitals - 1 value per visit 7/25/2022 9/13/2022 9/13/2022   SYSTOLIC 137 - 112   DIASTOLIC 82 - 84   Pulse 50 - 62   Temp - - -   Resp - - -   SPO2 - - 98   Weight (lb) 160.5 - 156.75   Weight (kg) 72.8 - 71.1   Height 64 - 64   BMI (Calculated) 27.5 - 26.9   VISIT REPORT - - -   Pain Score  - 0 -   Physical Exam  Constitutional:       General: She is not in acute distress.  HENT:      Head: Normocephalic.      Right Ear: Tympanic membrane, ear canal and external ear normal.      Left Ear: Tympanic membrane, ear canal and external ear normal.      Nose: Nose normal.      Comments: Pink mucosa with clear congestion.     Mouth/Throat:      Mouth: Mucous membranes are moist.      Pharynx: No oropharyngeal exudate or posterior oropharyngeal erythema.   Eyes:      General: No scleral icterus.     Extraocular Movements: Extraocular movements intact.      Conjunctiva/sclera: Conjunctivae normal.      Comments: Mild  erythema of eyelids, no discharge.   Neck:      Vascular: No carotid bruit.      Comments: No goiter.  Cardiovascular:      Rate and Rhythm: Normal rate and regular rhythm.      Pulses: Normal pulses.      Heart sounds: Normal heart sounds.   Pulmonary:      Effort: Pulmonary effort is normal.      Breath sounds: Normal breath sounds.   Abdominal:      General: Bowel sounds are normal. There is no distension.      Palpations: Abdomen is soft. There is no mass.      Tenderness: There is no abdominal tenderness.   Musculoskeletal:         General: No swelling.   Lymphadenopathy:      Cervical: No cervical adenopathy.   Skin:     General: Skin is warm and dry.   Neurological:      General: No focal deficit present.      Mental Status: She is alert and oriented to person, place, and time.   Psychiatric:         Mood and Affect: Mood normal.         Behavior: Behavior normal.         CBC:  Recent Labs   Lab 07/06/22  0917 07/25/22  1423 10/21/22  1001   WBC 5.8 6.6 6.2   RBC 4.22 4.13 4.03   Hemoglobin 12.5 12.5 12.0   Hematocrit 38.5 38.2 37.5   Platelets 260 243 226   MCV 91.2 92.5 93.1   MCH 29.6 30.3 29.8   MCHC 32.5 32.7 32.0     CMP:  Recent Labs   Lab 12/10/21  1240 03/10/22  1409 07/06/22  0917 07/25/22  1423 10/21/22  1001   Glucose 107   < > 94 93 83   Calcium 10.3   < > 9.5 10.4 9.5   Albumin 4.3   < > 4.8 4.7 4.3   Total Protein 8.0   < > 7.2 7.5 7.0   Sodium 139   < > 138 138 140   Potassium 4.4   < > 4.1 4.3 4.7   CO2 26   < > 29 24 24   Chloride 105   < > 104 104 107   BUN 17   < > 15 15 15   Creatinine 0.7   < > 0.67 0.65 0.62   Alkaline Phosphatase 74  --   --   --   --    ALT 29   < > 21 24 28   AST 25   < > 21 24 25   Total Bilirubin 0.6   < > 0.6 0.3 0.5    < > = values in this interval not displayed.     URINALYSIS:  Recent Labs   Lab 09/13/22  1127   Color, UA Colorless A   Specific Gravity, UA <1.005 A   pH, UA 7.0   Protein, UA Negative   Nitrite, UA Negative   Leukocytes, UA Negative    Urobilinogen, UA Negative      LIPIDS:  Recent Labs   Lab 07/06/22  0917   TSH 3.01   HDL 55   Cholesterol 152   Triglycerides 122   LDL Cholesterol 77   HDL/Cholesterol Ratio 2.8   Non HDL Chol. (LDL+VLDL) 97     TSH:  Recent Labs   Lab 07/06/22  0917   TSH 3.01       A1C:  Recent Labs   Lab 07/06/22  0917   Hemoglobin A1C 5.4       Imaging:  XR Arthritis Survey  Narrative: EXAMINATION:  XR ARTHRITIS SURVEY    CLINICAL HISTORY:  r/o erosions;  Pain in unspecified joint    TECHNIQUE:  A lateral flexion view of the cervical spine was performed.  AP standing views of both knees were performed.  AP standing views of both feet and PA views of both hands were performed.    COMPARISON:  None 12/20/2021.    FINDINGS:  Lateral view of the cervical spine show no atlantoaxial subluxation.  Mild DJD and the narrowing of disc spaces between C5 and C7 vertebral segments.  There is a 3-4 mm C5/C6 retrolisthesis.  AP view of the hands and feet and knees shows mild DJD.  Mild hallux valgus.  No fracture or dislocation.  No bone destruction or bone erosions noted  Impression: See above    Electronically signed by: Zak Vásquez MD  Date:    07/05/2022  Time:    12:22      Assessment:       1. Environmental and seasonal allergies    2. Primary insomnia    3. Essential hypertension            Plan:       1. Environmental and seasonal allergies  -     symptoms more consistent with allergy which are partially treated with antihistamines.  - luticasone propionate (FLONASE) 50 mcg/actuation nasal spray; 2 sprays (100 mcg total) by Each Nostril route once daily.  Dispense: 16 g; Refill: 2  -     trial of montelukast (SINGULAIR) 10 mg tablet; Take 1 tablet (10 mg total) by mouth every evening.  Dispense: 30 tablet; Refill: 0  -     Ambulatory referral/consult to Allergy; Future; Expected date: 12/17/2022  -     triamcinolone acetonide injection 40 mg    2. Primary insomnia  -     traZODone (DESYREL) 50 MG tablet; Take 1.5 tablets (75 mg  total) by mouth every evening.  Dispense: 135 tablet; Refill: 3  - okay for melatonin or antihistamine    3. Essential hypertension   - Stable on lisinopril.       Health Maintenance Due   Topic Date Due    Hepatitis C Screening  Never done    Cervical Cancer Screening  Never done    HIV Screening  Never done    COVID-19 Vaccine (5 - Booster for Moderna series) 09/16/2022            Return to clinic as needed.    Vesta Peña MD  [facility]  Disclaimer:  This note has been generated using voice-recognition software. There may be grammatical or spelling errors that have been missed during proof-reading

## 2022-12-19 ENCOUNTER — TELEPHONE (OUTPATIENT)
Dept: ADMINISTRATIVE | Facility: OTHER | Age: 62
End: 2022-12-19
Payer: MEDICARE

## 2022-12-27 ENCOUNTER — PATIENT MESSAGE (OUTPATIENT)
Dept: PHARMACY | Facility: CLINIC | Age: 62
End: 2022-12-27
Payer: MEDICARE

## 2022-12-27 ENCOUNTER — SPECIALTY PHARMACY (OUTPATIENT)
Dept: PHARMACY | Facility: CLINIC | Age: 62
End: 2022-12-27
Payer: MEDICARE

## 2022-12-27 NOTE — TELEPHONE ENCOUNTER
Specialty Pharmacy - Refill Coordination    Specialty Medication Orders Linked to Encounter      Flowsheet Row Most Recent Value   Medication #1 etanercept (ENBREL SURECLICK) 50 mg/mL (1 mL) (Order#[id number], Rx#[id number])            Refill Questions - Documented Responses      Flowsheet Row Most Recent Value   Patient Availability and HIPAA Verification    Does patient want to proceed with activity? Yes   HIPAA/medical authority confirmed? Yes   Relationship to patient of person spoken to? Self   Refill Screening Questions    Changes to allergies? No   Changes to medications? Yes  [Added Singulair 10 mg QHS,  by PCP for 1 month]   New conditions since last clinic visit? No   Unplanned office visit, urgent care, ED, or hospital admission in the last 4 weeks? Yes  [Pt made appt with PCP for cold/allergy]   How does patient/caregiver feel medication is working? Good   Financial problems or insurance changes? No   How many doses of your specialty medications were missed in the last 4 weeks? 0   Would patient like to speak to a pharmacist? No   When does the patient need to receive the medication? 01/04/23   Refill Delivery Questions    How will the patient receive the medication? [facility]   When does the patient need to receive the medication? 01/04/23   Shipping Address Home   Address in [facility] confirmed and updated if neccessary? Yes   Expected Copay ($) 0   Is the patient able to afford the medication copay? Yes   Payment Method zero copay   Days supply of Refill 28   Supplies needed? No supplies needed   Refill activity completed? Yes   Refill activity plan Refill scheduled   Shipment/Pickup Date: 12/29/22            Current Outpatient Medications   Medication Sig    ascorbic acid, vitamin C, 1,000 mg TbSR Take 1 tablet by mouth.    atorvastatin (LIPITOR) 20 MG tablet Take 20 mg by mouth.    biotin 10,000 mcg Cap Take 1 capsule by mouth.    calcium carbonate (OS-ALIYAH) 600 mg calcium (1,500 mg) Tab Take  1,200 mg by mouth once daily at 6am.    cholecalciferol, vitamin D3, (VITAMIN D3) 50 mcg (2,000 unit) Cap Take 1 tablet by mouth.    denosumab (PROLIA) 60 mg/mL Syrg Inject 60 mg into the skin.    etanercept (ENBREL SURECLICK) 50 mg/mL (1 mL) Inject 1 mL (50 mg total) into the skin every 7 days.    ferrous fumarate/vit Bcomp,C (SUPER B COMPLEX ORAL) Take 1 tablet by mouth.    FLAXSEED ORAL 1 (one) time each day    FLOWFLEX COVID-19 AG HOME TEST Kit USE AS DIRECTED ON PACKAGE INSERT.    fluticasone propionate (FLONASE) 50 mcg/actuation nasal spray 2 sprays (100 mcg total) by Each Nostril route once daily.    folic acid (FOLVITE) 1 MG tablet Take 1 tablet (1 mg total) by mouth once daily.    glucosamine HCl 1,500 mg Tab Take 1 tablet by mouth.    INOSITOL ORAL Take by mouth.    Lactobac no.41/Bifidobact no.7 (PROBIOTIC-10 ORAL) Take 1 capsule by mouth.    lisinopriL (PRINIVIL,ZESTRIL) 2.5 MG tablet Take 5 mg by mouth once daily.    lisinopriL (PRINIVIL,ZESTRIL) 5 MG tablet Take 5 mg by mouth.    melatonin 5 mg Cap Take 1 capsule by mouth every evening.    methotrexate 2.5 MG Tab Take 3 tablets (7.5 mg total) by mouth every 7 days.    metoprolol tartrate (LOPRESSOR) 25 MG tablet Take 1 tablet by mouth nightly.    montelukast (SINGULAIR) 10 mg tablet Take 1 tablet (10 mg total) by mouth every evening.    multivitamin (THERAGRAN) per tablet Take 1 capsule by mouth.    omega-3 fatty acids/fish oil (FISH OIL-OMEGA-3 FATTY ACIDS) 300-1,000 mg capsule Take 1 capsule by mouth.    ondansetron (ZOFRAN-ODT) 8 MG TbDL DISSOLVE 1 TABLET BY MOUTH EVERY EIGHT HOURS  AS NEEDED FOR NAUSEA AND VOMOTING    traZODone (DESYREL) 50 MG tablet Take 1.5 tablets (75 mg total) by mouth every evening.   Last reviewed on 12/17/2022 10:31 AM by Vesta Peña MD    Review of patient's allergies indicates:  No Known Allergies Last reviewed on  12/17/2022 10:31 AM by Vesta Peña      Tasks added this encounter   1/25/2023 - Refill Call (Auto Added)    Tasks due within next 3 months   No tasks due.     Julianne Sanon, PharmD  [facility]  [address] [facility] [address]  Phone: [phone]  Fax: [phone]

## 2022-12-28 ENCOUNTER — PATIENT MESSAGE (OUTPATIENT)
Dept: RHEUMATOLOGY | Facility: CLINIC | Age: 62
End: 2022-12-28
Payer: MEDICARE

## 2022-12-29 ENCOUNTER — OFFICE VISIT (OUTPATIENT)
Dept: ALLERGY | Facility: CLINIC | Age: 62
End: 2022-12-29
Payer: MEDICARE

## 2022-12-29 ENCOUNTER — LAB VISIT (OUTPATIENT)
Dept: LAB | Facility: HOSPITAL | Age: 62
End: 2022-12-29
Payer: MEDICARE

## 2022-12-29 VITALS — HEIGHT: 64 IN | BODY MASS INDEX: 25.56 KG/M2 | WEIGHT: 149.69 LBS

## 2022-12-29 DIAGNOSIS — R05.3 CHRONIC COUGH: ICD-10-CM

## 2022-12-29 DIAGNOSIS — J30.89 ENVIRONMENTAL AND SEASONAL ALLERGIES: ICD-10-CM

## 2022-12-29 DIAGNOSIS — J31.0 CHRONIC RHINITIS: ICD-10-CM

## 2022-12-29 DIAGNOSIS — H10.423 SIMPLE CHRONIC CONJUNCTIVITIS OF BOTH EYES: Primary | ICD-10-CM

## 2022-12-29 DIAGNOSIS — H10.423 SIMPLE CHRONIC CONJUNCTIVITIS OF BOTH EYES: ICD-10-CM

## 2022-12-29 DIAGNOSIS — M06.9 RHEUMATOID ARTHRITIS INVOLVING MULTIPLE JOINTS: Primary | ICD-10-CM

## 2022-12-29 PROCEDURE — 99204 PR OFFICE/OUTPT VISIT, NEW, LEVL IV, 45-59 MIN: ICD-10-PCS | Mod: S$GLB,,, | Performed by: ALLERGY & IMMUNOLOGY

## 2022-12-29 PROCEDURE — 36415 COLL VENOUS BLD VENIPUNCTURE: CPT | Mod: PO | Performed by: ALLERGY & IMMUNOLOGY

## 2022-12-29 PROCEDURE — 1159F PR MEDICATION LIST DOCUMENTED IN MEDICAL RECORD: ICD-10-PCS | Mod: CPTII,S$GLB,, | Performed by: ALLERGY & IMMUNOLOGY

## 2022-12-29 PROCEDURE — 3044F HG A1C LEVEL LT 7.0%: CPT | Mod: CPTII,S$GLB,, | Performed by: ALLERGY & IMMUNOLOGY

## 2022-12-29 PROCEDURE — 1160F PR REVIEW ALL MEDS BY PRESCRIBER/CLIN PHARMACIST DOCUMENTED: ICD-10-PCS | Mod: CPTII,S$GLB,, | Performed by: ALLERGY & IMMUNOLOGY

## 2022-12-29 PROCEDURE — 86003 ALLG SPEC IGE CRUDE XTRC EA: CPT | Performed by: ALLERGY & IMMUNOLOGY

## 2022-12-29 PROCEDURE — 99204 OFFICE O/P NEW MOD 45 MIN: CPT | Mod: S$GLB,,, | Performed by: ALLERGY & IMMUNOLOGY

## 2022-12-29 PROCEDURE — 99999 PR PBB SHADOW E&M-EST. PATIENT-LVL IV: CPT | Mod: PBBFAC,,, | Performed by: ALLERGY & IMMUNOLOGY

## 2022-12-29 PROCEDURE — 3044F PR MOST RECENT HEMOGLOBIN A1C LEVEL <7.0%: ICD-10-PCS | Mod: CPTII,S$GLB,, | Performed by: ALLERGY & IMMUNOLOGY

## 2022-12-29 PROCEDURE — 1159F MED LIST DOCD IN RCRD: CPT | Mod: CPTII,S$GLB,, | Performed by: ALLERGY & IMMUNOLOGY

## 2022-12-29 PROCEDURE — 99999 PR PBB SHADOW E&M-EST. PATIENT-LVL IV: ICD-10-PCS | Mod: PBBFAC,,, | Performed by: ALLERGY & IMMUNOLOGY

## 2022-12-29 PROCEDURE — 4010F ACE/ARB THERAPY RXD/TAKEN: CPT | Mod: CPTII,S$GLB,, | Performed by: ALLERGY & IMMUNOLOGY

## 2022-12-29 PROCEDURE — 3008F BODY MASS INDEX DOCD: CPT | Mod: CPTII,S$GLB,, | Performed by: ALLERGY & IMMUNOLOGY

## 2022-12-29 PROCEDURE — 3008F PR BODY MASS INDEX (BMI) DOCUMENTED: ICD-10-PCS | Mod: CPTII,S$GLB,, | Performed by: ALLERGY & IMMUNOLOGY

## 2022-12-29 PROCEDURE — 4010F PR ACE/ARB THEARPY RXD/TAKEN: ICD-10-PCS | Mod: CPTII,S$GLB,, | Performed by: ALLERGY & IMMUNOLOGY

## 2022-12-29 PROCEDURE — 1160F RVW MEDS BY RX/DR IN RCRD: CPT | Mod: CPTII,S$GLB,, | Performed by: ALLERGY & IMMUNOLOGY

## 2022-12-29 PROCEDURE — 86003 ALLG SPEC IGE CRUDE XTRC EA: CPT | Mod: 59 | Performed by: ALLERGY & IMMUNOLOGY

## 2022-12-29 RX ORDER — CETIRIZINE HYDROCHLORIDE 10 MG/1
10 TABLET ORAL DAILY
COMMUNITY

## 2022-12-29 RX ORDER — FLUTICASONE PROPIONATE 50 MCG
2 SPRAY, SUSPENSION (ML) NASAL DAILY
Qty: 16 G | Refills: 12 | Status: SHIPPED | OUTPATIENT
Start: 2022-12-29

## 2022-12-29 NOTE — PROGRESS NOTES
Subjective:       Patient ID: Earnestine Petit is a 62 y.o. female.    Chief Complaint:  Allergies (Itchy watery eyes, PND, sneezing, runny nose ) and Nasal Congestion      61 yo woman presents for consult from Dr Peña for possible allergies. She states she has had allergy issues lifelong. Had test decades ago and told allergic to pets. She has had always had pets and been ok but 2 months ago got a new dog and is much worse since then. She did shots years ago and were helping but had to stop due to insurance. With current dog has itchy watery red eyes, runny nose, itchy nose, PND and bad cough. Bad over 2 months. Mucus is clear. Has some SOB when cough is bad. Was treated for sinus infection and antibiotic did help but not clear completely. Feels like cough is like smoker. She has been on Singulair for lat couple weeks and no help. She has added Claritin D when congested, Mucinex prn. Uses Flonase but not often only prn. Us on zyrtec or Claritin daily. No asthma. No eczema. No food insect or latex allergy.       Environmental History: see history section for home environment  Review of Systems   Constitutional:  Negative for appetite change, chills, fatigue and fever.   HENT:  Positive for congestion, facial swelling, postnasal drip and rhinorrhea. Negative for ear discharge, ear pain, nosebleeds, sinus pressure, sneezing, sore throat, trouble swallowing and voice change.    Eyes:  Positive for discharge, redness and itching. Negative for visual disturbance.   Respiratory:  Positive for cough. Negative for choking, chest tightness, shortness of breath and wheezing.    Cardiovascular:  Negative for chest pain, palpitations and leg swelling.   Gastrointestinal:  Negative for abdominal distention, abdominal pain, constipation, diarrhea, nausea and vomiting.   Genitourinary:  Negative for difficulty urinating.   Musculoskeletal:  Negative for arthralgias, gait problem, joint swelling and myalgias.   Skin:  Negative for  color change and rash.   Neurological:  Negative for dizziness, syncope, weakness, light-headedness and headaches.   Hematological:  Negative for adenopathy. Does not bruise/bleed easily.   Psychiatric/Behavioral:  Negative for agitation, behavioral problems, confusion and sleep disturbance. The patient is not nervous/anxious.       Objective:      Physical Exam  Vitals and nursing note reviewed.   Constitutional:       General: She is not in acute distress.     Appearance: Normal appearance. She is not ill-appearing.   HENT:      Head: Normocephalic and atraumatic.      Right Ear: External ear normal.      Left Ear: External ear normal.      Nose: No rhinorrhea.   Eyes:      General:         Right eye: No discharge.         Left eye: No discharge.      Conjunctiva/sclera: Conjunctivae normal.   Cardiovascular:      Rate and Rhythm: Normal rate and regular rhythm.   Pulmonary:      Effort: Pulmonary effort is normal. No respiratory distress.   Abdominal:      General: There is no distension.   Musculoskeletal:         General: Normal range of motion.      Cervical back: Normal range of motion.   Skin:     General: Skin is warm and dry.      Findings: No erythema or rash.   Neurological:      Mental Status: She is alert and oriented to person, place, and time.   Psychiatric:         Mood and Affect: Mood normal.         Behavior: Behavior normal.         Thought Content: Thought content normal.         Judgment: Judgment normal.       Laboratory:   none performed   Assessment:       1. Simple chronic conjunctivitis of both eyes    2. Chronic rhinitis    3. Chronic cough    4. Environmental and seasonal allergies         Plan:       Immunocaps to identify allergic triggers, if allergic to dog then needs to place air purifiers in bedroom and living area and keep dog out of bedroom  Fluticasone 2 SNE daily every day  Increase cetirizine to 10 mg BID  Phone review

## 2023-01-03 LAB
A ALTERNATA IGE QN: <0.1 KU/L
A FUMIGATUS IGE QN: <0.1 KU/L
ALLERGEN CHAETOMIUM GLOBOSUM IGE: <0.1 KU/L
ALLERGEN WALNUT TREE IGE: <0.1 KU/L
ALLERGEN WHITE PINE TREE IGE: <0.1 KU/L
BAHIA GRASS IGE QN: <0.1 KU/L
BALD CYPRESS IGE QN: <0.1 KU/L
BERMUDA GRASS IGE QN: <0.1 KU/L
C HERBARUM IGE QN: <0.1 KU/L
C LUNATA IGE QN: <0.1 KU/L
CAT DANDER IGE QN: <0.1 KU/L
CHAETOMIUM GLOB. CLASS: NORMAL
COMMON RAGWEED IGE QN: <0.1 KU/L
COTTONWOOD IGE QN: <0.1 KU/L
D FARINAE IGE QN: <0.1 KU/L
D PTERONYSS IGE QN: <0.1 KU/L
DEPRECATED A ALTERNATA IGE RAST QL: NORMAL
DEPRECATED A FUMIGATUS IGE RAST QL: NORMAL
DEPRECATED BAHIA GRASS IGE RAST QL: NORMAL
DEPRECATED BALD CYPRESS IGE RAST QL: NORMAL
DEPRECATED BERMUDA GRASS IGE RAST QL: NORMAL
DEPRECATED C HERBARUM IGE RAST QL: NORMAL
DEPRECATED C LUNATA IGE RAST QL: NORMAL
DEPRECATED CAT DANDER IGE RAST QL: NORMAL
DEPRECATED COMMON RAGWEED IGE RAST QL: NORMAL
DEPRECATED COTTONWOOD IGE RAST QL: NORMAL
DEPRECATED D FARINAE IGE RAST QL: NORMAL
DEPRECATED D PTERONYSS IGE RAST QL: NORMAL
DEPRECATED DOG DANDER IGE RAST QL: ABNORMAL
DEPRECATED ELDER IGE RAST QL: NORMAL
DEPRECATED ENGL PLANTAIN IGE RAST QL: NORMAL
DEPRECATED GUINEA PIG EPITH IGE RAST QL: NORMAL
DEPRECATED HORSE DANDER IGE RAST QL: NORMAL
DEPRECATED JOHNSON GRASS IGE RAST QL: NORMAL
DEPRECATED LONDON PLANE IGE RAST QL: NORMAL
DEPRECATED MUGWORT IGE RAST QL: NORMAL
DEPRECATED P NOTATUM IGE RAST QL: NORMAL
DEPRECATED PECAN/HICK TREE IGE RAST QL: NORMAL
DEPRECATED ROACH IGE RAST QL: NORMAL
DEPRECATED S ROSTRATA IGE RAST QL: NORMAL
DEPRECATED SALTWORT IGE RAST QL: NORMAL
DEPRECATED SILVER BIRCH IGE RAST QL: NORMAL
DEPRECATED TIMOTHY IGE RAST QL: NORMAL
DEPRECATED WEST RAGWEED IGE RAST QL: NORMAL
DEPRECATED WHITE OAK IGE RAST QL: NORMAL
DEPRECATED WILLOW IGE RAST QL: NORMAL
DOG DANDER IGE QN: 0.26 KU/L
ELDER IGE QN: <0.1 KU/L
ENGL PLANTAIN IGE QN: <0.1 KU/L
GUINEA PIG EPITH IGE QN: <0.1 KU/L
HORSE DANDER IGE QN: <0.1 KU/L
JOHNSON GRASS IGE QN: <0.1 KU/L
LONDON PLANE IGE QN: <0.1 KU/L
MUGWORT IGE QN: <0.1 KU/L
P NOTATUM IGE QN: <0.1 KU/L
PECAN/HICK TREE IGE QN: <0.1 KU/L
ROACH IGE QN: <0.1 KU/L
S ROSTRATA IGE QN: <0.1 KU/L
SALTWORT IGE QN: <0.1 KU/L
SILVER BIRCH IGE QN: <0.1 KU/L
TIMOTHY IGE QN: <0.1 KU/L
WALNUT TREE CLASS: NORMAL
WEST RAGWEED IGE QN: <0.1 KU/L
WHITE OAK IGE QN: <0.1 KU/L
WHITE PINE CLASS: NORMAL
WILLOW IGE QN: <0.1 KU/L

## 2023-01-04 LAB
ALBUMIN SERPL-MCNC: 4.6 G/DL (ref 3.6–5.1)
ALBUMIN/GLOB SERPL: 1.5 (CALC) (ref 1–2.5)
ALP SERPL-CCNC: 59 U/L (ref 37–153)
ALT SERPL-CCNC: 25 U/L (ref 6–29)
AST SERPL-CCNC: 22 U/L (ref 10–35)
BASOPHILS # BLD AUTO: 44 CELLS/UL (ref 0–200)
BASOPHILS NFR BLD AUTO: 0.5 %
BILIRUB SERPL-MCNC: 0.5 MG/DL (ref 0.2–1.2)
BUN SERPL-MCNC: 17 MG/DL (ref 7–25)
BUN/CREAT SERPL: NORMAL (CALC) (ref 6–22)
CALCIUM SERPL-MCNC: 9.6 MG/DL (ref 8.6–10.4)
CHLORIDE SERPL-SCNC: 105 MMOL/L (ref 98–110)
CO2 SERPL-SCNC: 29 MMOL/L (ref 20–32)
CREAT SERPL-MCNC: 0.76 MG/DL (ref 0.5–1.05)
CRP SERPL-MCNC: 0.4 MG/L
EGFR: 87 ML/MIN/1.73M2
EOSINOPHIL # BLD AUTO: 150 CELLS/UL (ref 15–500)
EOSINOPHIL NFR BLD AUTO: 1.7 %
ERYTHROCYTE [DISTWIDTH] IN BLOOD BY AUTOMATED COUNT: 14 % (ref 11–15)
ERYTHROCYTE [SEDIMENTATION RATE] IN BLOOD BY WESTERGREN METHOD: 6 MM/H
GLOBULIN SER CALC-MCNC: 3 G/DL (CALC) (ref 1.9–3.7)
GLUCOSE SERPL-MCNC: 90 MG/DL (ref 65–99)
HCT VFR BLD AUTO: 42.1 % (ref 35–45)
HGB BLD-MCNC: 13.5 G/DL (ref 11.7–15.5)
LYMPHOCYTES # BLD AUTO: 3494 CELLS/UL (ref 850–3900)
LYMPHOCYTES NFR BLD AUTO: 39.7 %
MCH RBC QN AUTO: 30.1 PG (ref 27–33)
MCHC RBC AUTO-ENTMCNC: 32.1 G/DL (ref 32–36)
MCV RBC AUTO: 94 FL (ref 80–100)
MONOCYTES # BLD AUTO: 563 CELLS/UL (ref 200–950)
MONOCYTES NFR BLD AUTO: 6.4 %
NEUTROPHILS # BLD AUTO: 4550 CELLS/UL (ref 1500–7800)
NEUTROPHILS NFR BLD AUTO: 51.7 %
PLATELET # BLD AUTO: 262 THOUSAND/UL (ref 140–400)
PMV BLD REES-ECKER: 10.6 FL (ref 7.5–12.5)
POTASSIUM SERPL-SCNC: 4.2 MMOL/L (ref 3.5–5.3)
PROT SERPL-MCNC: 7.6 G/DL (ref 6.1–8.1)
RBC # BLD AUTO: 4.48 MILLION/UL (ref 3.8–5.1)
SODIUM SERPL-SCNC: 139 MMOL/L (ref 135–146)
WBC # BLD AUTO: 8.8 THOUSAND/UL (ref 3.8–10.8)

## 2023-01-05 ENCOUNTER — OFFICE VISIT (OUTPATIENT)
Dept: RHEUMATOLOGY | Facility: CLINIC | Age: 63
End: 2023-01-05
Payer: MEDICARE

## 2023-01-05 VITALS
WEIGHT: 151.88 LBS | DIASTOLIC BLOOD PRESSURE: 88 MMHG | SYSTOLIC BLOOD PRESSURE: 142 MMHG | HEART RATE: 70 BPM | BODY MASS INDEX: 25.93 KG/M2 | HEIGHT: 64 IN

## 2023-01-05 DIAGNOSIS — M06.9 RHEUMATOID ARTHRITIS INVOLVING MULTIPLE JOINTS: Primary | ICD-10-CM

## 2023-01-05 PROCEDURE — 3077F SYST BP >= 140 MM HG: CPT | Mod: CPTII,S$GLB,, | Performed by: INTERNAL MEDICINE

## 2023-01-05 PROCEDURE — 3008F PR BODY MASS INDEX (BMI) DOCUMENTED: ICD-10-PCS | Mod: CPTII,S$GLB,, | Performed by: INTERNAL MEDICINE

## 2023-01-05 PROCEDURE — 3077F PR MOST RECENT SYSTOLIC BLOOD PRESSURE >= 140 MM HG: ICD-10-PCS | Mod: CPTII,S$GLB,, | Performed by: INTERNAL MEDICINE

## 2023-01-05 PROCEDURE — 99999 PR PBB SHADOW E&M-EST. PATIENT-LVL IV: ICD-10-PCS | Mod: PBBFAC,,, | Performed by: INTERNAL MEDICINE

## 2023-01-05 PROCEDURE — 1159F PR MEDICATION LIST DOCUMENTED IN MEDICAL RECORD: ICD-10-PCS | Mod: CPTII,S$GLB,, | Performed by: INTERNAL MEDICINE

## 2023-01-05 PROCEDURE — 99214 PR OFFICE/OUTPT VISIT, EST, LEVL IV, 30-39 MIN: ICD-10-PCS | Mod: S$GLB,,, | Performed by: INTERNAL MEDICINE

## 2023-01-05 PROCEDURE — 1159F MED LIST DOCD IN RCRD: CPT | Mod: CPTII,S$GLB,, | Performed by: INTERNAL MEDICINE

## 2023-01-05 PROCEDURE — 99214 OFFICE O/P EST MOD 30 MIN: CPT | Mod: S$GLB,,, | Performed by: INTERNAL MEDICINE

## 2023-01-05 PROCEDURE — 3008F BODY MASS INDEX DOCD: CPT | Mod: CPTII,S$GLB,, | Performed by: INTERNAL MEDICINE

## 2023-01-05 PROCEDURE — 99499 UNLISTED E&M SERVICE: CPT | Mod: HCNC,S$GLB,, | Performed by: INTERNAL MEDICINE

## 2023-01-05 PROCEDURE — 3079F PR MOST RECENT DIASTOLIC BLOOD PRESSURE 80-89 MM HG: ICD-10-PCS | Mod: CPTII,S$GLB,, | Performed by: INTERNAL MEDICINE

## 2023-01-05 PROCEDURE — 99999 PR PBB SHADOW E&M-EST. PATIENT-LVL IV: CPT | Mod: PBBFAC,,, | Performed by: INTERNAL MEDICINE

## 2023-01-05 PROCEDURE — 3079F DIAST BP 80-89 MM HG: CPT | Mod: CPTII,S$GLB,, | Performed by: INTERNAL MEDICINE

## 2023-01-05 PROCEDURE — 99499 RISK ADDL DX/OHS AUDIT: ICD-10-PCS | Mod: HCNC,S$GLB,, | Performed by: INTERNAL MEDICINE

## 2023-01-05 NOTE — PROGRESS NOTES
Rapid3 Question Responses and Scores 1/2/2023   MDHAQ Score 0   Psychologic Score 0   Pain Score 0   When you awakened in the morning OVER THE LAST WEEK, did you feel stiff? No   If Yes, please indicate the number of hours until you are as limber as you will be for the day -   Fatigue Score 0   Global Health Score 0   RAPID3 Score 0     Answers submitted by the patient for this visit:  Rheumatology Questionnaire (Submitted on 1/2/2023)  fever: No  eye redness: No  mouth sores: No  headaches: No  shortness of breath: No  chest pain: No  trouble swallowing: No  diarrhea: No  constipation: No  unexpected weight change: No  genital sore: No  dysuria: No  During the last 3 days, have you had a skin rash?: No  Bruises or bleeds easily: No  cough: No

## 2023-01-05 NOTE — PROGRESS NOTES
Subjective:       Patient ID: Earnestine Petit is a 61 y.o. female.    Chief Complaint: Disease Management    HPI 61 year old F with PMH of RA (around age 57), osteoporosis, palpitations here for evaluation.  When she was fist diagnosed with RA, she had involvement of shoulders, elbows, hands,wrists, knees, ankle, and feet.  She was started on MTX and prednisone at time of diagnosis.  She is taking MTX 3 pills once a week for a year and also on enbrel for over 3 years. She is taking folic acid 1 mg a day.  She has mild pain in right knee, right wrist, and some pain in hands with making a fist. She also has pain in feet. She reports she gets swelling in left second finger and other fingers on left hand. On occasion, her right knee will get swollen.  Resting improves the pain. Denies any rashes, oral ulcers, hair loss, fevers,or photosensitivity. She smoked when she was 14 just socially.      She is on prolia for a year and half for osteoporosis. She was on avista for 3 years.  She has had osteoporosis since age 52.    Family hx: sister: RA  Aunt: RA    Interval history: She is taking MTX 3 pills once a week. She is taking folic acid  1 mg a day.  She is on enbrel. Denies any joint pain or swelling.    Past Medical History:   Diagnosis Date    Arthritis     RH    Hyperlipidemia     Hypertension     Osteoporosis        Review of Systems   Constitutional: Negative for activity change, appetite change, chills, diaphoresis, fatigue, fever and unexpected weight change.   HENT: Negative for congestion, ear discharge, ear pain, facial swelling, mouth sores, sinus pressure, sneezing, sore throat, tinnitus and trouble swallowing.    Eyes: Negative for photophobia, pain, discharge, redness, itching and visual disturbance.   Respiratory: Negative for apnea, cough, chest tightness, shortness of breath, wheezing and stridor.    Cardiovascular: Negative for chest pain and leg swelling.   Gastrointestinal: Negative for abdominal  distention, abdominal pain, anal bleeding, blood in stool, constipation, diarrhea and nausea.   Endocrine: Negative for cold intolerance and heat intolerance.   Genitourinary: Negative for difficulty urinating, dysuria and genital sores.   Musculoskeletal: Positive for arthralgias. Negative for back pain, gait problem, joint swelling, myalgias, neck pain and neck stiffness.   Skin: Negative for color change, pallor, rash and wound.   Neurological: Negative for dizziness, seizures, light-headedness, numbness and headaches.   Hematological: Negative for adenopathy. Does not bruise/bleed easily.   Psychiatric/Behavioral: Negative for sleep disturbance. The patient is not nervous/anxious.            Objective:        Physical Exam   Constitutional: She is oriented to person, place, and time.   HENT:   Head: Normocephalic and atraumatic.   Right Ear: External ear normal.   Left Ear: External ear normal.   Nose: Nose normal.   Mouth/Throat: Oropharynx is clear and moist. No oropharyngeal exudate.   Eyes: Conjunctivae and EOM are normal. Pupils are equal, round, and reactive to light. Right eye exhibits no discharge. Left eye exhibits no discharge. No scleral icterus.   Neck: No JVD present. No thyromegaly present.   Cardiovascular: Normal rate, regular rhythm, normal heart sounds and intact distal pulses.  Exam reveals no gallop and no friction rub.    No murmur heard.  Pulmonary/Chest: Effort normal and breath sounds normal. No respiratory distress. She has no wheezes. She has no rales. She exhibits no tenderness.   Abdominal: Soft. Bowel sounds are normal. She exhibits no distension and no mass. There is no abdominal tenderness. There is no rebound and no guarding.   Lymphadenopathy:     She has no cervical adenopathy.   Neurological: She is alert and oriented to person, place, and time. No cranial nerve deficit. Gait normal. Coordination normal.   Skin: Skin is dry. No rash noted. No erythema. No pallor.      Psychiatric: Affect and judgment normal.   Musculoskeletal: Deformity present. No tenderness or edema.           Assessment:     61 year old F with PMH of RA (around age 57), osteoporosis, palpitations here for evaluation.   She is on MTX 3 pills once a week and enbrel and is doing well.    Plan:       Problem List Items Addressed This Visit    None       Labs every 3 months  Continue MTX 3 pills once week (Risks and benefits of initiating MTX discussed. Patient aware that risks include and not limited to lung toxicity, liver abnormalities, cell count abnormalities, malignancy, and allergic  reaction to medication. Patient agrees with starting medication.  Continue folic acid 1 mg po qday  30 * minutes of total time spent on the encounter, which includes face to face time and non-face to face time preparing to see the patient (eg, review of tests), Obtaining and/or reviewing separately obtained history, Documenting clinical information in the electronic or other health record, Independently interpreting results (not separately reported) and communicating results to the patient/family/caregiver, or Care coordination (not separately reported).         *  30 * minutes of total time spent on the encounter, which includes face to face time and non-face to face time preparing to see the patient (eg, review of tests), Obtaining and/or reviewing separately obtained history, Documenting clinical information in the electronic or other health record, Independently interpreting results (not separately reported) and communicating results to the patient/family/caregiver, or Care coordination (not separately reported).

## 2023-01-06 ENCOUNTER — PATIENT MESSAGE (OUTPATIENT)
Dept: ALLERGY | Facility: CLINIC | Age: 63
End: 2023-01-06
Payer: MEDICARE

## 2023-01-30 ENCOUNTER — PATIENT MESSAGE (OUTPATIENT)
Dept: PHARMACY | Facility: CLINIC | Age: 63
End: 2023-01-30
Payer: MEDICARE

## 2023-01-30 ENCOUNTER — SPECIALTY PHARMACY (OUTPATIENT)
Dept: PHARMACY | Facility: CLINIC | Age: 63
End: 2023-01-30
Payer: MEDICARE

## 2023-01-30 NOTE — TELEPHONE ENCOUNTER
Specialty Pharmacy - Refill Coordination    Specialty Medication Orders Linked to Encounter      Flowsheet Row Most Recent Value   Medication #1 etanercept (ENBREL SURECLICK) 50 mg/mL (1 mL) (Order#[id number], Rx#[id number])            Refill Questions - Documented Responses      Flowsheet Row Most Recent Value   Patient Availability and HIPAA Verification    Does patient want to proceed with activity? Yes   HIPAA/medical authority confirmed? Yes   Relationship to patient of person spoken to? Self   Refill Screening Questions    Changes to allergies? No   Changes to medications? No   New conditions since last clinic visit? No   Unplanned office visit, urgent care, ED, or hospital admission in the last 4 weeks? No   How does patient/caregiver feel medication is working? Good   Financial problems or insurance changes? No   How many doses of your specialty medications were missed in the last 4 weeks? 0   Would patient like to speak to a pharmacist? No   When does the patient need to receive the medication? 02/08/23   Refill Delivery Questions    How will the patient receive the medication? MED[facility]   When does the patient need to receive the medication? 02/08/23   Shipping Address Home   Address in [facility] confirmed and updated if neccessary? Yes   Expected Copay ($) 10.35   Is the patient able to afford the medication copay? Yes   Payment Method CC on file   Days supply of Refill 28   Supplies needed? No supplies needed   Refill activity completed? Yes   Refill activity plan Refill scheduled   Shipment/Pickup Date: 02/01/23            Current Outpatient Medications   Medication Sig    ascorbic acid, vitamin C, 1,000 mg TbSR Take 1 tablet by mouth.    atorvastatin (LIPITOR) 20 MG tablet Take 20 mg by mouth.    biotin 10,000 mcg Cap Take 1 capsule by mouth.    calcium carbonate (OS-ALIYAH) 600 mg calcium (1,500 mg) Tab Take 1,200 mg by mouth once daily at 6am.    cetirizine (ZYRTEC) 10 MG tablet Take 10 mg by mouth  once daily.    cholecalciferol, vitamin D3, (VITAMIN D3) 50 mcg (2,000 unit) Cap Take 1 tablet by mouth.    denosumab (PROLIA) 60 mg/mL Syrg Inject 60 mg into the skin.    etanercept (ENBREL SURECLICK) 50 mg/mL (1 mL) Inject 1 mL (50 mg total) into the skin every 7 days.    ferrous fumarate/vit Bcomp,C (SUPER B COMPLEX ORAL) Take 1 tablet by mouth.    FLAXSEED ORAL 1 (one) time each day    FLOWFLEX COVID-19 AG HOME TEST Kit USE AS DIRECTED ON PACKAGE INSERT.    fluticasone propionate (FLONASE) 50 mcg/actuation nasal spray 2 sprays (100 mcg total) by Each Nostril route once daily.    folic acid (FOLVITE) 1 MG tablet Take 1 tablet (1 mg total) by mouth once daily.    glucosamine HCl 1,500 mg Tab Take 1 tablet by mouth.    INOSITOL ORAL Take by mouth.    Lactobac no.41/Bifidobact no.7 (PROBIOTIC-10 ORAL) Take 1 capsule by mouth.    lisinopriL (PRINIVIL,ZESTRIL) 5 MG tablet Take 5 mg by mouth.    melatonin 5 mg Cap Take 1 capsule by mouth every evening.    methotrexate 2.5 MG Tab TAKE 3 TABLETS (7.5 MG TOTAL) BY MOUTH EVERY 7 DAYS.    metoprolol tartrate (LOPRESSOR) 25 MG tablet Take 1 tablet by mouth nightly.    montelukast (SINGULAIR) 10 mg tablet TAKE 1 TABLET (10 MG TOTAL) BY MOUTH EVERY EVENING.    multivitamin (THERAGRAN) per tablet Take 1 capsule by mouth.    omega-3 fatty acids/fish oil (FISH OIL-OMEGA-3 FATTY ACIDS) 300-1,000 mg capsule Take 1 capsule by mouth.    ondansetron (ZOFRAN-ODT) 8 MG TbDL DISSOLVE 1 TABLET BY MOUTH EVERY EIGHT HOURS  AS NEEDED FOR NAUSEA AND VOMOTING    traZODone (DESYREL) 50 MG tablet Take 1.5 tablets (75 mg total) by mouth every evening.   Last reviewed on 1/5/2023  4:24 PM by Meenu Piedra MA    Review of patient's allergies indicates:  No Known Allergies Last reviewed on  1/5/2023 4:22 PM by Gama Piedra      Tasks added this encounter   3/1/2023 - Refill Call (Auto Added)   Tasks due within next 3 months   No tasks due.     Julianne Sanon, PharmD  [facility]  [address] [address]  Phone: [phone]  Fax: [phone]

## 2023-02-07 DIAGNOSIS — Z00.00 ENCOUNTER FOR MEDICARE ANNUAL WELLNESS EXAM: ICD-10-CM

## 2023-02-09 DIAGNOSIS — Z00.00 ENCOUNTER FOR MEDICARE ANNUAL WELLNESS EXAM: ICD-10-CM

## 2023-02-15 LAB — PAP RECOMMENDATION EXT: NORMAL

## 2023-02-23 LAB — BCS RECOMMENDATION EXT: NORMAL

## 2023-02-28 ENCOUNTER — PATIENT OUTREACH (OUTPATIENT)
Dept: ADMINISTRATIVE | Facility: HOSPITAL | Age: 63
End: 2023-02-28
Payer: MEDICARE

## 2023-02-28 ENCOUNTER — PATIENT MESSAGE (OUTPATIENT)
Dept: ADMINISTRATIVE | Facility: HOSPITAL | Age: 63
End: 2023-02-28
Payer: MEDICARE

## 2023-03-06 ENCOUNTER — PATIENT MESSAGE (OUTPATIENT)
Dept: PHARMACY | Facility: CLINIC | Age: 63
End: 2023-03-06
Payer: MEDICARE

## 2023-03-07 ENCOUNTER — SPECIALTY PHARMACY (OUTPATIENT)
Dept: PHARMACY | Facility: CLINIC | Age: 63
End: 2023-03-07
Payer: MEDICARE

## 2023-03-07 NOTE — TELEPHONE ENCOUNTER
Specialty Pharmacy - Refill Coordination    Specialty Medication Orders Linked to Encounter      Flowsheet Row Most Recent Value   Medication #1 etanercept (ENBREL SURECLICK) 50 mg/mL (1 mL) (Order#[id number], Rx#[id number])            Refill Questions - Documented Responses      Flowsheet Row Most Recent Value   Patient Availability and HIPAA Verification    Does patient want to proceed with activity? Yes   HIPAA/medical authority confirmed? Yes   Relationship to patient of person spoken to? Self   Refill Screening Questions    Changes to allergies? No   Changes to medications? No   New conditions since last clinic visit? No   Unplanned office visit, urgent care, ED, or hospital admission in the last 4 weeks? No   How does patient/caregiver feel medication is working? Good   Financial problems or insurance changes? No   How many doses of your specialty medications were missed in the last 4 weeks? 0   Would patient like to speak to a pharmacist? No   When does the patient need to receive the medication? 03/15/23   Refill Delivery Questions    How will the patient receive the medication? MEDRx   When does the patient need to receive the medication? 03/15/23   Shipping Address Home   Address in [facility] confirmed and updated if neccessary? Yes   Expected Copay ($) 10.35   Is the patient able to afford the medication copay? Yes   Payment Method CC on file   Days supply of Refill 28   Supplies needed? No supplies needed   Refill activity completed? Yes   Refill activity plan Refill scheduled   Shipment/Pickup Date: 03/08/23            Current Outpatient Medications   Medication Sig    ascorbic acid, vitamin C, 1,000 mg TbSR Take 1 tablet by mouth.    atorvastatin (LIPITOR) 20 MG tablet Take 20 mg by mouth.    biotin 10,000 mcg Cap Take 1 capsule by mouth.    calcium carbonate (OS-ALIYAH) 600 mg calcium (1,500 mg) Tab Take 1,200 mg by mouth once daily at 6am.    cetirizine (ZYRTEC) 10 MG tablet Take 10 mg by mouth  once daily.    cholecalciferol, vitamin D3, (VITAMIN D3) 50 mcg (2,000 unit) Cap Take 1 tablet by mouth.    denosumab (PROLIA) 60 mg/mL Syrg Inject 60 mg into the skin.    etanercept (ENBREL SURECLICK) 50 mg/mL (1 mL) Inject 1 mL (50 mg total) into the skin every 7 days.    ferrous fumarate/vit Bcomp,C (SUPER B COMPLEX ORAL) Take 1 tablet by mouth.    FLAXSEED ORAL 1 (one) time each day    FLOWFLEX COVID-19 AG HOME TEST Kit USE AS DIRECTED ON PACKAGE INSERT.    fluticasone propionate (FLONASE) 50 mcg/actuation nasal spray 2 sprays (100 mcg total) by Each Nostril route once daily.    folic acid (FOLVITE) 1 MG tablet Take 1 tablet (1 mg total) by mouth once daily.    glucosamine HCl 1,500 mg Tab Take 1 tablet by mouth.    INOSITOL ORAL Take by mouth.    Lactobac no.41/Bifidobact no.7 (PROBIOTIC-10 ORAL) Take 1 capsule by mouth.    lisinopriL (PRINIVIL,ZESTRIL) 5 MG tablet Take 5 mg by mouth.    melatonin 5 mg Cap Take 1 capsule by mouth every evening.    methotrexate 2.5 MG Tab TAKE 3 TABLETS (7.5 MG TOTAL) BY MOUTH EVERY 7 DAYS.    metoprolol tartrate (LOPRESSOR) 25 MG tablet Take 1 tablet by mouth nightly.    multivitamin (THERAGRAN) per tablet Take 1 capsule by mouth.    omega-3 fatty acids/fish oil (FISH OIL-OMEGA-3 FATTY ACIDS) 300-1,000 mg capsule Take 1 capsule by mouth.    ondansetron (ZOFRAN-ODT) 8 MG TbDL DISSOLVE 1 TABLET BY MOUTH EVERY EIGHT HOURS  AS NEEDED FOR NAUSEA AND VOMOTING    traZODone (DESYREL) 50 MG tablet Take 1.5 tablets (75 mg total) by mouth every evening.   Last reviewed on 1/5/2023  4:24 PM by Meenu Piedra MA    Review of patient's allergies indicates:  No Known Allergies Last reviewed on  1/5/2023 4:22 PM by Gama Piedra      Tasks added this encounter   4/5/2023 - Refill Call (Auto Added)   Tasks due within next 3 months   No tasks due.     Nicole Hardy, PharmD  [facility]  [address] [address]  Phone: [phone]  Fax:  [phone]

## 2023-03-08 LAB
ALBUMIN SERPL-MCNC: 4.5 G/DL (ref 3.6–5.1)
ALBUMIN/GLOB SERPL: 1.7 (CALC) (ref 1–2.5)
ALP SERPL-CCNC: 52 U/L (ref 37–153)
ALT SERPL-CCNC: 19 U/L (ref 6–29)
AST SERPL-CCNC: 20 U/L (ref 10–35)
BASOPHILS # BLD AUTO: 51 CELLS/UL (ref 0–200)
BASOPHILS NFR BLD AUTO: 0.6 %
BILIRUB SERPL-MCNC: 0.5 MG/DL (ref 0.2–1.2)
BUN SERPL-MCNC: 15 MG/DL (ref 7–25)
BUN/CREAT SERPL: NORMAL (CALC) (ref 6–22)
CALCIUM SERPL-MCNC: 9.7 MG/DL (ref 8.6–10.4)
CHLORIDE SERPL-SCNC: 103 MMOL/L (ref 98–110)
CO2 SERPL-SCNC: 28 MMOL/L (ref 20–32)
CREAT SERPL-MCNC: 0.68 MG/DL (ref 0.5–1.05)
CRP SERPL-MCNC: 0.4 MG/L
EGFR: 97 ML/MIN/1.73M2
EOSINOPHIL # BLD AUTO: 136 CELLS/UL (ref 15–500)
EOSINOPHIL NFR BLD AUTO: 1.6 %
ERYTHROCYTE [DISTWIDTH] IN BLOOD BY AUTOMATED COUNT: 14.1 % (ref 11–15)
ERYTHROCYTE [SEDIMENTATION RATE] IN BLOOD BY WESTERGREN METHOD: 6 MM/H
GLOBULIN SER CALC-MCNC: 2.7 G/DL (CALC) (ref 1.9–3.7)
GLUCOSE SERPL-MCNC: 99 MG/DL (ref 65–99)
HCT VFR BLD AUTO: 39.2 % (ref 35–45)
HGB BLD-MCNC: 13.3 G/DL (ref 11.7–15.5)
LYMPHOCYTES # BLD AUTO: 2100 CELLS/UL (ref 850–3900)
LYMPHOCYTES NFR BLD AUTO: 24.7 %
MCH RBC QN AUTO: 31.1 PG (ref 27–33)
MCHC RBC AUTO-ENTMCNC: 33.9 G/DL (ref 32–36)
MCV RBC AUTO: 91.8 FL (ref 80–100)
MONOCYTES # BLD AUTO: 680 CELLS/UL (ref 200–950)
MONOCYTES NFR BLD AUTO: 8 %
NEUTROPHILS # BLD AUTO: 5534 CELLS/UL (ref 1500–7800)
NEUTROPHILS NFR BLD AUTO: 65.1 %
PLATELET # BLD AUTO: 254 THOUSAND/UL (ref 140–400)
PMV BLD REES-ECKER: 10.6 FL (ref 7.5–12.5)
POTASSIUM SERPL-SCNC: 4 MMOL/L (ref 3.5–5.3)
PROT SERPL-MCNC: 7.2 G/DL (ref 6.1–8.1)
RBC # BLD AUTO: 4.27 MILLION/UL (ref 3.8–5.1)
SODIUM SERPL-SCNC: 137 MMOL/L (ref 135–146)
WBC # BLD AUTO: 8.5 THOUSAND/UL (ref 3.8–10.8)

## 2023-03-10 ENCOUNTER — LAB VISIT (OUTPATIENT)
Dept: LAB | Facility: HOSPITAL | Age: 63
End: 2023-03-10
Attending: INTERNAL MEDICINE
Payer: MEDICARE

## 2023-03-10 DIAGNOSIS — I10 ESSENTIAL HYPERTENSION: ICD-10-CM

## 2023-03-10 DIAGNOSIS — M06.9 RHEUMATOID ARTHRITIS, INVOLVING UNSPECIFIED SITE, UNSPECIFIED WHETHER RHEUMATOID FACTOR PRESENT: ICD-10-CM

## 2023-03-10 DIAGNOSIS — R73.9 HYPERGLYCEMIA: ICD-10-CM

## 2023-03-10 LAB
ALBUMIN SERPL BCP-MCNC: 4.3 G/DL (ref 3.5–5.2)
ALP SERPL-CCNC: 59 U/L (ref 55–135)
ALT SERPL W/O P-5'-P-CCNC: 27 U/L (ref 10–44)
ANION GAP SERPL CALC-SCNC: 7 MMOL/L (ref 8–16)
AST SERPL-CCNC: 25 U/L (ref 10–40)
BASOPHILS # BLD AUTO: 0.04 K/UL (ref 0–0.2)
BASOPHILS NFR BLD: 0.5 % (ref 0–1.9)
BILIRUB SERPL-MCNC: 0.5 MG/DL (ref 0.1–1)
BUN SERPL-MCNC: 15 MG/DL (ref 8–23)
CALCIUM SERPL-MCNC: 9.9 MG/DL (ref 8.7–10.5)
CHLORIDE SERPL-SCNC: 107 MMOL/L (ref 95–110)
CHOLEST SERPL-MCNC: 173 MG/DL (ref 120–199)
CHOLEST/HDLC SERPL: 2.5 {RATIO} (ref 2–5)
CO2 SERPL-SCNC: 25 MMOL/L (ref 23–29)
CREAT SERPL-MCNC: 0.8 MG/DL (ref 0.5–1.4)
DIFFERENTIAL METHOD: NORMAL
EOSINOPHIL # BLD AUTO: 0.2 K/UL (ref 0–0.5)
EOSINOPHIL NFR BLD: 2.5 % (ref 0–8)
ERYTHROCYTE [DISTWIDTH] IN BLOOD BY AUTOMATED COUNT: 14.4 % (ref 11.5–14.5)
EST. GFR  (NO RACE VARIABLE): >60 ML/MIN/1.73 M^2
ESTIMATED AVG GLUCOSE: 105 MG/DL (ref 68–131)
GLUCOSE SERPL-MCNC: 100 MG/DL (ref 70–110)
HBA1C MFR BLD: 5.3 % (ref 4–5.6)
HCT VFR BLD AUTO: 39.7 % (ref 37–48.5)
HDLC SERPL-MCNC: 69 MG/DL (ref 40–75)
HDLC SERPL: 39.9 % (ref 20–50)
HGB BLD-MCNC: 13 G/DL (ref 12–16)
IMM GRANULOCYTES # BLD AUTO: 0.03 K/UL (ref 0–0.04)
IMM GRANULOCYTES NFR BLD AUTO: 0.4 % (ref 0–0.5)
LDLC SERPL CALC-MCNC: 88.2 MG/DL (ref 63–159)
LYMPHOCYTES # BLD AUTO: 2.5 K/UL (ref 1–4.8)
LYMPHOCYTES NFR BLD: 32.6 % (ref 18–48)
MCH RBC QN AUTO: 30.7 PG (ref 27–31)
MCHC RBC AUTO-ENTMCNC: 32.7 G/DL (ref 32–36)
MCV RBC AUTO: 94 FL (ref 82–98)
MONOCYTES # BLD AUTO: 0.6 K/UL (ref 0.3–1)
MONOCYTES NFR BLD: 8 % (ref 4–15)
NEUTROPHILS # BLD AUTO: 4.3 K/UL (ref 1.8–7.7)
NEUTROPHILS NFR BLD: 56 % (ref 38–73)
NONHDLC SERPL-MCNC: 104 MG/DL
NRBC BLD-RTO: 0 /100 WBC
PLATELET # BLD AUTO: 242 K/UL (ref 150–450)
PMV BLD AUTO: 10.5 FL (ref 9.2–12.9)
POTASSIUM SERPL-SCNC: 4.5 MMOL/L (ref 3.5–5.1)
PROT SERPL-MCNC: 7.8 G/DL (ref 6–8.4)
RBC # BLD AUTO: 4.24 M/UL (ref 4–5.4)
SODIUM SERPL-SCNC: 139 MMOL/L (ref 136–145)
TRIGL SERPL-MCNC: 79 MG/DL (ref 30–150)
TSH SERPL DL<=0.005 MIU/L-ACNC: 3.06 UIU/ML (ref 0.4–4)
WBC # BLD AUTO: 7.73 K/UL (ref 3.9–12.7)

## 2023-03-10 PROCEDURE — 36415 COLL VENOUS BLD VENIPUNCTURE: CPT | Mod: HCNC | Performed by: INTERNAL MEDICINE

## 2023-03-10 PROCEDURE — 80053 COMPREHEN METABOLIC PANEL: CPT | Mod: HCNC | Performed by: INTERNAL MEDICINE

## 2023-03-10 PROCEDURE — 85025 COMPLETE CBC W/AUTO DIFF WBC: CPT | Mod: HCNC | Performed by: INTERNAL MEDICINE

## 2023-03-10 PROCEDURE — 83036 HEMOGLOBIN GLYCOSYLATED A1C: CPT | Mod: HCNC | Performed by: INTERNAL MEDICINE

## 2023-03-10 PROCEDURE — 80061 LIPID PANEL: CPT | Mod: HCNC | Performed by: INTERNAL MEDICINE

## 2023-03-10 PROCEDURE — 84443 ASSAY THYROID STIM HORMONE: CPT | Mod: HCNC | Performed by: INTERNAL MEDICINE

## 2023-03-12 ENCOUNTER — PATIENT MESSAGE (OUTPATIENT)
Dept: RHEUMATOLOGY | Facility: CLINIC | Age: 63
End: 2023-03-12
Payer: MEDICARE

## 2023-03-13 DIAGNOSIS — M23.92 KNEE LOCKING, LEFT: Primary | ICD-10-CM

## 2023-03-13 DIAGNOSIS — M25.532 LEFT WRIST PAIN: ICD-10-CM

## 2023-03-14 ENCOUNTER — OFFICE VISIT (OUTPATIENT)
Dept: FAMILY MEDICINE | Facility: CLINIC | Age: 63
End: 2023-03-14
Payer: MEDICARE

## 2023-03-14 VITALS
DIASTOLIC BLOOD PRESSURE: 84 MMHG | OXYGEN SATURATION: 98 % | WEIGHT: 155 LBS | HEART RATE: 72 BPM | HEIGHT: 64 IN | SYSTOLIC BLOOD PRESSURE: 128 MMHG | BODY MASS INDEX: 26.46 KG/M2

## 2023-03-14 DIAGNOSIS — M81.0 OSTEOPOROSIS WITHOUT CURRENT PATHOLOGICAL FRACTURE, UNSPECIFIED OSTEOPOROSIS TYPE: ICD-10-CM

## 2023-03-14 DIAGNOSIS — M25.532 LEFT WRIST PAIN: ICD-10-CM

## 2023-03-14 DIAGNOSIS — E78.00 PURE HYPERCHOLESTEROLEMIA: ICD-10-CM

## 2023-03-14 DIAGNOSIS — M25.562 ACUTE PAIN OF LEFT KNEE: ICD-10-CM

## 2023-03-14 DIAGNOSIS — J30.89 ENVIRONMENTAL AND SEASONAL ALLERGIES: ICD-10-CM

## 2023-03-14 DIAGNOSIS — I10 ESSENTIAL HYPERTENSION: ICD-10-CM

## 2023-03-14 DIAGNOSIS — Z00.00 ANNUAL PHYSICAL EXAM: Primary | ICD-10-CM

## 2023-03-14 DIAGNOSIS — M06.9 RHEUMATOID ARTHRITIS, INVOLVING UNSPECIFIED SITE, UNSPECIFIED WHETHER RHEUMATOID FACTOR PRESENT: ICD-10-CM

## 2023-03-14 DIAGNOSIS — F51.01 PRIMARY INSOMNIA: ICD-10-CM

## 2023-03-14 PROCEDURE — 99999 PR PBB SHADOW E&M-EST. PATIENT-LVL V: CPT | Mod: PBBFAC,HCNC,, | Performed by: INTERNAL MEDICINE

## 2023-03-14 PROCEDURE — 1159F MED LIST DOCD IN RCRD: CPT | Mod: HCNC,CPTII,S$GLB, | Performed by: INTERNAL MEDICINE

## 2023-03-14 PROCEDURE — 99999 PR PBB SHADOW E&M-EST. PATIENT-LVL V: ICD-10-PCS | Mod: PBBFAC,HCNC,, | Performed by: INTERNAL MEDICINE

## 2023-03-14 PROCEDURE — 3044F HG A1C LEVEL LT 7.0%: CPT | Mod: HCNC,CPTII,S$GLB, | Performed by: INTERNAL MEDICINE

## 2023-03-14 PROCEDURE — 1160F RVW MEDS BY RX/DR IN RCRD: CPT | Mod: HCNC,CPTII,S$GLB, | Performed by: INTERNAL MEDICINE

## 2023-03-14 PROCEDURE — 1159F PR MEDICATION LIST DOCUMENTED IN MEDICAL RECORD: ICD-10-PCS | Mod: HCNC,CPTII,S$GLB, | Performed by: INTERNAL MEDICINE

## 2023-03-14 PROCEDURE — 3074F SYST BP LT 130 MM HG: CPT | Mod: HCNC,CPTII,S$GLB, | Performed by: INTERNAL MEDICINE

## 2023-03-14 PROCEDURE — 99396 PREV VISIT EST AGE 40-64: CPT | Mod: HCNC,S$GLB,, | Performed by: INTERNAL MEDICINE

## 2023-03-14 PROCEDURE — 3074F PR MOST RECENT SYSTOLIC BLOOD PRESSURE < 130 MM HG: ICD-10-PCS | Mod: HCNC,CPTII,S$GLB, | Performed by: INTERNAL MEDICINE

## 2023-03-14 PROCEDURE — 3008F PR BODY MASS INDEX (BMI) DOCUMENTED: ICD-10-PCS | Mod: HCNC,CPTII,S$GLB, | Performed by: INTERNAL MEDICINE

## 2023-03-14 PROCEDURE — 4010F PR ACE/ARB THEARPY RXD/TAKEN: ICD-10-PCS | Mod: HCNC,CPTII,S$GLB, | Performed by: INTERNAL MEDICINE

## 2023-03-14 PROCEDURE — 3079F DIAST BP 80-89 MM HG: CPT | Mod: HCNC,CPTII,S$GLB, | Performed by: INTERNAL MEDICINE

## 2023-03-14 PROCEDURE — 3079F PR MOST RECENT DIASTOLIC BLOOD PRESSURE 80-89 MM HG: ICD-10-PCS | Mod: HCNC,CPTII,S$GLB, | Performed by: INTERNAL MEDICINE

## 2023-03-14 PROCEDURE — 3008F BODY MASS INDEX DOCD: CPT | Mod: HCNC,CPTII,S$GLB, | Performed by: INTERNAL MEDICINE

## 2023-03-14 PROCEDURE — 1160F PR REVIEW ALL MEDS BY PRESCRIBER/CLIN PHARMACIST DOCUMENTED: ICD-10-PCS | Mod: HCNC,CPTII,S$GLB, | Performed by: INTERNAL MEDICINE

## 2023-03-14 PROCEDURE — 3044F PR MOST RECENT HEMOGLOBIN A1C LEVEL <7.0%: ICD-10-PCS | Mod: HCNC,CPTII,S$GLB, | Performed by: INTERNAL MEDICINE

## 2023-03-14 PROCEDURE — 4010F ACE/ARB THERAPY RXD/TAKEN: CPT | Mod: HCNC,CPTII,S$GLB, | Performed by: INTERNAL MEDICINE

## 2023-03-14 PROCEDURE — 99396 PR PREVENTIVE VISIT,EST,40-64: ICD-10-PCS | Mod: HCNC,S$GLB,, | Performed by: INTERNAL MEDICINE

## 2023-03-14 RX ORDER — ATORVASTATIN CALCIUM 20 MG/1
20 TABLET, FILM COATED ORAL NIGHTLY
Qty: 90 TABLET | Refills: 3 | Status: SHIPPED | OUTPATIENT
Start: 2023-03-14

## 2023-03-14 RX ORDER — LISINOPRIL 5 MG/1
5 TABLET ORAL DAILY
Qty: 90 TABLET | Refills: 3 | Status: SHIPPED | OUTPATIENT
Start: 2023-03-14

## 2023-03-14 RX ORDER — METOPROLOL TARTRATE 25 MG/1
25 TABLET, FILM COATED ORAL NIGHTLY
Qty: 90 TABLET | Refills: 3 | Status: SHIPPED | OUTPATIENT
Start: 2023-03-14

## 2023-03-14 NOTE — Clinical Note
Good morning, just to vaginal the patient had a mammogram with SHELLY in February and had Pap smear with Dr. Ajit Soto in February also. TY

## 2023-03-14 NOTE — PROGRESS NOTES
Subjective:       Patient ID: Earnestine Petit is a 62 y.o. female.    Chief Complaint: Follow-up (6 month )      HPI  Earnestine Petit is a 62 y.o. female with chronic conditions of  rheumatoid arthritis,hypertension, hyperlipidemia, osteoarthritis, insomnia, anxiety, osteoporosis who presents today for routine medical evaluation.    Patient reports has been having pain on her left wrist with sensation of locking.  Is on an off and feels she needs to help her bones get back in place.  A similar situation happens to her left knee when she crosses her legs bringing food under her thigh.  Feels sick lots and needs to slowly bring her leg to a stretch position and also has the sensation of instability. There is no swelling or recent flare-up of her rheumatoid arthritis.  No complains of weakness.  No history of injuries.    Another concern is that she has been craving sweets, especially at night.  Has tried reducing the amount to just a small piece but has a hard time stopping and substituting for other healthier snacks did not satisfy her.  No polydipsia, polyuria, or significant changes in weight.  Not regularly exercising but she is active taking care of grandkids.    Has history of insomnia and trazodone seems to be helping.    She was seen by allergist and found to be allergic to pet dander. Flonase and Zyrtec have helped her allergies significantly.    Labs show normal blood cell count, kidney and liver function, fasting blood glucose and A1c, thyroid function, and electrolytes.  Lipid profile looks good.  Recent sed rate was normal.    Has no toxic habits.  She is .    Up-to-date with mammogram and would Pap smears.    Due for a pneumonia shot.    Medications reviewed, taking supplements that may not be necessary, or may not need to take every day as long she has good nutrition..    Health Maintenance:  Health Maintenance   Topic Date Due    Hepatitis C Screening  Never done    Mammogram  02/22/2023    TETANUS  VACCINE  05/02/2027    Lipid Panel  03/10/2028       Review of Systems   Constitutional:  Positive for appetite change (craving sweets at night). Negative for fatigue, fever and unexpected weight change.   HENT: Negative.     Respiratory: Negative.     Cardiovascular: Negative.    Gastrointestinal: Negative.    Endocrine: Negative for polydipsia, polyphagia and polyuria.   Genitourinary:  Negative for difficulty urinating.   Musculoskeletal:  Positive for arthralgias (Left wrist and left knee) and leg pain (left knee when crossing legs).   Integumentary:  Negative.   Neurological: Negative.  Negative for weakness.   Psychiatric/Behavioral:  Positive for sleep disturbance (trazodone helps). The patient is nervous/anxious (tends to worry and keeps her up.  Is not constant).     Past Medical History:   Diagnosis Date    Allergy     Arthritis     RH    Hyperlipidemia     Hypertension     Osteoporosis     Palpitations        Past Surgical History:   Procedure Laterality Date    ADENOIDECTOMY      TONSILLECTOMY         Family History   Problem Relation Age of Onset    Diabetes Mother     Cancer Father         Stomach or pancreas    Heart disease Father     Hyperlipidemia Sister     Hypertension Sister     Heart disease Sister     Rheum arthritis Sister     Cancer Brother     Diabetes Maternal Grandmother     Heart disease Maternal Grandfather     Allergies Daughter     Asthma Neg Hx        Social History     Socioeconomic History    Marital status:    Tobacco Use    Smoking status: Never     Passive exposure: Never    Smokeless tobacco: Never   Substance and Sexual Activity    Alcohol use: Yes     Alcohol/week: 7.0 standard drinks     Types: 7 Glasses of wine per week     Comment: A glass of wine daily       Current Outpatient Medications   Medication Sig Dispense Refill    biotin 10,000 mcg Cap Take 1 capsule by mouth.      calcium carbonate (OS-ALIYAH) 600 mg calcium (1,500 mg) Tab Take 1,200 mg by mouth once daily  at 6am.      cetirizine (ZYRTEC) 10 MG tablet Take 10 mg by mouth once daily.      cholecalciferol, vitamin D3, (VITAMIN D3) 50 mcg (2,000 unit) Cap Take 1 tablet by mouth.      denosumab (PROLIA) 60 mg/mL Syrg Inject 60 mg into the skin.      etanercept (ENBREL SURECLICK) 50 mg/mL (1 mL) Inject 1 mL (50 mg total) into the skin every 7 days. 4 mL 11    ferrous fumarate/vit Bcomp,C (SUPER B COMPLEX ORAL) Take 1 tablet by mouth.      FLOWFLEX COVID-19 AG HOME TEST Kit USE AS DIRECTED ON PACKAGE INSERT.      fluticasone propionate (FLONASE) 50 mcg/actuation nasal spray 2 sprays (100 mcg total) by Each Nostril route once daily. 16 g 12    folic acid (FOLVITE) 1 MG tablet Take 1 tablet (1 mg total) by mouth once daily. 90 tablet 3    glucosamine HCl 1,500 mg Tab Take 1 tablet by mouth.      Lactobac no.41/Bifidobact no.7 (PROBIOTIC-10 ORAL) Take 1 capsule by mouth.      melatonin 5 mg Cap Take 1 capsule by mouth every evening.      methotrexate 2.5 MG Tab TAKE 3 TABLETS (7.5 MG TOTAL) BY MOUTH EVERY 7 DAYS. 12 tablet 1    ondansetron (ZOFRAN-ODT) 8 MG TbDL DISSOLVE 1 TABLET BY MOUTH EVERY EIGHT HOURS  AS NEEDED FOR NAUSEA AND VOMOTING      traZODone (DESYREL) 50 MG tablet Take 1.5 tablets (75 mg total) by mouth every evening. 135 tablet 3    atorvastatin (LIPITOR) 20 MG tablet Take 1 tablet (20 mg total) by mouth every evening. 90 tablet 3    lisinopriL (PRINIVIL,ZESTRIL) 5 MG tablet Take 1 tablet (5 mg total) by mouth once daily. 90 tablet 3    metoprolol tartrate (LOPRESSOR) 25 MG tablet Take 1 tablet (25 mg total) by mouth nightly. 90 tablet 3     No current facility-administered medications for this visit.       Review of patient's allergies indicates:  No Known Allergies      Objective:       Last 3 sets of Vitals    Vitals - 1 value per visit 1/5/2023 3/14/2023 3/14/2023   SYSTOLIC 142 - 128   DIASTOLIC 88 - 84   Pulse 70 - 72   Temp - - -   Resp - - -   SPO2 - - 98   Weight (lb) 151.9 - 154.98   Weight (kg) 68.9 -  70.3   Height 64.02 - 64   BMI (Calculated) 26.1 - 26.6   VISIT REPORT - - -   Pain Score  - 0 -   Physical Exam  Constitutional:       General: She is not in acute distress.  HENT:      Head: Normocephalic.      Right Ear: Tympanic membrane, ear canal and external ear normal.      Left Ear: Tympanic membrane, ear canal and external ear normal.      Nose: Nose normal.      Mouth/Throat:      Mouth: Mucous membranes are moist.   Eyes:      General: No scleral icterus.     Extraocular Movements: Extraocular movements intact.      Conjunctiva/sclera: Conjunctivae normal.   Neck:      Vascular: No carotid bruit.      Comments: No goiter.  Cardiovascular:      Rate and Rhythm: Normal rate and regular rhythm.      Pulses: Normal pulses.      Heart sounds: Normal heart sounds.   Pulmonary:      Effort: Pulmonary effort is normal.      Breath sounds: Normal breath sounds.   Abdominal:      General: Bowel sounds are normal. There is no distension.      Palpations: Abdomen is soft. There is no mass.      Tenderness: There is no abdominal tenderness.   Musculoskeletal:         General: No swelling. Normal range of motion.   Lymphadenopathy:      Cervical: No cervical adenopathy.   Skin:     General: Skin is warm and dry.   Neurological:      General: No focal deficit present.      Mental Status: She is alert and oriented to person, place, and time.      Gait: Gait normal.   Psychiatric:         Mood and Affect: Mood normal.         Behavior: Behavior normal.         CBC:  Recent Labs   Lab 01/03/23  0852 03/07/23  1001 03/10/23  0825   WBC 8.8 8.5 7.73   RBC 4.48 4.27 4.24   Hemoglobin 13.5 13.3 13.0   Hematocrit 42.1 39.2 39.7   Platelets 262 254 242   MCV 94.0 91.8 94   MCH 30.1 31.1 30.7   MCHC 32.1 33.9 32.7     CMP:  Recent Labs   Lab 01/03/23  0852 03/07/23  1001 03/10/23  0825   Glucose 90 99 100   Calcium 9.6 9.7 9.9   Albumin 4.6 4.5 4.3   Total Protein 7.6 7.2 7.8   Sodium 139 137 139   Potassium 4.2 4.0 4.5   CO2 29  28 25   Chloride 105 103 107   BUN 17 15 15   Creatinine 0.76 0.68 0.8   Alkaline Phosphatase  --   --  59   ALT 25 19 27   AST 22 20 25   Total Bilirubin 0.5 0.5 0.5     URINALYSIS:  Recent Labs   Lab 09/13/22  1127   Color, UA Colorless A   Specific Gravity, UA <1.005 A   pH, UA 7.0   Protein, UA Negative   Nitrite, UA Negative   Leukocytes, UA Negative   Urobilinogen, UA Negative      LIPIDS:  Recent Labs   Lab 07/06/22  0917 03/10/23  0825   TSH 3.01 3.056   HDL 55 69   Cholesterol 152 173   Triglycerides 122 79   LDL Cholesterol 77 88.2   HDL/Cholesterol Ratio 2.8 39.9   Non-HDL Cholesterol  --  104   Non HDL Chol. (LDL+VLDL) 97  --    Total Cholesterol/HDL Ratio  --  2.5     TSH:  Recent Labs   Lab 07/06/22  0917 03/10/23  0825   TSH 3.01 3.056       A1C:  Recent Labs   Lab 07/06/22 0917 03/10/23  0825   Hemoglobin A1C 5.4 5.3       Imaging:  XR Arthritis Survey  Narrative: EXAMINATION:  XR ARTHRITIS SURVEY    CLINICAL HISTORY:  r/o erosions;  Pain in unspecified joint    TECHNIQUE:  A lateral flexion view of the cervical spine was performed.  AP standing views of both knees were performed.  AP standing views of both feet and PA views of both hands were performed.    COMPARISON:  None 12/20/2021.    FINDINGS:  Lateral view of the cervical spine show no atlantoaxial subluxation.  Mild DJD and the narrowing of disc spaces between C5 and C7 vertebral segments.  There is a 3-4 mm C5/C6 retrolisthesis.  AP view of the hands and feet and knees shows mild DJD.  Mild hallux valgus.  No fracture or dislocation.  No bone destruction or bone erosions noted  Impression: See above    Electronically signed by: Zak Vásquez MD  Date:    07/05/2022  Time:    12:22      Assessment:       1. Annual physical exam    2. Essential hypertension    3. Left wrist pain    4. Rheumatoid arthritis, involving unspecified site, unspecified whether rheumatoid factor present    5. Acute pain of left knee    6. Pure hypercholesterolemia     7. Primary insomnia    8. Environmental and seasonal allergies    9. Osteoporosis without current pathological fracture, unspecified osteoporosis type              Plan:       1. Annual physical exam   - Stable physical exam, with normal vital signs and healthy weight.  - recommend lifestyle modifications for episodes of craving.  Habit changing may take weeks to adjust.     2. Essential hypertension  -     controlled blood pressure, continue same treatment.  - lisinopriL (PRINIVIL,ZESTRIL) 5 MG tablet; Take 1 tablet (5 mg total) by mouth once daily.  Dispense: 90 tablet; Refill: 3  -     metoprolol tartrate (LOPRESSOR) 25 MG tablet; Take 1 tablet (25 mg total) by mouth nightly.  Dispense: 90 tablet; Refill: 3    3. Left wrist pain  -     suspect changes of degenerative joint disease.  - Ambulatory referral/consult to Hand Surgery; Future; Expected date: 03/21/2023    4. Rheumatoid arthritis, involving unspecified site, unspecified whether rheumatoid factor present   - stable and followed by Rheumatology    5. Acute pain of left knee  -     suspect changes of degenerative joint disease.  - Ambulatory referral/consult to Orthopedics; Future; Expected date: 03/14/2023    6. Pure hypercholesterolemia  -     atorvastatin (LIPITOR) 20 MG tablet; Take 1 tablet (20 mg total) by mouth every evening.  Dispense: 90 tablet; Refill: 3  - stable labs same treatment    7. Primary insomnia   - continue trazodone as needed    8. Environmental and seasonal allergies   - improved, continue Flonase and Zyrtec    9. Osteoporosis without current pathological fracture, unspecified osteoporosis type   - On supplements and Prolia.       Health Maintenance Due   Topic Date Due    Hepatitis C Screening  Never done    Cervical Cancer Screening  Never done    HIV Screening  Never done    COVID-19 Vaccine (5 - Booster for Moderna series) 09/16/2022    Mammogram  02/22/2023            Return to clinic in 6 months.    Vesta Peña MD  [facility]  Disclaimer:  This note has been generated using voice-recognition software. There may be grammatical or spelling errors that have been missed during proof-reading

## 2023-03-15 ENCOUNTER — PATIENT OUTREACH (OUTPATIENT)
Dept: ADMINISTRATIVE | Facility: HOSPITAL | Age: 63
End: 2023-03-15
Payer: MEDICARE

## 2023-03-15 NOTE — LETTER
AUTHORIZATION FOR RELEASE OF   CONFIDENTIAL INFORMATION    Dr. Ajit Soto,    We are seeing Earnestine Petit, date of birth 1960, in the clinic at [facility]. Vesta Peña MD is the patient's PCP. Earnestine Petit has an outstanding lab/procedure at the time we reviewed her chart. In order to help keep her health information updated, she has authorized us to request the following medical record(s):          ( X )  PAP SMEAR                                                  Please fax records to [facility], Elena Rada, MD, [phone]     If you have any questions, please contact Darshana at [phone].           Patient Name: Earnestine Petit  : 1960  Patient Phone #: [phone]

## 2023-03-15 NOTE — PROGRESS NOTES
Care Everywhere updates requested and reviewed.  Immunizations reconciled. Media reports reviewed.  Duplicate HM overrides and  orders removed.  Overdue HM topic chart audit and/or requested.  Overdue lab testing linked to upcoming lab appointments if applies.    HM updated with external Mammogram report.   Requested Pap  records     Health Maintenance Due   Topic Date Due    Hepatitis C Screening  Never done    Cervical Cancer Screening  Never done    HIV Screening  Never done    COVID-19 Vaccine (5 - Booster for Moderna series) 2022

## 2023-03-17 ENCOUNTER — PATIENT OUTREACH (OUTPATIENT)
Dept: ADMINISTRATIVE | Facility: HOSPITAL | Age: 63
End: 2023-03-17
Payer: MEDICARE

## 2023-03-17 ENCOUNTER — TELEPHONE (OUTPATIENT)
Dept: ORTHOPEDICS | Facility: CLINIC | Age: 63
End: 2023-03-17
Payer: MEDICARE

## 2023-03-17 DIAGNOSIS — M25.569 KNEE PAIN, UNSPECIFIED CHRONICITY, UNSPECIFIED LATERALITY: Primary | ICD-10-CM

## 2023-03-21 ENCOUNTER — TELEPHONE (OUTPATIENT)
Dept: FAMILY MEDICINE | Facility: CLINIC | Age: 63
End: 2023-03-21
Payer: MEDICARE

## 2023-03-22 ENCOUNTER — HOSPITAL ENCOUNTER (OUTPATIENT)
Dept: RADIOLOGY | Facility: HOSPITAL | Age: 63
Discharge: HOME | End: 2023-03-22
Attending: PHYSICIAN ASSISTANT
Payer: MEDICARE

## 2023-03-22 ENCOUNTER — OFFICE VISIT (OUTPATIENT)
Dept: ORTHOPEDICS | Facility: CLINIC | Age: 63
End: 2023-03-22
Payer: MEDICARE

## 2023-03-22 VITALS
SYSTOLIC BLOOD PRESSURE: 130 MMHG | BODY MASS INDEX: 26.08 KG/M2 | DIASTOLIC BLOOD PRESSURE: 84 MMHG | HEART RATE: 66 BPM | WEIGHT: 152.75 LBS | HEIGHT: 64 IN

## 2023-03-22 DIAGNOSIS — M76.32 IT BAND SYNDROME, LEFT: ICD-10-CM

## 2023-03-22 DIAGNOSIS — M06.9 RHEUMATOID ARTHRITIS, INVOLVING UNSPECIFIED SITE, UNSPECIFIED WHETHER RHEUMATOID FACTOR PRESENT: ICD-10-CM

## 2023-03-22 DIAGNOSIS — M62.81 QUADRICEPS WEAKNESS: Primary | ICD-10-CM

## 2023-03-22 DIAGNOSIS — M25.569 KNEE PAIN, UNSPECIFIED CHRONICITY, UNSPECIFIED LATERALITY: ICD-10-CM

## 2023-03-22 DIAGNOSIS — M25.562 ACUTE PAIN OF LEFT KNEE: ICD-10-CM

## 2023-03-22 PROCEDURE — 3008F BODY MASS INDEX DOCD: CPT | Mod: HCNC,CPTII,S$GLB, | Performed by: PHYSICIAN ASSISTANT

## 2023-03-22 PROCEDURE — 99999 PR PBB SHADOW E&M-EST. PATIENT-LVL V: CPT | Mod: PBBFAC,HCNC,, | Performed by: PHYSICIAN ASSISTANT

## 2023-03-22 PROCEDURE — 73564 X-RAY EXAM KNEE 4 OR MORE: CPT | Mod: TC,HCNC,PN,LT

## 2023-03-22 PROCEDURE — 73564 X-RAY EXAM KNEE 4 OR MORE: CPT | Mod: 26,HCNC,LT, | Performed by: RADIOLOGY

## 2023-03-22 PROCEDURE — 99999 PR PBB SHADOW E&M-EST. PATIENT-LVL V: ICD-10-PCS | Mod: PBBFAC,HCNC,, | Performed by: PHYSICIAN ASSISTANT

## 2023-03-22 PROCEDURE — 4010F ACE/ARB THERAPY RXD/TAKEN: CPT | Mod: HCNC,CPTII,S$GLB, | Performed by: PHYSICIAN ASSISTANT

## 2023-03-22 PROCEDURE — 3044F PR MOST RECENT HEMOGLOBIN A1C LEVEL <7.0%: ICD-10-PCS | Mod: HCNC,CPTII,S$GLB, | Performed by: PHYSICIAN ASSISTANT

## 2023-03-22 PROCEDURE — 3079F PR MOST RECENT DIASTOLIC BLOOD PRESSURE 80-89 MM HG: ICD-10-PCS | Mod: HCNC,CPTII,S$GLB, | Performed by: PHYSICIAN ASSISTANT

## 2023-03-22 PROCEDURE — 3075F PR MOST RECENT SYSTOLIC BLOOD PRESS GE 130-139MM HG: ICD-10-PCS | Mod: HCNC,CPTII,S$GLB, | Performed by: PHYSICIAN ASSISTANT

## 2023-03-22 PROCEDURE — 99203 OFFICE O/P NEW LOW 30 MIN: CPT | Mod: HCNC,S$GLB,, | Performed by: PHYSICIAN ASSISTANT

## 2023-03-22 PROCEDURE — 99203 PR OFFICE/OUTPT VISIT, NEW, LEVL III, 30-44 MIN: ICD-10-PCS | Mod: HCNC,S$GLB,, | Performed by: PHYSICIAN ASSISTANT

## 2023-03-22 PROCEDURE — 1160F RVW MEDS BY RX/DR IN RCRD: CPT | Mod: HCNC,CPTII,S$GLB, | Performed by: PHYSICIAN ASSISTANT

## 2023-03-22 PROCEDURE — 1159F MED LIST DOCD IN RCRD: CPT | Mod: HCNC,CPTII,S$GLB, | Performed by: PHYSICIAN ASSISTANT

## 2023-03-22 PROCEDURE — 3044F HG A1C LEVEL LT 7.0%: CPT | Mod: HCNC,CPTII,S$GLB, | Performed by: PHYSICIAN ASSISTANT

## 2023-03-22 PROCEDURE — 1160F PR REVIEW ALL MEDS BY PRESCRIBER/CLIN PHARMACIST DOCUMENTED: ICD-10-PCS | Mod: HCNC,CPTII,S$GLB, | Performed by: PHYSICIAN ASSISTANT

## 2023-03-22 PROCEDURE — 1159F PR MEDICATION LIST DOCUMENTED IN MEDICAL RECORD: ICD-10-PCS | Mod: HCNC,CPTII,S$GLB, | Performed by: PHYSICIAN ASSISTANT

## 2023-03-22 PROCEDURE — 3079F DIAST BP 80-89 MM HG: CPT | Mod: HCNC,CPTII,S$GLB, | Performed by: PHYSICIAN ASSISTANT

## 2023-03-22 PROCEDURE — 3008F PR BODY MASS INDEX (BMI) DOCUMENTED: ICD-10-PCS | Mod: HCNC,CPTII,S$GLB, | Performed by: PHYSICIAN ASSISTANT

## 2023-03-22 PROCEDURE — 3075F SYST BP GE 130 - 139MM HG: CPT | Mod: HCNC,CPTII,S$GLB, | Performed by: PHYSICIAN ASSISTANT

## 2023-03-22 PROCEDURE — 4010F PR ACE/ARB THEARPY RXD/TAKEN: ICD-10-PCS | Mod: HCNC,CPTII,S$GLB, | Performed by: PHYSICIAN ASSISTANT

## 2023-03-22 PROCEDURE — 73564 XR KNEE COMP 4 OR MORE VIEWS LEFT: ICD-10-PCS | Mod: 26,HCNC,LT, | Performed by: RADIOLOGY

## 2023-03-22 RX ORDER — LATANOPROST 50 UG/ML
SOLUTION/ DROPS OPHTHALMIC
COMMUNITY
Start: 2023-03-14

## 2023-03-22 NOTE — PROGRESS NOTES
Subjective:      Patient ID: Earnestine Petit is a 62 y.o. female.    Chief Complaint: Pain of the Left Knee      61yo F with PHMx RA, osteoporosis presents with couple month history of left knee pain.  Pain is laterally and around the patella.  She only notes the symptoms when she is sitting on top of her left knee while her legs are crossed and getting up off the ground.  Has also noticed this pain with kneeling.  She has no symptoms with ambulation.  She has had 1 episode where the pain woke her up out of her sleep.  She is currently not taking any medication other than her methotrexate and Enbrel for her RA.  Has not tried any other conservative treatment.      Review of Systems   Constitutional: Negative for chills and fever.   Cardiovascular:  Negative for chest pain.   Respiratory:  Negative for cough.    Hematologic/Lymphatic: Does not bruise/bleed easily.   Skin:  Negative for poor wound healing and rash.   Musculoskeletal:  Positive for arthritis, joint pain, myalgias and stiffness.   Gastrointestinal:  Negative for abdominal pain.   Genitourinary:  Negative for bladder incontinence.   Neurological:  Negative for dizziness, loss of balance and weakness.   Psychiatric/Behavioral:  Negative for altered mental status.      Review of patient's allergies indicates:  No Known Allergies     Current Outpatient Medications   Medication Sig Dispense Refill    atorvastatin (LIPITOR) 20 MG tablet Take 1 tablet (20 mg total) by mouth every evening. 90 tablet 3    biotin 10,000 mcg Cap Take 1 capsule by mouth.      calcium carbonate (OS-ALIYAH) 600 mg calcium (1,500 mg) Tab Take 1,200 mg by mouth once daily at 6am.      cetirizine (ZYRTEC) 10 MG tablet Take 10 mg by mouth once daily.      cholecalciferol, vitamin D3, (VITAMIN D3) 50 mcg (2,000 unit) Cap Take 1 tablet by mouth.      denosumab (PROLIA) 60 mg/mL Syrg Inject 60 mg into the skin.      etanercept (ENBREL SURECLICK) 50 mg/mL (1 mL) Inject 1 mL (50 mg total) into the  "skin every 7 days. 4 mL 11    ferrous fumarate/vit Bcomp,C (SUPER B COMPLEX ORAL) Take 1 tablet by mouth.      FLOWFLEX COVID-19 AG HOME TEST Kit USE AS DIRECTED ON PACKAGE INSERT.      fluticasone propionate (FLONASE) 50 mcg/actuation nasal spray 2 sprays (100 mcg total) by Each Nostril route once daily. 16 g 12    folic acid (FOLVITE) 1 MG tablet Take 1 tablet (1 mg total) by mouth once daily. 90 tablet 3    glucosamine HCl 1,500 mg Tab Take 1 tablet by mouth.      Lactobac no.41/Bifidobact no.7 (PROBIOTIC-10 ORAL) Take 1 capsule by mouth.      lisinopriL (PRINIVIL,ZESTRIL) 5 MG tablet Take 1 tablet (5 mg total) by mouth once daily. 90 tablet 3    melatonin 5 mg Cap Take 1 capsule by mouth every evening.      methotrexate 2.5 MG Tab TAKE 3 TABLETS (7.5 MG TOTAL) BY MOUTH EVERY 7 DAYS. 12 tablet 1    metoprolol tartrate (LOPRESSOR) 25 MG tablet Take 1 tablet (25 mg total) by mouth nightly. 90 tablet 3    ondansetron (ZOFRAN-ODT) 8 MG TbDL DISSOLVE 1 TABLET BY MOUTH EVERY EIGHT HOURS  AS NEEDED FOR NAUSEA AND VOMOTING      traZODone (DESYREL) 50 MG tablet Take 1.5 tablets (75 mg total) by mouth every evening. 135 tablet 3    latanoprost 0.005 % ophthalmic solution PLACE ONE DROP IN EACH EYE AT BEDTIME       No current facility-administered medications for this visit.        The patient's relevant past medical, surgical, and social history was reviewed in Epic.       Objective:      VITAL SIGNS: /84   Pulse 66   Ht 5' 4" (1.626 m)   Wt 69.3 kg (152 lb 12.5 oz)   BMI 26.22 kg/m²     General    Nursing note and vitals reviewed.  Constitutional: She is oriented to person, place, and time. She appears well-developed and well-nourished.   Neurological: She is alert and oriented to person, place, and time.     General Musculoskeletal Exam   Gait: normal       Right Knee Exam     Range of Motion   Extension:  5   Flexion:  130     Left Knee Exam     Inspection   Swelling: absent    Tenderness   The patient tender " to palpation of the patella, iliotibial band and lateral retinaculum.    Range of Motion   Extension:  5   Flexion:  130     Tests   Meniscus   Xiomy:   Lateral - negative  Stability   Lachman: normal (-1 to 2mm)   LCL - Varus: normal (0 to 2mm)  Patella   Passive Patellar Tilt: lateral tilt    Other   Sensation: normalLeft Hip Exam     Tests   Sina: negative          Muscle Strength   Right Lower Extremity   Quadriceps:  3/5   Left Lower Extremity   Quadriceps:  3/5      X-Ray Knee Complete 4 or More Views Left  Narrative: EXAMINATION:  XR KNEE COMP 4 OR MORE VIEWS LEFT    CLINICAL HISTORY:  Pain in unspecified knee    TECHNIQUE:  Left knee four views    COMPARISON:  None    FINDINGS:  Mild DJD.  The medial tibiofemoral joint space is narrowed.  No fracture or dislocation.  No bone destruction identified  Impression: See above    Electronically signed by: Zak Vásquez MD  Date:    03/22/2023  Time:    09:19    I have reviewed the above radiograph and agree with the findings stated by the radiologist.         Assessment:       1. Quadriceps weakness    2. Acute pain of left knee    3. Rheumatoid arthritis, involving unspecified site, unspecified whether rheumatoid factor present    4. It band syndrome, left          Plan:         Earnestine was seen today for pain.    Diagnoses and all orders for this visit:    Quadriceps weakness  -     Ambulatory referral/consult to Physical/Occupational Therapy; Future    Acute pain of left knee  -     Ambulatory referral/consult to Orthopedics  -     Ambulatory referral/consult to Physical/Occupational Therapy; Future    Rheumatoid arthritis, involving unspecified site, unspecified whether rheumatoid factor present    It band syndrome, left  -     Ambulatory referral/consult to Physical/Occupational Therapy; Future    Patient has significant quad and ITB band weakness /tightness.  I believe this is causing instability in the patella and therefore a possible pseudosubluxation event  when she crosses her legs and tries to get up.  My recommendation would be a formal course of physical therapy to work on quad and ITB band strengthening and stabilization.  Do not believe the symptoms are related to arthritis and therefore not be relieved with an injection.  She may take Tylenol or anti-inflammatories as needed for pain.  Follow-up will be as needed.    Diagnoses and plan discussed with the patient, as well as the expected course and duration of his symptoms.  All questions and concerns were addressed prior to the end of the visit.   Instructed patient to call office if they have any future questions/concerns or to schedule apt. Patient will return to see me if symptoms worsen or fail to improve    Note dictated with voice recognition software, please excuse any grammatical errors.        Elysia Rivera PA-C   03/22/2023

## 2023-04-10 ENCOUNTER — PATIENT MESSAGE (OUTPATIENT)
Dept: PHARMACY | Facility: CLINIC | Age: 63
End: 2023-04-10
Payer: MEDICARE

## 2023-04-10 ENCOUNTER — SPECIALTY PHARMACY (OUTPATIENT)
Dept: PHARMACY | Facility: CLINIC | Age: 63
End: 2023-04-10
Payer: MEDICARE

## 2023-04-10 NOTE — TELEPHONE ENCOUNTER
Specialty Pharmacy - Refill Coordination    Specialty Medication Orders Linked to Encounter      Flowsheet Row Most Recent Value   Medication #1 etanercept (ENBREL SURECLICK) 50 mg/mL (1 mL) (Order#[id number], Rx#[id number])            Refill Questions - Documented Responses      Flowsheet Row Most Recent Value   Patient Availability and HIPAA Verification    Does patient want to proceed with activity? Yes   HIPAA/medical authority confirmed? Yes   Relationship to patient of person spoken to? Self   Refill Screening Questions    Changes to allergies? No   Changes to medications? No   New conditions since last clinic visit? No   Unplanned office visit, urgent care, ED, or hospital admission in the last 4 weeks? No   How does patient/caregiver feel medication is working? Good   Financial problems or insurance changes? No   How many doses of your specialty medications were missed in the last 4 weeks? 0   Would patient like to speak to a pharmacist? No   When does the patient need to receive the medication? 04/19/23   Refill Delivery Questions    How will the patient receive the medication? [facility]   When does the patient need to receive the medication? 04/19/23   Shipping Address Home   Address in [facility] confirmed and updated if neccessary? Yes   Expected Copay ($) 0   Is the patient able to afford the medication copay? Yes   Payment Method zero copay   Days supply of Refill 28   Supplies needed? No supplies needed   Refill activity completed? Yes   Refill activity plan Refill scheduled   Shipment/Pickup Date: 04/12/23            Current Outpatient Medications   Medication Sig    atorvastatin (LIPITOR) 20 MG tablet Take 1 tablet (20 mg total) by mouth every evening.    biotin 10,000 mcg Cap Take 1 capsule by mouth.    calcium carbonate (OS-ALIYAH) 600 mg calcium (1,500 mg) Tab Take 1,200 mg by mouth once daily at 6am.    cetirizine (ZYRTEC) 10 MG tablet Take 10 mg by mouth once daily.    cholecalciferol, vitamin  D3, (VITAMIN D3) 50 mcg (2,000 unit) Cap Take 1 tablet by mouth.    denosumab (PROLIA) 60 mg/mL Syrg Inject 60 mg into the skin.    etanercept (ENBREL SURECLICK) 50 mg/mL (1 mL) Inject 1 mL (50 mg total) into the skin every 7 days.    ferrous fumarate/vit Bcomp,C (SUPER B COMPLEX ORAL) Take 1 tablet by mouth.    FLOWFLEX COVID-19 AG HOME TEST Kit USE AS DIRECTED ON PACKAGE INSERT.    fluticasone propionate (FLONASE) 50 mcg/actuation nasal spray 2 sprays (100 mcg total) by Each Nostril route once daily.    folic acid (FOLVITE) 1 MG tablet Take 1 tablet (1 mg total) by mouth once daily.    glucosamine HCl 1,500 mg Tab Take 1 tablet by mouth.    Lactobac no.41/Bifidobact no.7 (PROBIOTIC-10 ORAL) Take 1 capsule by mouth.    latanoprost 0.005 % ophthalmic solution PLACE ONE DROP IN EACH EYE AT BEDTIME    lisinopriL (PRINIVIL,ZESTRIL) 5 MG tablet Take 1 tablet (5 mg total) by mouth once daily.    melatonin 5 mg Cap Take 1 capsule by mouth every evening.    methotrexate 2.5 MG Tab TAKE 3 TABLETS (7.5 MG TOTAL) BY MOUTH EVERY 7 DAYS.    metoprolol tartrate (LOPRESSOR) 25 MG tablet Take 1 tablet (25 mg total) by mouth nightly.    ondansetron (ZOFRAN-ODT) 8 MG TbDL DISSOLVE 1 TABLET BY MOUTH EVERY EIGHT HOURS  AS NEEDED FOR NAUSEA AND VOMOTING    traZODone (DESYREL) 50 MG tablet Take 1.5 tablets (75 mg total) by mouth every evening.   Last reviewed on 3/22/2023 11:14 AM by YOSEF HowardC    Review of patient's allergies indicates:  No Known Allergies Last reviewed on  3/22/2023 11:14 AM by Elysia Rivera      Tasks added this encounter   5/10/2023 - Refill Call (Auto Added)   Tasks due within next 3 months   6/29/2023 - Clinical - Follow Up Assesement (Annual)     Lashanda Snyder, PharmD  [facility]  [address] [place] [id number]  Phone: [phone]  Fax: [phone]

## 2023-05-10 ENCOUNTER — SPECIALTY PHARMACY (OUTPATIENT)
Dept: PHARMACY | Facility: CLINIC | Age: 63
End: 2023-05-10
Payer: MEDICARE

## 2023-05-10 NOTE — TELEPHONE ENCOUNTER
Specialty Pharmacy - Refill Coordination    Specialty Medication Orders Linked to Encounter      Flowsheet Row Most Recent Value   Medication #1 etanercept (ENBREL SURECLICK) 50 mg/mL (1 mL) (Order#[id number], Rx#[id number])            Refill Questions - Documented Responses      Flowsheet Row Most Recent Value   Patient Availability and HIPAA Verification    Does patient want to proceed with activity? Yes   HIPAA/medical authority confirmed? Yes   Relationship to patient of person spoken to? Self   Refill Screening Questions    Changes to allergies? No   Changes to medications? No   New conditions since last clinic visit? No   Unplanned office visit, urgent care, ED, or hospital admission in the last 4 weeks? No   How does patient/caregiver feel medication is working? Good   Financial problems or insurance changes? No   How many doses of your specialty medications were missed in the last 4 weeks? 0   Would patient like to speak to a pharmacist? No   When does the patient need to receive the medication? 05/17/23   Refill Delivery Questions    How will the patient receive the medication? MEDRx   When does the patient need to receive the medication? 05/17/23   Shipping Address Home   Address in [facility] confirmed and updated if neccessary? Yes   Expected Copay ($) 0   Is the patient able to afford the medication copay? Yes   Payment Method zero copay   Days supply of Refill 28   Supplies needed? No supplies needed   Refill activity completed? Yes   Refill activity plan Refill scheduled   Shipment/Pickup Date: 05/12/23            Current Outpatient Medications   Medication Sig    atorvastatin (LIPITOR) 20 MG tablet Take 1 tablet (20 mg total) by mouth every evening.    biotin 10,000 mcg Cap Take 1 capsule by mouth.    calcium carbonate (OS-ALIYAH) 600 mg calcium (1,500 mg) Tab Take 1,200 mg by mouth once daily at 6am.    cetirizine (ZYRTEC) 10 MG tablet Take 10 mg by mouth once daily.    cholecalciferol, vitamin  D3, (VITAMIN D3) 50 mcg (2,000 unit) Cap Take 1 tablet by mouth.    denosumab (PROLIA) 60 mg/mL Syrg Inject 60 mg into the skin.    etanercept (ENBREL SURECLICK) 50 mg/mL (1 mL) Inject 1 mL (50 mg total) into the skin every 7 days.    ferrous fumarate/vit Bcomp,C (SUPER B COMPLEX ORAL) Take 1 tablet by mouth.    FLOWFLEX COVID-19 AG HOME TEST Kit USE AS DIRECTED ON PACKAGE INSERT.    fluticasone propionate (FLONASE) 50 mcg/actuation nasal spray 2 sprays (100 mcg total) by Each Nostril route once daily.    folic acid (FOLVITE) 1 MG tablet Take 1 tablet (1 mg total) by mouth once daily.    glucosamine HCl 1,500 mg Tab Take 1 tablet by mouth.    Lactobac no.41/Bifidobact no.7 (PROBIOTIC-10 ORAL) Take 1 capsule by mouth.    latanoprost 0.005 % ophthalmic solution PLACE ONE DROP IN EACH EYE AT BEDTIME    lisinopriL (PRINIVIL,ZESTRIL) 5 MG tablet Take 1 tablet (5 mg total) by mouth once daily.    melatonin 5 mg Cap Take 1 capsule by mouth every evening.    methotrexate 2.5 MG Tab TAKE 3 TABLETS (7.5 MG TOTAL) BY MOUTH EVERY 7 DAYS.    metoprolol tartrate (LOPRESSOR) 25 MG tablet Take 1 tablet (25 mg total) by mouth nightly.    ondansetron (ZOFRAN-ODT) 8 MG TbDL DISSOLVE 1 TABLET BY MOUTH EVERY EIGHT HOURS  AS NEEDED FOR NAUSEA AND VOMOTING    traZODone (DESYREL) 50 MG tablet Take 1.5 tablets (75 mg total) by mouth every evening.   Last reviewed on 3/22/2023 11:14 AM by YOSEF HowardC    Review of patient's allergies indicates:  No Known Allergies Last reviewed on  3/22/2023 11:14 AM by Elysia Rivera      Tasks added this encounter   No tasks added.   Tasks due within next 3 months   6/29/2023 - Clinical Assessment (1 year recurrence)  5/10/2023 - Refill Coordination Outreach (1 time occurrence)     Bijan Powlel, PharmD  [facility]  [address] [place] [address]  Phone: [phone]  Fax: [phone]

## 2023-05-31 DIAGNOSIS — M06.9 RHEUMATOID ARTHRITIS INVOLVING MULTIPLE JOINTS: ICD-10-CM

## 2023-06-01 DIAGNOSIS — M06.9 RHEUMATOID ARTHRITIS INVOLVING MULTIPLE JOINTS: Primary | ICD-10-CM

## 2023-06-01 RX ORDER — METHOTREXATE 2.5 MG/1
7.5 TABLET ORAL
Qty: 36 TABLET | Refills: 0 | Status: SHIPPED | OUTPATIENT
Start: 2023-06-01 | End: 2023-08-02 | Stop reason: SDUPTHER

## 2023-06-14 ENCOUNTER — LAB VISIT (OUTPATIENT)
Dept: LAB | Facility: HOSPITAL | Age: 63
End: 2023-06-14
Attending: INTERNAL MEDICINE
Payer: MEDICARE

## 2023-06-14 ENCOUNTER — SPECIALTY PHARMACY (OUTPATIENT)
Dept: PHARMACY | Facility: CLINIC | Age: 63
End: 2023-06-14
Payer: MEDICARE

## 2023-06-14 DIAGNOSIS — M06.9 RHEUMATOID ARTHRITIS, INVOLVING UNSPECIFIED SITE, UNSPECIFIED WHETHER RHEUMATOID FACTOR PRESENT: Primary | ICD-10-CM

## 2023-06-14 DIAGNOSIS — M06.9 RHEUMATOID ARTHRITIS INVOLVING MULTIPLE JOINTS: ICD-10-CM

## 2023-06-14 LAB
ALBUMIN SERPL BCP-MCNC: 4.3 G/DL (ref 3.5–5.2)
ALP SERPL-CCNC: 56 U/L (ref 55–135)
ALT SERPL W/O P-5'-P-CCNC: 29 U/L (ref 10–44)
ANION GAP SERPL CALC-SCNC: 8 MMOL/L (ref 8–16)
AST SERPL-CCNC: 29 U/L (ref 10–40)
BASOPHILS # BLD AUTO: 0.06 K/UL (ref 0–0.2)
BASOPHILS NFR BLD: 0.7 % (ref 0–1.9)
BILIRUB SERPL-MCNC: 0.4 MG/DL (ref 0.1–1)
BUN SERPL-MCNC: 14 MG/DL (ref 8–23)
CALCIUM SERPL-MCNC: 10.5 MG/DL (ref 8.7–10.5)
CHLORIDE SERPL-SCNC: 104 MMOL/L (ref 95–110)
CO2 SERPL-SCNC: 27 MMOL/L (ref 23–29)
CREAT SERPL-MCNC: 0.8 MG/DL (ref 0.5–1.4)
CRP SERPL-MCNC: <0.3 MG/L (ref 0–8.2)
DIFFERENTIAL METHOD: NORMAL
EOSINOPHIL # BLD AUTO: 0.1 K/UL (ref 0–0.5)
EOSINOPHIL NFR BLD: 1.5 % (ref 0–8)
ERYTHROCYTE [DISTWIDTH] IN BLOOD BY AUTOMATED COUNT: 13.9 % (ref 11.5–14.5)
ERYTHROCYTE [SEDIMENTATION RATE] IN BLOOD BY PHOTOMETRIC METHOD: 4 MM/HR (ref 0–36)
EST. GFR  (NO RACE VARIABLE): >60 ML/MIN/1.73 M^2
GLUCOSE SERPL-MCNC: 100 MG/DL (ref 70–110)
HCT VFR BLD AUTO: 40.4 % (ref 37–48.5)
HGB BLD-MCNC: 13.1 G/DL (ref 12–16)
IMM GRANULOCYTES # BLD AUTO: 0.02 K/UL (ref 0–0.04)
IMM GRANULOCYTES NFR BLD AUTO: 0.2 % (ref 0–0.5)
LYMPHOCYTES # BLD AUTO: 2.2 K/UL (ref 1–4.8)
LYMPHOCYTES NFR BLD: 25.6 % (ref 18–48)
MCH RBC QN AUTO: 30.4 PG (ref 27–31)
MCHC RBC AUTO-ENTMCNC: 32.4 G/DL (ref 32–36)
MCV RBC AUTO: 94 FL (ref 82–98)
MONOCYTES # BLD AUTO: 0.7 K/UL (ref 0.3–1)
MONOCYTES NFR BLD: 7.9 % (ref 4–15)
NEUTROPHILS # BLD AUTO: 5.6 K/UL (ref 1.8–7.7)
NEUTROPHILS NFR BLD: 64.1 % (ref 38–73)
NRBC BLD-RTO: 0 /100 WBC
PLATELET # BLD AUTO: 240 K/UL (ref 150–450)
PMV BLD AUTO: 11 FL (ref 9.2–12.9)
POTASSIUM SERPL-SCNC: 5.1 MMOL/L (ref 3.5–5.1)
PROT SERPL-MCNC: 7.4 G/DL (ref 6–8.4)
RBC # BLD AUTO: 4.31 M/UL (ref 4–5.4)
SODIUM SERPL-SCNC: 139 MMOL/L (ref 136–145)
WBC # BLD AUTO: 8.72 K/UL (ref 3.9–12.7)

## 2023-06-14 PROCEDURE — 85025 COMPLETE CBC W/AUTO DIFF WBC: CPT | Performed by: INTERNAL MEDICINE

## 2023-06-14 PROCEDURE — 86140 C-REACTIVE PROTEIN: CPT | Performed by: INTERNAL MEDICINE

## 2023-06-14 PROCEDURE — 85652 RBC SED RATE AUTOMATED: CPT | Performed by: INTERNAL MEDICINE

## 2023-06-14 PROCEDURE — 36415 COLL VENOUS BLD VENIPUNCTURE: CPT | Mod: PO | Performed by: INTERNAL MEDICINE

## 2023-06-14 PROCEDURE — 80053 COMPREHEN METABOLIC PANEL: CPT | Performed by: INTERNAL MEDICINE

## 2023-06-14 NOTE — TELEPHONE ENCOUNTER
Specialty Pharmacy - Initial Clinical Assessment    Specialty Medication Orders Linked to Encounter      Flowsheet Row Most Recent Value   Medication #1 etanercept (ENBREL SURECLICK) 50 mg/mL (1 mL) (Order#[id number], Rx#[id number])          Patient Diagnosis   M06.9 - Rheumatoid arthritis, involving unspecified site, unspecified whether rheumatoid factor present    Subjective    Earnestine Petit is a 62 y.o. female, who is followed by the specialty pharmacy service for management and education.    Recent Encounters       Date Type Provider Description    06/14/2023 Specialty Pharmacy Peter Amos, ShylaD Initial Clinical Assessment    05/10/2023 Specialty Pharmacy Bijan Powell, PharmD Refill Coordination    04/10/2023 Specialty Pharmacy Lashanda Snyder, PharmD Refill Coordination    03/07/2023 Specialty Pharmacy Nicole Hardy, PharmD Refill Coordination    01/30/2023 Specialty Pharmacy Julianne Sanon, PharmD Refill Coordination            Current Outpatient Medications   Medication Sig    atorvastatin (LIPITOR) 20 MG tablet Take 1 tablet (20 mg total) by mouth every evening.    biotin 10,000 mcg Cap Take 1 capsule by mouth.    calcium carbonate (OS-ALIYAH) 600 mg calcium (1,500 mg) Tab Take 1,200 mg by mouth once daily at 6am.    cetirizine (ZYRTEC) 10 MG tablet Take 10 mg by mouth once daily.    cholecalciferol, vitamin D3, (VITAMIN D3) 50 mcg (2,000 unit) Cap Take 1 tablet by mouth.    denosumab (PROLIA) 60 mg/mL Syrg Inject 60 mg into the skin.    etanercept (ENBREL SURECLICK) 50 mg/mL (1 mL) Inject 1 mL (50 mg total) into the skin every 7 days.    ferrous fumarate/vit Bcomp,C (SUPER B COMPLEX ORAL) Take 1 tablet by mouth.    FLOWFLEX COVID-19 AG HOME TEST Kit USE AS DIRECTED ON PACKAGE INSERT.    fluticasone propionate (FLONASE) 50 mcg/actuation nasal spray 2 sprays (100 mcg total) by Each Nostril route once daily.    folic acid (FOLVITE) 1 MG tablet Take 1 tablet (1 mg total) by mouth once daily.    glucosamine  HCl 1,500 mg Tab Take 1 tablet by mouth.    Lactobac no.41/Bifidobact no.7 (PROBIOTIC-10 ORAL) Take 1 capsule by mouth.    latanoprost 0.005 % ophthalmic solution PLACE ONE DROP IN EACH EYE AT BEDTIME    lisinopriL (PRINIVIL,ZESTRIL) 5 MG tablet Take 1 tablet (5 mg total) by mouth once daily.    melatonin 5 mg Cap Take 1 capsule by mouth every evening.    methotrexate 2.5 MG Tab Take 3 tablets (7.5 mg total) by mouth every 7 days.    metoprolol tartrate (LOPRESSOR) 25 MG tablet Take 1 tablet (25 mg total) by mouth nightly.    ondansetron (ZOFRAN-ODT) 8 MG TbDL DISSOLVE 1 TABLET BY MOUTH EVERY EIGHT HOURS  AS NEEDED FOR NAUSEA AND VOMOTING    traZODone (DESYREL) 50 MG tablet Take 1.5 tablets (75 mg total) by mouth every evening.   Last reviewed on 3/22/2023 11:14 AM by Elysia Rivera PA-C    Review of patient's allergies indicates:  No Known AllergiesLast reviewed on  3/22/2023 11:14 AM by Elysia Rivera    Drug Interactions    Clinically relevant drug interactions identified: no           Assessment Questions - Documented Responses      Flowsheet Row Most Recent Value   Assessment    Medication Reconciliation completed for patient No   During the past 4 weeks, did patient have any of the following urgent care visits? None   Goals of Therapy Status Discussed (new start)   Status of the patients ability to self-administer: Is Able   All education points have been covered with patient? No, patient declined- printed education provided   Welcome packet contents reviewed and discussed with patient? Yes   Assesment completed? Yes   Plan Therapy continued   Do you need to open a clinical intervention (i-vent)? No   Do you want to schedule first shipment? Yes   Medication #1 Assessment Info    Patient status Existing medication, Exisiting to [facility]   Is this medication appropriate for the patient? Yes   Is this medication effective? Yes          Refill Questions - Documented Responses      Flowsheet Row Most Recent Value  "  Patient Availability and HIPAA Verification    Does patient want to proceed with activity? Unable to Reach   Refill Screening Questions    When does the patient need to receive the medication? 06/21/23   Refill Delivery Questions    How will the patient receive the medication? MEDRx   When does the patient need to receive the medication? 06/21/23   Shipping Address Home   Address in [facility] confirmed and updated if neccessary? Yes   Expected Copay ($) 0   Is the patient able to afford the medication copay? Yes   Payment Method zero copay   Days supply of Refill 28   Supplies needed? No supplies needed   Refill activity completed? Yes   Refill activity plan Refill scheduled   Shipment/Pickup Date: 06/19/23            Objective    She has a past medical history of Allergy, Arthritis, Hyperlipidemia, Hypertension, Osteoporosis, and Palpitations.    BP Readings from Last 4 Encounters:   03/22/23 130/84   03/14/23 128/84   01/05/23 (!) 142/88   12/17/22 120/80     Ht Readings from Last 4 Encounters:   03/22/23 5' 4" (1.626 m)   03/14/23 5' 4" (1.626 m)   01/05/23 5' 4.02" (1.626 m)   12/29/22 5' 4.02" (1.626 m)     Wt Readings from Last 4 Encounters:   03/22/23 69.3 kg (152 lb 12.5 oz)   03/14/23 70.3 kg (154 lb 15.7 oz)   01/05/23 68.9 kg (151 lb 14.4 oz)   12/29/22 67.9 kg (149 lb 11.1 oz)     No results for input(s): CREATININE, ALT, AST, HEPBSAG, HEPBSAB, HEPBCAB in the last 2160 hours.  The goals of rheumatoid arthritis treatment include:  Relieving pain and suppressing inflammation  Achieving remission and preventing joint and organ damage  Increasing joint mobility and strength  Preventing infection and other complications of treatment  Reducing long term complications of rheumatoid arthritis  Improving or maintaining physical function and optimal well-being  Improving or maintaining quality of life  Maintaining optimal therapy adherence  Minimizing and managing side effects    Goals of Therapy Status: " Discussed (new start)    Assessment/Plan  Patient plans to continue therapy without changes    Interventions added this encounter   Closed: [facility] Provider Intervention: etanercept (ENBREL SURECLICK) 50 mg/mL (1 mL)     Indication, dosage, appropriateness, effectiveness, safety and convenience of her specialty medication(s) were reviewed today.     Patient Education   Pharmacist offer to  patient was declined. Printed educational materials will be provided with medication.  Patient did accept verbal education on the following topics:  · Proper use, timely administration, and missed dose management        Tasks added this encounter   3/14/2024 - Clinical Assessment (1 year recurrence)   Tasks due within next 3 months   No tasks due.     Netta Obrien, PharmD  [facility]  [address] [facility] [address]  Phone: [phone]  Fax: [phone]

## 2023-06-14 NOTE — TELEPHONE ENCOUNTER
Specialty Pharmacy - Refill Coordination      Refill Questions - Documented Responses      Flowsheet Row Most Recent Value   Patient Availability and HIPAA Verification    Does patient want to proceed with activity? Unable to Reach          We have had multiple attempts to the patient and have been unsuccessful to reach the patient. We will stop reaching out to the patient but in the event that the patient needs the med and contacts us, we will communicate and begin dispensing for the patient. At your next visit with the patient, please review the importance of being in contact with our specialty pharmacy as a part of our care team.    Interventions added this encounter   Closed: [facility] Provider Intervention: etanercept (ENBREL SURECLICK) 50 mg/mL (1 mL)     Peter Amos, PharmD  [facility]  [address] [facility] [address]  Phone: [phone]  Fax: [phone]

## 2023-07-13 ENCOUNTER — SPECIALTY PHARMACY (OUTPATIENT)
Dept: PHARMACY | Facility: CLINIC | Age: 63
End: 2023-07-13
Payer: MEDICARE

## 2023-07-13 ENCOUNTER — PATIENT MESSAGE (OUTPATIENT)
Dept: PHARMACY | Facility: CLINIC | Age: 63
End: 2023-07-13
Payer: MEDICARE

## 2023-07-13 NOTE — TELEPHONE ENCOUNTER
Specialty Pharmacy - Refill Coordination    Specialty Medication Orders Linked to Encounter      Flowsheet Row Most Recent Value   Medication #1 etanercept (ENBREL SURECLICK) 50 mg/mL (1 mL) (Order#[id number], Rx#[id number])            Refill Questions - Documented Responses      Flowsheet Row Most Recent Value   Patient Availability and HIPAA Verification    Does patient want to proceed with activity? Yes   HIPAA/medical authority confirmed? Yes   Relationship to patient of person spoken to? Self   Refill Screening Questions    Changes to allergies? No   Changes to medications? No   New conditions since last clinic visit? No   Unplanned office visit, urgent care, ED, or hospital admission in the last 4 weeks? No   How does patient/caregiver feel medication is working? Good   Financial problems or insurance changes? No   How many doses of your specialty medications were missed in the last 4 weeks? 0   Would patient like to speak to a pharmacist? No   When does the patient need to receive the medication? 07/19/23   Refill Delivery Questions    How will the patient receive the medication? MEDRx   When does the patient need to receive the medication? 07/19/23   Shipping Address Home   Address in [facility] confirmed and updated if neccessary? Yes   Expected Copay ($) 0   Is the patient able to afford the medication copay? Yes   Payment Method zero copay   Days supply of Refill 28   Supplies needed? No supplies needed   Refill activity completed? Yes   Refill activity plan Refill scheduled   Shipment/Pickup Date: 07/17/23            Current Outpatient Medications   Medication Sig    atorvastatin (LIPITOR) 20 MG tablet Take 1 tablet (20 mg total) by mouth every evening.    biotin 10,000 mcg Cap Take 1 capsule by mouth.    calcium carbonate (OS-ALIYAH) 600 mg calcium (1,500 mg) Tab Take 1,200 mg by mouth once daily at 6am.    cetirizine (ZYRTEC) 10 MG tablet Take 10 mg by mouth once daily.    cholecalciferol, vitamin  D3, (VITAMIN D3) 50 mcg (2,000 unit) Cap Take 1 tablet by mouth.    denosumab (PROLIA) 60 mg/mL Syrg Inject 60 mg into the skin.    etanercept (ENBREL SURECLICK) 50 mg/mL (1 mL) Inject 1 mL (50 mg total) into the skin every 7 days.    ferrous fumarate/vit Bcomp,C (SUPER B COMPLEX ORAL) Take 1 tablet by mouth.    FLOWFLEX COVID-19 AG HOME TEST Kit USE AS DIRECTED ON PACKAGE INSERT.    fluticasone propionate (FLONASE) 50 mcg/actuation nasal spray 2 sprays (100 mcg total) by Each Nostril route once daily.    folic acid (FOLVITE) 1 MG tablet Take 1 tablet (1 mg total) by mouth once daily.    glucosamine HCl 1,500 mg Tab Take 1 tablet by mouth.    Lactobac no.41/Bifidobact no.7 (PROBIOTIC-10 ORAL) Take 1 capsule by mouth.    latanoprost 0.005 % ophthalmic solution PLACE ONE DROP IN EACH EYE AT BEDTIME    lisinopriL (PRINIVIL,ZESTRIL) 5 MG tablet Take 1 tablet (5 mg total) by mouth once daily.    melatonin 5 mg Cap Take 1 capsule by mouth every evening.    methotrexate 2.5 MG Tab Take 3 tablets (7.5 mg total) by mouth every 7 days.    metoprolol tartrate (LOPRESSOR) 25 MG tablet Take 1 tablet (25 mg total) by mouth nightly.    ondansetron (ZOFRAN-ODT) 8 MG TbDL DISSOLVE 1 TABLET BY MOUTH EVERY EIGHT HOURS  AS NEEDED FOR NAUSEA AND VOMOTING    traZODone (DESYREL) 50 MG tablet Take 1.5 tablets (75 mg total) by mouth every evening.   Last reviewed on 3/22/2023 11:14 AM by YOSEF HowardC    Review of patient's allergies indicates:  No Known Allergies Last reviewed on  3/22/2023 11:14 AM by Elysia Rivera      Tasks added this encounter   No tasks added.   Tasks due within next 3 months   7/16/2023 - Refill Coordination Outreach (1 time occurrence)     Karina Borden, PharmD  [facility]  [address] [place] [address]  Phone: [phone]  Fax: [phone]

## 2023-08-02 ENCOUNTER — OFFICE VISIT (OUTPATIENT)
Dept: RHEUMATOLOGY | Facility: CLINIC | Age: 63
End: 2023-08-02
Payer: MEDICARE

## 2023-08-02 DIAGNOSIS — M06.9 RHEUMATOID ARTHRITIS FLARE: Primary | ICD-10-CM

## 2023-08-02 DIAGNOSIS — M06.9 RHEUMATOID ARTHRITIS INVOLVING MULTIPLE JOINTS: ICD-10-CM

## 2023-08-02 PROCEDURE — 3044F PR MOST RECENT HEMOGLOBIN A1C LEVEL <7.0%: ICD-10-PCS | Mod: HCNC,CPTII,95, | Performed by: INTERNAL MEDICINE

## 2023-08-02 PROCEDURE — 4010F ACE/ARB THERAPY RXD/TAKEN: CPT | Mod: HCNC,CPTII,95, | Performed by: INTERNAL MEDICINE

## 2023-08-02 PROCEDURE — 99214 OFFICE O/P EST MOD 30 MIN: CPT | Mod: HCNC,95,, | Performed by: INTERNAL MEDICINE

## 2023-08-02 PROCEDURE — 3044F HG A1C LEVEL LT 7.0%: CPT | Mod: HCNC,CPTII,95, | Performed by: INTERNAL MEDICINE

## 2023-08-02 PROCEDURE — 4010F PR ACE/ARB THEARPY RXD/TAKEN: ICD-10-PCS | Mod: HCNC,CPTII,95, | Performed by: INTERNAL MEDICINE

## 2023-08-02 PROCEDURE — 99214 PR OFFICE/OUTPT VISIT, EST, LEVL IV, 30-39 MIN: ICD-10-PCS | Mod: HCNC,95,, | Performed by: INTERNAL MEDICINE

## 2023-08-02 RX ORDER — FOLIC ACID 1 MG/1
1 TABLET ORAL DAILY
Qty: 90 TABLET | Refills: 3 | Status: SHIPPED | OUTPATIENT
Start: 2023-08-02 | End: 2023-11-29

## 2023-08-02 RX ORDER — METHOTREXATE 2.5 MG/1
12.5 TABLET ORAL
Qty: 60 TABLET | Refills: 3 | Status: SHIPPED | OUTPATIENT
Start: 2023-08-02 | End: 2024-08-01

## 2023-08-02 NOTE — PROGRESS NOTES
Rapid3 Question Responses and Scores 7/28/2023   MDHAQ Score 0   Psychologic Score 0   Pain Score 0.5   When you awakened in the morning OVER THE LAST WEEK, did you feel stiff? No   If Yes, please indicate the number of hours until you are as limber as you will be for the day -   Fatigue Score 0   Global Health Score 0   RAPID3 Score 0.17     Answers submitted by the patient for this visit:  Rheumatology Questionnaire (Submitted on 7/28/2023)  fever: No  eye redness: No  mouth sores: No  headaches: No  shortness of breath: No  chest pain: No  trouble swallowing: No  diarrhea: No  constipation: No  unexpected weight change: No  genital sore: No  dysuria: No  During the last 3 days, have you had a skin rash?: No  Bruises or bleeds easily: No  cough: No

## 2023-08-02 NOTE — PROGRESS NOTES
Subjective:       Patient ID: Earnestine Petit is a 61 y.o. female.    Chief Complaint: Disease Management    HPI 61 year old F with PMH of RA (around age 57), osteoporosis, palpitations here for evaluation.  When she was fist diagnosed with RA, she had involvement of shoulders, elbows, hands,wrists, knees, ankle, and feet.  She was started on MTX and prednisone at time of diagnosis.  She is taking MTX 3 pills once a week for a year and also on enbrel for over 3 years. She is taking folic acid 1 mg a day.  She has mild pain in right knee, right wrist, and some pain in hands with making a fist. She also has pain in feet. She reports she gets swelling in left second finger and other fingers on left hand. On occasion, her right knee will get swollen.  Resting improves the pain. Denies any rashes, oral ulcers, hair loss, fevers,or photosensitivity. She smoked when she was 14 just socially.      She is on prolia for a year and half for osteoporosis. She was on avista for 3 years.  She has had osteoporosis since age 52.    Family hx: sister: RA  Aunt: RA    Interval history: She is taking MTX 3 pills once a week. She is taking folic acid  1 mg a day.  She is on enbrel.  Reports worsening pain and swelling in hands and wrists.  Past Medical History:   Diagnosis Date    Arthritis     RH    Hyperlipidemia     Hypertension     Osteoporosis        Review of Systems   Constitutional: Negative for activity change, appetite change, chills, diaphoresis, fatigue, fever and unexpected weight change.   HENT: Negative for congestion, ear discharge, ear pain, facial swelling, mouth sores, sinus pressure, sneezing, sore throat, tinnitus and trouble swallowing.    Eyes: Negative for photophobia, pain, discharge, redness, itching and visual disturbance.   Respiratory: Negative for apnea, cough, chest tightness, shortness of breath, wheezing and stridor.    Cardiovascular: Negative for chest pain and leg swelling.   Gastrointestinal:  Negative for abdominal distention, abdominal pain, anal bleeding, blood in stool, constipation, diarrhea and nausea.   Endocrine: Negative for cold intolerance and heat intolerance.   Genitourinary: Negative for difficulty urinating, dysuria and genital sores.   Musculoskeletal: Positive for arthralgias. Negative for back pain, gait problem, joint swelling, myalgias, neck pain and neck stiffness.   Skin: Negative for color change, pallor, rash and wound.   Neurological: Negative for dizziness, seizures, light-headedness, numbness and headaches.   Hematological: Negative for adenopathy. Does not bruise/bleed easily.   Psychiatric/Behavioral: Negative for sleep disturbance. The patient is not nervous/anxious.            Objective:        Physical Exam   Constitutional: She is oriented to person, place, and time.   HENT:   Head: Normocephalic and atraumatic.   Right Ear: External ear normal.   Left Ear: External ear normal.   Nose: Nose normal.   Mouth/Throat: Oropharynx is clear and moist. No oropharyngeal exudate.   Eyes: Conjunctivae and EOM are normal. Pupils are equal, round, and reactive to light. Right eye exhibits no discharge. Left eye exhibits no discharge. No scleral icterus.   Neck: No JVD present. No thyromegaly present.   Cardiovascular: Normal rate, regular rhythm, normal heart sounds and intact distal pulses.  Exam reveals no gallop and no friction rub.    No murmur heard.  Pulmonary/Chest: Effort normal and breath sounds normal. No respiratory distress. She has no wheezes. She has no rales. She exhibits no tenderness.   Abdominal: Soft. Bowel sounds are normal. She exhibits no distension and no mass. There is no abdominal tenderness. There is no rebound and no guarding.   Lymphadenopathy:     She has no cervical adenopathy.   Neurological: She is alert and oriented to person, place, and time. No cranial nerve deficit. Gait normal. Coordination normal.   Skin: Skin is dry. No rash noted. No erythema. No  pallor.     Psychiatric: Affect and judgment normal.   Musculoskeletal: Deformity present. No tenderness or edema.           Assessment:     62  year old F with PMH of RA (around age 57), osteoporosis, palpitations here for evaluation.   She is on MTX 3 pills once a week and enbrel and is flaring so will increase MTX from 3 pills once a week to 5 pills once a week.    Plan:       Problem List Items Addressed This Visit    None       Increase from MTX 3 pills once week  to 5 pills once a week  Continue folic acid 1 mg po qday  Labs in a month    The patient location is: 30  The chief complaint leading to consultation is: home    Visit type: audiovisual    Face to Face time with patient: 30   minutes of total time spent on the encounter, which includes face to face time and non-face to face time preparing to see the patient (eg, review of tests), Obtaining and/or reviewing separately obtained history, Documenting clinical information in the electronic or other health record, Independently interpreting results (not separately reported) and communicating results to the patient/family/caregiver, or Care coordination (not separately reported).         Each patient to whom he or she provides medical services by telemedicine is:  (1) informed of the relationship between the physician and patient and the respective role of any other health care provider with respect to management of the patient; and (2) notified that he or she may decline to receive medical services by telemedicine and may withdraw from such care at any time.    Notes:

## 2023-08-11 ENCOUNTER — SPECIALTY PHARMACY (OUTPATIENT)
Dept: PHARMACY | Facility: CLINIC | Age: 63
End: 2023-08-11
Payer: MEDICARE

## 2023-08-11 NOTE — TELEPHONE ENCOUNTER
Specialty Pharmacy - Refill Coordination    Specialty Medication Orders Linked to Encounter      Flowsheet Row Most Recent Value   Medication #1 etanercept (ENBREL SURECLICK) 50 mg/mL (1 mL) (Order#[id number], Rx#[id number])            Refill Questions - Documented Responses      Flowsheet Row Most Recent Value   Patient Availability and HIPAA Verification    Does patient want to proceed with activity? Yes   HIPAA/medical authority confirmed? Yes   Relationship to patient of person spoken to? Self   Refill Screening Questions    Changes to allergies? No   Changes to medications? No   New conditions since last clinic visit? No   Unplanned office visit, urgent care, ED, or hospital admission in the last 4 weeks? No   How does patient/caregiver feel medication is working? Good   Financial problems or insurance changes? No   How many doses of your specialty medications were missed in the last 4 weeks? 0   Would patient like to speak to a pharmacist? No   When does the patient need to receive the medication? 08/16/23   Refill Delivery Questions    How will the patient receive the medication? [facility]   When does the patient need to receive the medication? 08/16/23   Shipping Address Home   Address in [facility] confirmed and updated if neccessary? Yes   Expected Copay ($) 0   Is the patient able to afford the medication copay? Yes   Payment Method zero copay   Days supply of Refill 28   Supplies needed? No supplies needed   Refill activity completed? Yes   Refill activity plan Refill scheduled   Shipment/Pickup Date: 08/14/23            Current Outpatient Medications   Medication Sig    atorvastatin (LIPITOR) 20 MG tablet Take 1 tablet (20 mg total) by mouth every evening.    biotin 10,000 mcg Cap Take 1 capsule by mouth.    calcium carbonate (OS-ALIYAH) 600 mg calcium (1,500 mg) Tab Take 1,200 mg by mouth once daily at 6am.    cetirizine (ZYRTEC) 10 MG tablet Take 10 mg by mouth once daily.    cholecalciferol, vitamin  D3, (VITAMIN D3) 50 mcg (2,000 unit) Cap Take 1 tablet by mouth.    denosumab (PROLIA) 60 mg/mL Syrg Inject 60 mg into the skin.    etanercept (ENBREL SURECLICK) 50 mg/mL (1 mL) Inject 1 mL (50 mg total) into the skin every 7 days.    ferrous fumarate/vit Bcomp,C (SUPER B COMPLEX ORAL) Take 1 tablet by mouth.    FLOWFLEX COVID-19 AG HOME TEST Kit USE AS DIRECTED ON PACKAGE INSERT.    fluticasone propionate (FLONASE) 50 mcg/actuation nasal spray 2 sprays (100 mcg total) by Each Nostril route once daily.    folic acid (FOLVITE) 1 MG tablet Take 1 tablet (1 mg total) by mouth once daily.    glucosamine HCl 1,500 mg Tab Take 1 tablet by mouth.    Lactobac no.41/Bifidobact no.7 (PROBIOTIC-10 ORAL) Take 1 capsule by mouth.    latanoprost 0.005 % ophthalmic solution PLACE ONE DROP IN EACH EYE AT BEDTIME    lisinopriL (PRINIVIL,ZESTRIL) 5 MG tablet Take 1 tablet (5 mg total) by mouth once daily.    melatonin 5 mg Cap Take 1 capsule by mouth every evening.    methotrexate 2.5 MG Tab Take 5 tablets (12.5 mg total) by mouth every 7 days.    metoprolol tartrate (LOPRESSOR) 25 MG tablet Take 1 tablet (25 mg total) by mouth nightly.    ondansetron (ZOFRAN-ODT) 8 MG TbDL DISSOLVE 1 TABLET BY MOUTH EVERY EIGHT HOURS  AS NEEDED FOR NAUSEA AND VOMOTING    traZODone (DESYREL) 50 MG tablet Take 1.5 tablets (75 mg total) by mouth every evening.   Last reviewed on 3/22/2023 11:14 AM by Elysia Rivera PA-C    Review of patient's allergies indicates:  No Known Allergies Last reviewed on  8/2/2023 9:25 AM by Jeanine Emmanuel      Tasks added this encounter   No tasks added.   Tasks due within next 3 months   8/13/2023 - Refill Coordination Outreach (1 time occurrence)     [facility] Specialty Pharm[facility]  [address] [place] [id number]  Phone: [phone]  Fax: [phone]

## 2023-08-22 ENCOUNTER — PES CALL (OUTPATIENT)
Dept: ADMINISTRATIVE | Facility: CLINIC | Age: 63
End: 2023-08-22
Payer: MEDICARE

## 2023-09-12 ENCOUNTER — OFFICE VISIT (OUTPATIENT)
Dept: FAMILY MEDICINE | Facility: CLINIC | Age: 63
End: 2023-09-12
Payer: MEDICARE

## 2023-09-12 VITALS
WEIGHT: 153.44 LBS | HEIGHT: 64 IN | SYSTOLIC BLOOD PRESSURE: 128 MMHG | HEART RATE: 68 BPM | BODY MASS INDEX: 26.2 KG/M2 | OXYGEN SATURATION: 95 % | DIASTOLIC BLOOD PRESSURE: 82 MMHG

## 2023-09-12 DIAGNOSIS — D84.821 DRUG-INDUCED IMMUNODEFICIENCY: ICD-10-CM

## 2023-09-12 DIAGNOSIS — M62.81 QUADRICEPS WEAKNESS: ICD-10-CM

## 2023-09-12 DIAGNOSIS — I10 ESSENTIAL HYPERTENSION: Primary | ICD-10-CM

## 2023-09-12 DIAGNOSIS — M76.32 ILIOTIBIAL BAND SYNDROME OF LEFT SIDE: ICD-10-CM

## 2023-09-12 DIAGNOSIS — F51.01 PRIMARY INSOMNIA: ICD-10-CM

## 2023-09-12 DIAGNOSIS — R00.2 PALPITATION: ICD-10-CM

## 2023-09-12 DIAGNOSIS — J30.89 ENVIRONMENTAL AND SEASONAL ALLERGIES: ICD-10-CM

## 2023-09-12 DIAGNOSIS — Z79.899 DRUG-INDUCED IMMUNODEFICIENCY: ICD-10-CM

## 2023-09-12 DIAGNOSIS — E78.5 HYPERLIPIDEMIA, UNSPECIFIED HYPERLIPIDEMIA TYPE: ICD-10-CM

## 2023-09-12 DIAGNOSIS — M06.9 RHEUMATOID ARTHRITIS, INVOLVING UNSPECIFIED SITE, UNSPECIFIED WHETHER RHEUMATOID FACTOR PRESENT: ICD-10-CM

## 2023-09-12 DIAGNOSIS — M81.0 OSTEOPOROSIS WITHOUT CURRENT PATHOLOGICAL FRACTURE, UNSPECIFIED OSTEOPOROSIS TYPE: ICD-10-CM

## 2023-09-12 PROCEDURE — 99999 PR PBB SHADOW E&M-EST. PATIENT-LVL V: ICD-10-PCS | Mod: PBBFAC,HCNC,, | Performed by: INTERNAL MEDICINE

## 2023-09-12 PROCEDURE — 1160F RVW MEDS BY RX/DR IN RCRD: CPT | Mod: HCNC,CPTII,S$GLB, | Performed by: INTERNAL MEDICINE

## 2023-09-12 PROCEDURE — 3008F PR BODY MASS INDEX (BMI) DOCUMENTED: ICD-10-PCS | Mod: HCNC,CPTII,S$GLB, | Performed by: INTERNAL MEDICINE

## 2023-09-12 PROCEDURE — 99215 PR OFFICE/OUTPT VISIT, EST, LEVL V, 40-54 MIN: ICD-10-PCS | Mod: HCNC,S$GLB,, | Performed by: INTERNAL MEDICINE

## 2023-09-12 PROCEDURE — 1159F PR MEDICATION LIST DOCUMENTED IN MEDICAL RECORD: ICD-10-PCS | Mod: HCNC,CPTII,S$GLB, | Performed by: INTERNAL MEDICINE

## 2023-09-12 PROCEDURE — 3044F HG A1C LEVEL LT 7.0%: CPT | Mod: HCNC,CPTII,S$GLB, | Performed by: INTERNAL MEDICINE

## 2023-09-12 PROCEDURE — 4010F PR ACE/ARB THEARPY RXD/TAKEN: ICD-10-PCS | Mod: HCNC,CPTII,S$GLB, | Performed by: INTERNAL MEDICINE

## 2023-09-12 PROCEDURE — 4010F ACE/ARB THERAPY RXD/TAKEN: CPT | Mod: HCNC,CPTII,S$GLB, | Performed by: INTERNAL MEDICINE

## 2023-09-12 PROCEDURE — 1101F PR PT FALLS ASSESS DOC 0-1 FALLS W/OUT INJ PAST YR: ICD-10-PCS | Mod: HCNC,CPTII,S$GLB, | Performed by: INTERNAL MEDICINE

## 2023-09-12 PROCEDURE — 3079F DIAST BP 80-89 MM HG: CPT | Mod: HCNC,CPTII,S$GLB, | Performed by: INTERNAL MEDICINE

## 2023-09-12 PROCEDURE — 1160F PR REVIEW ALL MEDS BY PRESCRIBER/CLIN PHARMACIST DOCUMENTED: ICD-10-PCS | Mod: HCNC,CPTII,S$GLB, | Performed by: INTERNAL MEDICINE

## 2023-09-12 PROCEDURE — 1101F PT FALLS ASSESS-DOCD LE1/YR: CPT | Mod: HCNC,CPTII,S$GLB, | Performed by: INTERNAL MEDICINE

## 2023-09-12 PROCEDURE — 3074F SYST BP LT 130 MM HG: CPT | Mod: HCNC,CPTII,S$GLB, | Performed by: INTERNAL MEDICINE

## 2023-09-12 PROCEDURE — 3074F PR MOST RECENT SYSTOLIC BLOOD PRESSURE < 130 MM HG: ICD-10-PCS | Mod: HCNC,CPTII,S$GLB, | Performed by: INTERNAL MEDICINE

## 2023-09-12 PROCEDURE — 3008F BODY MASS INDEX DOCD: CPT | Mod: HCNC,CPTII,S$GLB, | Performed by: INTERNAL MEDICINE

## 2023-09-12 PROCEDURE — 99499 RISK ADDL DX/OHS AUDIT: ICD-10-PCS | Mod: S$GLB,,, | Performed by: INTERNAL MEDICINE

## 2023-09-12 PROCEDURE — 99999 PR PBB SHADOW E&M-EST. PATIENT-LVL V: CPT | Mod: PBBFAC,HCNC,, | Performed by: INTERNAL MEDICINE

## 2023-09-12 PROCEDURE — 99215 OFFICE O/P EST HI 40 MIN: CPT | Mod: HCNC,S$GLB,, | Performed by: INTERNAL MEDICINE

## 2023-09-12 PROCEDURE — 3079F PR MOST RECENT DIASTOLIC BLOOD PRESSURE 80-89 MM HG: ICD-10-PCS | Mod: HCNC,CPTII,S$GLB, | Performed by: INTERNAL MEDICINE

## 2023-09-12 PROCEDURE — 99499 UNLISTED E&M SERVICE: CPT | Mod: S$GLB,,, | Performed by: INTERNAL MEDICINE

## 2023-09-12 PROCEDURE — 3044F PR MOST RECENT HEMOGLOBIN A1C LEVEL <7.0%: ICD-10-PCS | Mod: HCNC,CPTII,S$GLB, | Performed by: INTERNAL MEDICINE

## 2023-09-12 PROCEDURE — 1159F MED LIST DOCD IN RCRD: CPT | Mod: HCNC,CPTII,S$GLB, | Performed by: INTERNAL MEDICINE

## 2023-09-12 PROCEDURE — 3288F PR FALLS RISK ASSESSMENT DOCUMENTED: ICD-10-PCS | Mod: HCNC,CPTII,S$GLB, | Performed by: INTERNAL MEDICINE

## 2023-09-12 PROCEDURE — 3288F FALL RISK ASSESSMENT DOCD: CPT | Mod: HCNC,CPTII,S$GLB, | Performed by: INTERNAL MEDICINE

## 2023-09-12 RX ORDER — TRAZODONE HYDROCHLORIDE 50 MG/1
100 TABLET ORAL NIGHTLY PRN
Qty: 180 TABLET | Refills: 3 | Status: SHIPPED | OUTPATIENT
Start: 2023-09-12 | End: 2023-12-20

## 2023-09-12 NOTE — PROGRESS NOTES
Subjective:       Patient ID: Earnestine Petit is a 63 y.o. female.    Chief Complaint: Follow-up (6 month)      HPI  Earnestine Petit is a 63 y.o. female with rheumatoid arthritis,hypertension, hyperlipidemia, osteoarthritis, insomnia, anxiety, osteoporosis  who presents today for Follow-up (6 month)    Overall feels okay with occasional allergies that controlled with Zyrtec and Flonase.  Allergies associated to dogs and tolerate someone at home with the medications.    Rheumatoid arthritis seem to be stable and chronic knee pains probably associated to osteoarthritis.  Saw orthopedist and believe discomfort is associated to IT band pain and quadriceps weakness.  Feels her knee joint moves out of place when she bends her knees.  Physical therapy was recommended initially but patient prefers to wait.  Surgery so far not needed.    Had labs which most of them were within normal limits she is concerned that the calcium is on the higher side.  In the past PTH was normal.  Was being followed by Nephrology and only needing supplements.  Had a bone density test with her gynecologist and still having osteoporosis despite Prolia.  Had Fosamax in the past.    Has been drinking more water but not thirsty.  Reports occasional palpitations which could be with or without exertion.  Not as often as before.  She saw a cardiologist around 2 years or more ago which workup included ultrasounds, 30 day monitor, and stress test.  Workup was within normal limits.  Had no palpitations during the evaluation time.  She is on atorvastatin with an acceptable lipid profile.    Has no toxic.  He is .  Weight has been stable.  Does not follow a specific diet but watches her portions.  She exercises on a treadmill for 15 to sometimes 30 minutes but limited due to knee pains.    She is up-to-date with mammogram, bone density test, and colon cancer screening.    Health Maintenance:  Health Maintenance   Topic Date Due    Hepatitis C Screening   Never done    Mammogram  02/23/2024    DEXA Scan  02/23/2026    TETANUS VACCINE  05/02/2027    Lipid Panel  03/10/2028    Colorectal Cancer Screening  02/08/2032    Shingles Vaccine  Completed       Review of Systems   Constitutional: Negative.  Negative for fever and unexpected weight change.   HENT:  Positive for nasal congestion and rhinorrhea.    Respiratory: Negative.  Negative for cough (occasional, recent cold, resolved) and shortness of breath.    Cardiovascular: Negative.    Gastrointestinal: Negative.  Negative for constipation (occasional, better with Miralax).   Genitourinary:  Negative for difficulty urinating.   Musculoskeletal: Negative.    Integumentary:  Negative.   Neurological: Negative.    Psychiatric/Behavioral:  Positive for sleep disturbance (Better with medication).       Past Medical History:   Diagnosis Date    Allergy     Arthritis     RH    Hyperlipidemia     Hypertension     Osteoporosis     Palpitations        Past Surgical History:   Procedure Laterality Date    ADENOIDECTOMY      TONSILLECTOMY         Family History   Problem Relation Age of Onset    Diabetes Mother     Cancer Father         Stomach or pancreas    Heart disease Father     Hyperlipidemia Sister     Hypertension Sister     Heart disease Sister     Rheum arthritis Sister     Cancer Brother     Diabetes Maternal Grandmother     Heart disease Maternal Grandfather     Allergies Daughter     Asthma Neg Hx        Social History     Socioeconomic History    Marital status:    Tobacco Use    Smoking status: Never     Passive exposure: Never    Smokeless tobacco: Never   Substance and Sexual Activity    Alcohol use: Yes     Alcohol/week: 7.0 standard drinks of alcohol     Types: 7 Glasses of wine per week     Comment: A glass of wine daily       Current Outpatient Medications   Medication Sig Dispense Refill    atorvastatin (LIPITOR) 20 MG tablet Take 1 tablet (20 mg total) by mouth every evening. 90 tablet 3    calcium  carbonate (OS-ALIYAH) 600 mg calcium (1,500 mg) Tab Take 1,200 mg by mouth once daily at 6am.      cetirizine (ZYRTEC) 10 MG tablet Take 10 mg by mouth once daily.      cholecalciferol, vitamin D3, (VITAMIN D3) 50 mcg (2,000 unit) Cap Take 1 tablet by mouth.      denosumab (PROLIA) 60 mg/mL Syrg Inject 60 mg into the skin.      etanercept (ENBREL SURECLICK) 50 mg/mL (1 mL) Inject 1 mL (50 mg total) into the skin every 7 days. 4 mL 11    ferrous fumarate/vit Bcomp,C (SUPER B COMPLEX ORAL) Take 1 tablet by mouth.      fluticasone propionate (FLONASE) 50 mcg/actuation nasal spray 2 sprays (100 mcg total) by Each Nostril route once daily. 16 g 12    folic acid (FOLVITE) 1 MG tablet Take 1 tablet (1 mg total) by mouth once daily. 90 tablet 3    glucosamine HCl 1,500 mg Tab Take 1 tablet by mouth.      Lactobac no.41/Bifidobact no.7 (PROBIOTIC-10 ORAL) Take 1 capsule by mouth.      latanoprost 0.005 % ophthalmic solution PLACE ONE DROP IN EACH EYE AT BEDTIME      lisinopriL (PRINIVIL,ZESTRIL) 5 MG tablet Take 1 tablet (5 mg total) by mouth once daily. 90 tablet 3    melatonin 5 mg Cap Take 1 capsule by mouth every evening.      methotrexate 2.5 MG Tab Take 5 tablets (12.5 mg total) by mouth every 7 days. 60 tablet 3    metoprolol tartrate (LOPRESSOR) 25 MG tablet Take 1 tablet (25 mg total) by mouth nightly. 90 tablet 3    traZODone (DESYREL) 50 MG tablet Take 1.5 tablets (75 mg total) by mouth every evening. 135 tablet 3    biotin 10,000 mcg Cap Take 1 capsule by mouth.      FLOWFLEX COVID-19 AG HOME TEST Kit USE AS DIRECTED ON PACKAGE INSERT.      ondansetron (ZOFRAN-ODT) 8 MG TbDL DISSOLVE 1 TABLET BY MOUTH EVERY EIGHT HOURS  AS NEEDED FOR NAUSEA AND VOMOTING       No current facility-administered medications for this visit.       Review of patient's allergies indicates:  No Known Allergies      Objective:       Last 3 sets of Vitals        3/14/2023     9:38 AM 3/22/2023     8:50 AM 9/12/2023     9:50 AM   Vitals - 1  "value per visit   SYSTOLIC 128 130 128   DIASTOLIC 84 84 82   Pulse 72 66 68   SPO2 98 %  95 %   Weight (lb) 154.98 152.78 153.44   Weight (kg) 70.3 69.3 69.6   Height 5' 4" (1.626 m) 5' 4" (1.626 m) 5' 4" (1.626 m)   BMI (Calculated) 26.6 26.2 26.3   Pain Score Zero Zero Zero   Physical Exam  Constitutional:       General: She is not in acute distress.  HENT:      Head: Normocephalic.      Right Ear: Tympanic membrane, ear canal and external ear normal.      Left Ear: Tympanic membrane, ear canal and external ear normal.      Nose: Nose normal.      Mouth/Throat:      Mouth: Mucous membranes are moist.   Eyes:      General: No scleral icterus.     Extraocular Movements: Extraocular movements intact.      Conjunctiva/sclera: Conjunctivae normal.   Neck:      Vascular: No carotid bruit.      Comments: No goiter.  Cardiovascular:      Rate and Rhythm: Normal rate and regular rhythm.      Pulses: Normal pulses.      Heart sounds: Normal heart sounds.   Pulmonary:      Effort: Pulmonary effort is normal.      Breath sounds: Normal breath sounds.   Abdominal:      General: Bowel sounds are normal. There is no distension.      Palpations: Abdomen is soft. There is no mass.      Tenderness: There is no abdominal tenderness.   Musculoskeletal:         General: No swelling.   Lymphadenopathy:      Cervical: No cervical adenopathy.   Skin:     General: Skin is warm and dry.   Neurological:      General: No focal deficit present.      Mental Status: She is alert and oriented to person, place, and time.   Psychiatric:         Mood and Affect: Mood normal.         Behavior: Behavior normal.           CBC:  Recent Labs   Lab 03/07/23  1001 03/10/23  0825 06/14/23  1120   WBC 8.5 7.73 8.72   RBC 4.27 4.24 4.31   Hemoglobin 13.3 13.0 13.1   Hematocrit 39.2 39.7 40.4   Platelets 254 242 240   MCV 91.8 94 94   MCH 31.1 30.7 30.4   MCHC 33.9 32.7 32.4     CMP:  Recent Labs   Lab 03/07/23  1001 03/10/23  0825 06/14/23  1120   Glucose " 99 100 100   Calcium 9.7 9.9 10.5   Albumin 4.5 4.3 4.3   Total Protein 7.2 7.8 7.4   Sodium 137 139 139   Potassium 4.0 4.5 5.1   CO2 28 25 27   Chloride 103 107 104   BUN 15 15 14   Creatinine 0.68 0.8 0.8   Alkaline Phosphatase  --  59 56   ALT 19 27 29   AST 20 25 29   Total Bilirubin 0.5 0.5 0.4     URINALYSIS:  Recent Labs   Lab 09/13/22  1127   Color, UA Colorless A   Specific Gravity, UA <1.005 A   pH, UA 7.0   Protein, UA Negative   Nitrite, UA Negative   Leukocytes, UA Negative   Urobilinogen, UA Negative      LIPIDS:  Recent Labs   Lab 07/06/22  0917 03/10/23  0825   TSH 3.01 3.056   HDL 55 69   Cholesterol 152 173   Triglycerides 122 79   LDL Cholesterol 77 88.2   HDL/Cholesterol Ratio 2.8 39.9   Non-HDL Cholesterol  --  104   Non HDL Chol. (LDL+VLDL) 97  --    Total Cholesterol/HDL Ratio  --  2.5     TSH:  Recent Labs   Lab 07/06/22  0917 03/10/23  0825   TSH 3.01 3.056       A1C:  Recent Labs   Lab 07/06/22  0917 03/10/23  0825   Hemoglobin A1C 5.4 5.3       Imaging:  X-Ray Knee Complete 4 or More Views Left  Narrative: EXAMINATION:  XR KNEE COMP 4 OR MORE VIEWS LEFT    CLINICAL HISTORY:  Pain in unspecified knee    TECHNIQUE:  Left knee four views    COMPARISON:  None    FINDINGS:  Mild DJD.  The medial tibiofemoral joint space is narrowed.  No fracture or dislocation.  No bone destruction identified  Impression: See above    Electronically signed by: Zak Vásquez MD  Date:    03/22/2023  Time:    09:19      Assessment:       1. Essential hypertension    2. Hyperlipidemia, unspecified hyperlipidemia type    3. Rheumatoid arthritis, involving unspecified site, unspecified whether rheumatoid factor present    4. Drug-induced immunodeficiency    5. Primary insomnia    6. Environmental and seasonal allergies    7. Osteoporosis without current pathological fracture, unspecified osteoporosis type            Plan:       1. Essential hypertension   - Controlled blood pressure on lisinopril.  No significant  side effects with the medication.    2. Hyperlipidemia, unspecified hyperlipidemia type   - Continue atorvastatin.    3. Rheumatoid arthritis, involving unspecified site, unspecified whether rheumatoid factor present   - Follows with Rheumatology.  Appears to be controlled.    4. Drug-induced immunodeficiency   - On Enbrel per rheumatology.    5. Primary insomnia  -     2 trazodone tablets helps, occasionally will need melatonin.  - traZODone (DESYREL) 50 MG tablet; Take 2 tablets (100 mg total) by mouth nightly as needed for Insomnia.  Dispense: 180 tablet; Refill: 3    6. Environmental and seasonal allergies   - continue Zyrtec and Flonase as needed.  No longer using Singulair.    7. Osteoporosis without current pathological fracture, unspecified osteoporosis type  -     Comprehensive Metabolic Panel; Future; Expected date: 09/12/2023  -     PTH, intact; Future; Expected date: 09/12/2023  - followed by gynecologist.  Will request copy of her last bone density.  - on supplements and Prolia.  - monitor electrolytes    8. Iliotibial band syndrome of left side  -     KNEE BRACE FOR HOME USE    9. Quadriceps weakness  -     KNEE BRACE FOR HOME USE    10. Palpitation  -     SCHEDULED EKG 12-LEAD (to Muse); Future  - get copies from Cardiology       Health Maintenance Due   Topic Date Due    Hepatitis C Screening  Never done    HIV Screening  Never done    Pneumococcal Vaccines (Age 65+) (2 - PPSV23 or PCV20) 07/14/2017    COVID-19 Vaccine (5 - Moderna series) 09/16/2022    Influenza Vaccine (1) 09/01/2023        I spent a total of 50 minutes on the day of the visit.This includes face to face time and non-face to face time preparing to see the patient (eg, review of tests), obtaining and/or reviewing separately obtained history, documenting clinical information in the electronic or other health record, independently interpreting results and communicating results to the patient/family/caregiver, or care coordinator.      Return to clinic in 3 months.    Vesta Peña MD  [facility]  Disclaimer:  This note has been generated using voice-recognition software. There may be grammatical or spelling errors that have been missed during proof-reading

## 2023-09-14 ENCOUNTER — LAB VISIT (OUTPATIENT)
Dept: LAB | Facility: HOSPITAL | Age: 63
End: 2023-09-14
Attending: INTERNAL MEDICINE
Payer: MEDICARE

## 2023-09-14 DIAGNOSIS — M06.9 RHEUMATOID ARTHRITIS INVOLVING MULTIPLE JOINTS: ICD-10-CM

## 2023-09-14 LAB
ALBUMIN SERPL BCP-MCNC: 4.1 G/DL (ref 3.5–5.2)
ALP SERPL-CCNC: 55 U/L (ref 55–135)
ALT SERPL W/O P-5'-P-CCNC: 25 U/L (ref 10–44)
ANION GAP SERPL CALC-SCNC: 9 MMOL/L (ref 8–16)
AST SERPL-CCNC: 23 U/L (ref 10–40)
BASOPHILS # BLD AUTO: 0.04 K/UL (ref 0–0.2)
BASOPHILS NFR BLD: 0.6 % (ref 0–1.9)
BILIRUB SERPL-MCNC: 0.4 MG/DL (ref 0.1–1)
BUN SERPL-MCNC: 13 MG/DL (ref 8–23)
CALCIUM SERPL-MCNC: 9.3 MG/DL (ref 8.7–10.5)
CHLORIDE SERPL-SCNC: 105 MMOL/L (ref 95–110)
CO2 SERPL-SCNC: 22 MMOL/L (ref 23–29)
CREAT SERPL-MCNC: 0.7 MG/DL (ref 0.5–1.4)
CRP SERPL-MCNC: <0.3 MG/L (ref 0–8.2)
DIFFERENTIAL METHOD: ABNORMAL
EOSINOPHIL # BLD AUTO: 0.2 K/UL (ref 0–0.5)
EOSINOPHIL NFR BLD: 2.5 % (ref 0–8)
ERYTHROCYTE [DISTWIDTH] IN BLOOD BY AUTOMATED COUNT: 13.9 % (ref 11.5–14.5)
ERYTHROCYTE [SEDIMENTATION RATE] IN BLOOD BY PHOTOMETRIC METHOD: 11 MM/HR (ref 0–36)
EST. GFR  (NO RACE VARIABLE): >60 ML/MIN/1.73 M^2
GLUCOSE SERPL-MCNC: 95 MG/DL (ref 70–110)
HCT VFR BLD AUTO: 38.3 % (ref 37–48.5)
HGB BLD-MCNC: 12 G/DL (ref 12–16)
IMM GRANULOCYTES # BLD AUTO: 0.01 K/UL (ref 0–0.04)
IMM GRANULOCYTES NFR BLD AUTO: 0.2 % (ref 0–0.5)
LYMPHOCYTES # BLD AUTO: 2.1 K/UL (ref 1–4.8)
LYMPHOCYTES NFR BLD: 33 % (ref 18–48)
MCH RBC QN AUTO: 30.2 PG (ref 27–31)
MCHC RBC AUTO-ENTMCNC: 31.3 G/DL (ref 32–36)
MCV RBC AUTO: 96 FL (ref 82–98)
MONOCYTES # BLD AUTO: 0.6 K/UL (ref 0.3–1)
MONOCYTES NFR BLD: 9.1 % (ref 4–15)
NEUTROPHILS # BLD AUTO: 3.6 K/UL (ref 1.8–7.7)
NEUTROPHILS NFR BLD: 54.6 % (ref 38–73)
NRBC BLD-RTO: 0 /100 WBC
PLATELET # BLD AUTO: 261 K/UL (ref 150–450)
PMV BLD AUTO: 10.6 FL (ref 9.2–12.9)
POTASSIUM SERPL-SCNC: 4.6 MMOL/L (ref 3.5–5.1)
PROT SERPL-MCNC: 7.2 G/DL (ref 6–8.4)
RBC # BLD AUTO: 3.98 M/UL (ref 4–5.4)
SODIUM SERPL-SCNC: 136 MMOL/L (ref 136–145)
WBC # BLD AUTO: 6.49 K/UL (ref 3.9–12.7)

## 2023-09-14 PROCEDURE — 86140 C-REACTIVE PROTEIN: CPT | Mod: HCNC | Performed by: INTERNAL MEDICINE

## 2023-09-14 PROCEDURE — 36415 COLL VENOUS BLD VENIPUNCTURE: CPT | Mod: HCNC,PO | Performed by: INTERNAL MEDICINE

## 2023-09-14 PROCEDURE — 80053 COMPREHEN METABOLIC PANEL: CPT | Mod: HCNC | Performed by: INTERNAL MEDICINE

## 2023-09-14 PROCEDURE — 85652 RBC SED RATE AUTOMATED: CPT | Mod: HCNC | Performed by: INTERNAL MEDICINE

## 2023-09-14 PROCEDURE — 85025 COMPLETE CBC W/AUTO DIFF WBC: CPT | Mod: HCNC | Performed by: INTERNAL MEDICINE

## 2023-09-21 ENCOUNTER — PATIENT MESSAGE (OUTPATIENT)
Dept: FAMILY MEDICINE | Facility: CLINIC | Age: 63
End: 2023-09-21
Payer: MEDICARE

## 2023-09-26 ENCOUNTER — PATIENT OUTREACH (OUTPATIENT)
Dept: ADMINISTRATIVE | Facility: HOSPITAL | Age: 63
End: 2023-09-26
Payer: MEDICARE

## 2023-09-26 NOTE — PROGRESS NOTES
Care Everywhere updates requested and reviewed.  Immunizations reconciled. Media reports reviewed.  Duplicate HM overrides and  orders removed.  Overdue HM topic chart audit and/or requested.  Overdue lab testing linked to upcoming lab appointments if applies.    Uploaded ROIs to chart    Health Maintenance Due   Topic Date Due    Hepatitis C Screening  Never done    HIV Screening  Never done    Pneumococcal Vaccines (Age 65+) (2 - PPSV23 or PCV20) 2017    COVID-19 Vaccine (5 - Moderna series) 2022    Influenza Vaccine (1) 2023

## 2023-11-22 ENCOUNTER — TELEPHONE (OUTPATIENT)
Dept: FAMILY MEDICINE | Facility: CLINIC | Age: 63
End: 2023-11-22
Payer: MEDICARE

## 2023-11-22 NOTE — TELEPHONE ENCOUNTER
"----- Message from Yen Robles sent at 11/22/2023  2:01 PM CST -----  Contact: pt  Type: Reschedule needed     Who Called: pt   Would the patient rather a call back or a response via [facility]? call  Best Call Back Number: [phone]  Additional Information:   Pt requesting a call regarding why on 01/10 her appt have a "reschedule notification" , is it related to provider new location       "

## 2023-11-22 NOTE — TELEPHONE ENCOUNTER
Called pt and told her that as of right now we do not know anything about the pts who are following dr. Peña, I told pt that we have a list I will add her to and once we find something out we will call her with a new appointment

## 2023-11-24 ENCOUNTER — PATIENT MESSAGE (OUTPATIENT)
Dept: FAMILY MEDICINE | Facility: CLINIC | Age: 63
End: 2023-11-24
Payer: MEDICARE

## 2023-11-29 DIAGNOSIS — M06.9 RHEUMATOID ARTHRITIS INVOLVING MULTIPLE JOINTS: ICD-10-CM

## 2023-11-29 RX ORDER — FOLIC ACID 1 MG/1
1 TABLET ORAL DAILY
Qty: 90 TABLET | Refills: 0 | Status: SHIPPED | OUTPATIENT
Start: 2023-11-29 | End: 2024-11-28

## 2023-12-06 DIAGNOSIS — M06.9 RHEUMATOID ARTHRITIS INVOLVING MULTIPLE JOINTS: Primary | ICD-10-CM

## 2023-12-07 RX ORDER — ETANERCEPT 50 MG/ML
50 SOLUTION SUBCUTANEOUS
Qty: 4 ML | Refills: 11 | Status: ACTIVE | OUTPATIENT
Start: 2023-12-07 | End: 2024-12-06

## 2023-12-19 ENCOUNTER — LAB VISIT (OUTPATIENT)
Dept: LAB | Facility: HOSPITAL | Age: 63
End: 2023-12-19
Attending: INTERNAL MEDICINE
Payer: MEDICARE

## 2023-12-19 DIAGNOSIS — M06.9 RHEUMATOID ARTHRITIS INVOLVING MULTIPLE JOINTS: ICD-10-CM

## 2023-12-19 LAB
ALBUMIN SERPL BCP-MCNC: 4 G/DL (ref 3.5–5.2)
ALP SERPL-CCNC: 78 U/L (ref 55–135)
ALT SERPL W/O P-5'-P-CCNC: 29 U/L (ref 10–44)
ANION GAP SERPL CALC-SCNC: 5 MMOL/L (ref 8–16)
ANISOCYTOSIS BLD QL SMEAR: SLIGHT
AST SERPL-CCNC: 25 U/L (ref 10–40)
BASOPHILS # BLD AUTO: 0.04 K/UL (ref 0–0.2)
BASOPHILS NFR BLD: 0.7 % (ref 0–1.9)
BILIRUB SERPL-MCNC: 0.3 MG/DL (ref 0.1–1)
BUN SERPL-MCNC: 18 MG/DL (ref 8–23)
CALCIUM SERPL-MCNC: 9.3 MG/DL (ref 8.7–10.5)
CHLORIDE SERPL-SCNC: 106 MMOL/L (ref 95–110)
CO2 SERPL-SCNC: 24 MMOL/L (ref 23–29)
CREAT SERPL-MCNC: 0.8 MG/DL (ref 0.5–1.4)
CRP SERPL-MCNC: 1.4 MG/L (ref 0–8.2)
DACRYOCYTES BLD QL SMEAR: ABNORMAL
DIFFERENTIAL METHOD: ABNORMAL
EOSINOPHIL # BLD AUTO: 0.1 K/UL (ref 0–0.5)
EOSINOPHIL NFR BLD: 2.1 % (ref 0–8)
ERYTHROCYTE [DISTWIDTH] IN BLOOD BY AUTOMATED COUNT: 14.3 % (ref 11.5–14.5)
ERYTHROCYTE [SEDIMENTATION RATE] IN BLOOD BY PHOTOMETRIC METHOD: 19 MM/HR (ref 0–36)
EST. GFR  (NO RACE VARIABLE): >60 ML/MIN/1.73 M^2
GLUCOSE SERPL-MCNC: 99 MG/DL (ref 70–110)
HCT VFR BLD AUTO: 36.4 % (ref 37–48.5)
HGB BLD-MCNC: 12.3 G/DL (ref 12–16)
IMM GRANULOCYTES # BLD AUTO: 0.02 K/UL (ref 0–0.04)
IMM GRANULOCYTES NFR BLD AUTO: 0.3 % (ref 0–0.5)
LYMPHOCYTES # BLD AUTO: 1.8 K/UL (ref 1–4.8)
LYMPHOCYTES NFR BLD: 31.4 % (ref 18–48)
MCH RBC QN AUTO: 30.5 PG (ref 27–31)
MCHC RBC AUTO-ENTMCNC: 33.8 G/DL (ref 32–36)
MCV RBC AUTO: 90 FL (ref 82–98)
MONOCYTES # BLD AUTO: 0.5 K/UL (ref 0.3–1)
MONOCYTES NFR BLD: 8.7 % (ref 4–15)
NEUTROPHILS # BLD AUTO: 3.3 K/UL (ref 1.8–7.7)
NEUTROPHILS NFR BLD: 56.8 % (ref 38–73)
NRBC BLD-RTO: 0 /100 WBC
PLATELET # BLD AUTO: 219 K/UL (ref 150–450)
PLATELET BLD QL SMEAR: ABNORMAL
PMV BLD AUTO: 10.1 FL (ref 9.2–12.9)
POIKILOCYTOSIS BLD QL SMEAR: SLIGHT
POLYCHROMASIA BLD QL SMEAR: ABNORMAL
POTASSIUM SERPL-SCNC: 4.9 MMOL/L (ref 3.5–5.1)
PROT SERPL-MCNC: 7.3 G/DL (ref 6–8.4)
RBC # BLD AUTO: 4.03 M/UL (ref 4–5.4)
SODIUM SERPL-SCNC: 135 MMOL/L (ref 136–145)
WBC # BLD AUTO: 5.74 K/UL (ref 3.9–12.7)

## 2023-12-19 PROCEDURE — 80053 COMPREHEN METABOLIC PANEL: CPT | Mod: HCNC | Performed by: INTERNAL MEDICINE

## 2023-12-19 PROCEDURE — 85652 RBC SED RATE AUTOMATED: CPT | Mod: HCNC | Performed by: INTERNAL MEDICINE

## 2023-12-19 PROCEDURE — 36415 COLL VENOUS BLD VENIPUNCTURE: CPT | Mod: HCNC,PO | Performed by: INTERNAL MEDICINE

## 2023-12-19 PROCEDURE — 85025 COMPLETE CBC W/AUTO DIFF WBC: CPT | Mod: HCNC | Performed by: INTERNAL MEDICINE

## 2023-12-19 PROCEDURE — 86140 C-REACTIVE PROTEIN: CPT | Mod: HCNC | Performed by: INTERNAL MEDICINE

## 2023-12-20 DIAGNOSIS — M06.9 RHEUMATOID ARTHRITIS INVOLVING MULTIPLE JOINTS: Primary | ICD-10-CM

## 2023-12-20 DIAGNOSIS — F51.01 PRIMARY INSOMNIA: ICD-10-CM

## 2023-12-20 RX ORDER — TRAZODONE HYDROCHLORIDE 50 MG/1
75 TABLET ORAL
Qty: 135 TABLET | Refills: 2 | Status: SHIPPED | OUTPATIENT
Start: 2023-12-20 | End: 2024-01-23 | Stop reason: SDUPTHER

## 2023-12-20 NOTE — TELEPHONE ENCOUNTER
Care Due:                  Date            Visit Type   Department     Provider  --------------------------------------------------------------------------------                                EP -                              PRIMARY      [facility]  Last Visit: 09-      CARE ([facility])   MEDICINE       Vesta Peña  Next Visit: None Scheduled  None         None Found                                                            Last  Test          Frequency    Reason                     Performed    Due Date  --------------------------------------------------------------------------------    Lipid Panel.  12 months..  atorvastatin.............  03-   03-    Health [facility] Embedded Care Due Messages. Reference number: [id number].   12/20/2023 4:48:06 PM CST

## 2023-12-21 NOTE — TELEPHONE ENCOUNTER
Provider Staff:  Action required for this patient    Requires labs      Please see care gap opportunities below in Care Due Message.    Thanks!  [facility]     Appointments      Date Provider   Last Visit   9/12/2023 Vesta Peña MD   Next Visit   Visit date not found Vesta Peña MD     Refill Decision Note   Earnestine Petit  is requesting a refill authorization.  Brief Assessment and Rationale for Refill:  Approve     Medication Therapy Plan:         Comments:     Note composed:6:17 PM 12/20/2023

## 2024-01-01 ENCOUNTER — PATIENT MESSAGE (OUTPATIENT)
Dept: ADMINISTRATIVE | Facility: OTHER | Age: 64
End: 2024-01-01
Payer: MEDICARE

## 2024-01-11 ENCOUNTER — LAB VISIT (OUTPATIENT)
Dept: LAB | Facility: HOSPITAL | Age: 64
End: 2024-01-11
Attending: INTERNAL MEDICINE
Payer: MEDICARE

## 2024-01-11 DIAGNOSIS — M81.0 OSTEOPOROSIS WITHOUT CURRENT PATHOLOGICAL FRACTURE, UNSPECIFIED OSTEOPOROSIS TYPE: ICD-10-CM

## 2024-01-11 LAB
ALBUMIN SERPL BCP-MCNC: 4.1 G/DL (ref 3.5–5.2)
ALP SERPL-CCNC: 65 U/L (ref 55–135)
ALT SERPL W/O P-5'-P-CCNC: 30 U/L (ref 10–44)
ANION GAP SERPL CALC-SCNC: 4 MMOL/L (ref 8–16)
AST SERPL-CCNC: 27 U/L (ref 10–40)
BILIRUB SERPL-MCNC: 0.5 MG/DL (ref 0.1–1)
BUN SERPL-MCNC: 14 MG/DL (ref 8–23)
CALCIUM SERPL-MCNC: 9.4 MG/DL (ref 8.7–10.5)
CHLORIDE SERPL-SCNC: 106 MMOL/L (ref 95–110)
CO2 SERPL-SCNC: 25 MMOL/L (ref 23–29)
CREAT SERPL-MCNC: 0.7 MG/DL (ref 0.5–1.4)
EST. GFR  (NO RACE VARIABLE): >60 ML/MIN/1.73 M^2
GLUCOSE SERPL-MCNC: 105 MG/DL (ref 70–110)
POTASSIUM SERPL-SCNC: 4.4 MMOL/L (ref 3.5–5.1)
PROT SERPL-MCNC: 7.4 G/DL (ref 6–8.4)
PTH-INTACT SERPL-MCNC: 32.4 PG/ML (ref 9–77)
SODIUM SERPL-SCNC: 135 MMOL/L (ref 136–145)

## 2024-01-11 PROCEDURE — 36415 COLL VENOUS BLD VENIPUNCTURE: CPT | Mod: PO | Performed by: INTERNAL MEDICINE

## 2024-01-11 PROCEDURE — 80053 COMPREHEN METABOLIC PANEL: CPT | Performed by: INTERNAL MEDICINE

## 2024-01-11 PROCEDURE — 83970 ASSAY OF PARATHORMONE: CPT | Performed by: INTERNAL MEDICINE

## 2024-01-23 ENCOUNTER — OFFICE VISIT (OUTPATIENT)
Dept: PRIMARY CARE CLINIC | Facility: CLINIC | Age: 64
End: 2024-01-23
Payer: MEDICARE

## 2024-01-23 VITALS
SYSTOLIC BLOOD PRESSURE: 124 MMHG | HEIGHT: 64 IN | OXYGEN SATURATION: 97 % | BODY MASS INDEX: 26.56 KG/M2 | WEIGHT: 155.56 LBS | HEART RATE: 76 BPM | DIASTOLIC BLOOD PRESSURE: 84 MMHG

## 2024-01-23 DIAGNOSIS — J30.9 ALLERGIC RHINITIS, UNSPECIFIED SEASONALITY, UNSPECIFIED TRIGGER: ICD-10-CM

## 2024-01-23 DIAGNOSIS — D84.821 DRUG-INDUCED IMMUNODEFICIENCY: ICD-10-CM

## 2024-01-23 DIAGNOSIS — M06.9 RHEUMATOID ARTHRITIS, INVOLVING UNSPECIFIED SITE, UNSPECIFIED WHETHER RHEUMATOID FACTOR PRESENT: ICD-10-CM

## 2024-01-23 DIAGNOSIS — F41.9 ANXIETY: ICD-10-CM

## 2024-01-23 DIAGNOSIS — Z79.899 DRUG-INDUCED IMMUNODEFICIENCY: ICD-10-CM

## 2024-01-23 DIAGNOSIS — R05.2 SUBACUTE COUGH: ICD-10-CM

## 2024-01-23 DIAGNOSIS — I10 HYPERTENSION, UNSPECIFIED TYPE: Primary | ICD-10-CM

## 2024-01-23 DIAGNOSIS — R79.9 ABNORMAL BLOOD CHEMISTRY: ICD-10-CM

## 2024-01-23 DIAGNOSIS — E78.5 HYPERLIPIDEMIA, UNSPECIFIED HYPERLIPIDEMIA TYPE: ICD-10-CM

## 2024-01-23 DIAGNOSIS — E87.1 HYPONATREMIA: ICD-10-CM

## 2024-01-23 DIAGNOSIS — F51.01 PRIMARY INSOMNIA: ICD-10-CM

## 2024-01-23 DIAGNOSIS — M81.0 OSTEOPOROSIS WITHOUT PATHOLOGICAL FRACTURE: ICD-10-CM

## 2024-01-23 PROCEDURE — 3079F DIAST BP 80-89 MM HG: CPT | Mod: CPTII,S$GLB,, | Performed by: INTERNAL MEDICINE

## 2024-01-23 PROCEDURE — 3074F SYST BP LT 130 MM HG: CPT | Mod: CPTII,S$GLB,, | Performed by: INTERNAL MEDICINE

## 2024-01-23 PROCEDURE — 99999 PR PBB SHADOW E&M-EST. PATIENT-LVL V: CPT | Mod: PBBFAC,,, | Performed by: INTERNAL MEDICINE

## 2024-01-23 PROCEDURE — 1160F RVW MEDS BY RX/DR IN RCRD: CPT | Mod: CPTII,S$GLB,, | Performed by: INTERNAL MEDICINE

## 2024-01-23 PROCEDURE — 99215 OFFICE O/P EST HI 40 MIN: CPT | Mod: S$GLB,,, | Performed by: INTERNAL MEDICINE

## 2024-01-23 PROCEDURE — 3008F BODY MASS INDEX DOCD: CPT | Mod: CPTII,S$GLB,, | Performed by: INTERNAL MEDICINE

## 2024-01-23 PROCEDURE — 4010F ACE/ARB THERAPY RXD/TAKEN: CPT | Mod: CPTII,S$GLB,, | Performed by: INTERNAL MEDICINE

## 2024-01-23 PROCEDURE — 1126F AMNT PAIN NOTED NONE PRSNT: CPT | Mod: CPTII,S$GLB,, | Performed by: INTERNAL MEDICINE

## 2024-01-23 PROCEDURE — 1159F MED LIST DOCD IN RCRD: CPT | Mod: CPTII,S$GLB,, | Performed by: INTERNAL MEDICINE

## 2024-01-23 PROCEDURE — 1101F PT FALLS ASSESS-DOCD LE1/YR: CPT | Mod: CPTII,S$GLB,, | Performed by: INTERNAL MEDICINE

## 2024-01-23 PROCEDURE — 3288F FALL RISK ASSESSMENT DOCD: CPT | Mod: CPTII,S$GLB,, | Performed by: INTERNAL MEDICINE

## 2024-01-23 RX ORDER — METHYLPREDNISOLONE 4 MG/1
TABLET ORAL
Qty: 21 EACH | Refills: 0 | Status: SHIPPED | OUTPATIENT
Start: 2024-01-23 | End: 2024-02-13

## 2024-01-23 RX ORDER — AZELASTINE 1 MG/ML
2 SPRAY, METERED NASAL 2 TIMES DAILY
Qty: 30 ML | Refills: 3 | Status: SHIPPED | OUTPATIENT
Start: 2024-01-23 | End: 2025-01-22

## 2024-01-23 RX ORDER — TRAZODONE HYDROCHLORIDE 50 MG/1
75 TABLET ORAL NIGHTLY
Qty: 135 TABLET | Refills: 2 | Status: SHIPPED | OUTPATIENT
Start: 2024-01-23

## 2024-01-23 NOTE — PROGRESS NOTES
Subjective:       Patient ID: Earnestine Petit is a 63 y.o. female.    Chief Complaint: Hypertension      HPI  Earnestine Petit is a 63 y.o. female with rheumatoid arthritis,hypertension, hyperlipidemia, osteoarthritis, insomnia, anxiety, osteoporosis who presents today for Hypertension    Reports has been having a cough for close to 2 months.  Around November had increased nasal congestion, cough, green mucus, and headache.  She was prescribed a Z-Andrews at the end of November with some improvement of her symptoms.  Continue with sinus pressure and congestion with persistent cough.  Not feeling well.  Amoxicillin was given which helped much better with improve your sputum and pressure.  Still has significant nasal congestion despite Claritin D, Mucinex DM, and Flonase.  Medications helps some but not completely gone.  She did checked for COVID which was negative.  There is no chest pains, shortness of breath, or leg swelling with the symptoms.  The symptoms seem to be worse at night and is usually upper respiratory tract.    Hypertension- has been controlled on lisinopril.  No Chest pain, palpitations, or lightheadedness.  Her headaches are probably more sinus related.  Take Claritin D 12 hour, blood pressure has been controlled but tends to cause trouble sleeping.    Hyperlipidemia-on atorvastatin 20 mg daily.  Last labs with LDL less than 90.    Rheumatoid arthritis/OA -followed by Rheumatology, on and brown and methotrexate.  Of concern decreasing anion gap and sodium.  Repeat labs planned.  There is no acidosis, normal kidney function, and WBCs.  There was a mild decrease in hematocrit but normal hemoglobin.  Noted hyponatremia, drinks water frequently and usually eats twice a day.    Osteoporosis-in the past treated with Fosamax, now receiving Prolia and supplements.  Followed by gynecologist and previously by nephrologist.  Last year Bone density test not available.  Apparently feel with osteoporosis, on Prolia and  supplements.  Rheumatology ordered PTH which was normal.  Not on chronic steroids.    Insomnia- taking trazodone 75 mg to help her sleep.    Mammogram done on 02/23/2023  DEXA scan on 02/23/23  Colonoscopy done on 02/08/2022, repeat in 10 years  Vaccines-due for pneumonia,RSV, and COVID vaccines.    ACP- reports she has living will and will bring copy.      Health Maintenance:  Health Maintenance   Topic Date Due    Hepatitis C Screening  Never done    Mammogram  02/23/2024    High Dose Statin  01/23/2025    DEXA Scan  02/23/2026    TETANUS VACCINE  05/02/2027    Lipid Panel  03/10/2028    Colorectal Cancer Screening  02/08/2032    Shingles Vaccine  Completed       Review of Systems   Constitutional:  Negative for fever.   HENT:  Positive for nasal congestion and rhinorrhea.    Respiratory:  Positive for cough. Negative for shortness of breath.    Cardiovascular: Negative.    Musculoskeletal:  Positive for arthralgias (shoulders hurt occasionally, right foot pain and swelling relieved with ibuprofen).   Neurological:  Positive for headaches.   Psychiatric/Behavioral:  Positive for sleep disturbance (trazodone helps). The patient is nervous/anxious.       Past Medical History:   Diagnosis Date    Allergy     Arthritis     RH    Hyperlipidemia     Hypertension     Osteoporosis     Palpitations        Past Surgical History:   Procedure Laterality Date    ADENOIDECTOMY      TONSILLECTOMY         Family History   Problem Relation Age of Onset    Diabetes Mother     Cancer Father         Stomach or pancreas    Heart disease Father     Hyperlipidemia Sister     Hypertension Sister     Heart disease Sister     Rheum arthritis Sister     Cancer Brother     Diabetes Maternal Grandmother     Heart disease Maternal Grandfather     Allergies Daughter     Asthma Neg Hx        Social History     Socioeconomic History    Marital status:    Tobacco Use    Smoking status: Never     Passive exposure: Never    Smokeless tobacco:  Never   Substance and Sexual Activity    Alcohol use: Yes     Alcohol/week: 7.0 standard drinks of alcohol     Types: 7 Glasses of wine per week     Comment: A glass of wine daily       Current Outpatient Medications   Medication Sig Dispense Refill    atorvastatin (LIPITOR) 20 MG tablet Take 1 tablet (20 mg total) by mouth every evening. 90 tablet 3    biotin 10,000 mcg Cap Take 1 capsule by mouth.      calcium carbonate (OS-ALIYAH) 600 mg calcium (1,500 mg) Tab Take 1,200 mg by mouth once daily at 6am.      cetirizine (ZYRTEC) 10 MG tablet Take 10 mg by mouth once daily.      cholecalciferol, vitamin D3, (VITAMIN D3) 50 mcg (2,000 unit) Cap Take 1 tablet by mouth.      denosumab (PROLIA) 60 mg/mL Syrg Inject 60 mg into the skin.      etanercept (ENBREL SURECLICK) 50 mg/mL (1 mL) Inject 1 mL (50 mg total) into the skin every 7 days. 4 mL 11    ferrous fumarate/vit Bcomp,C (SUPER B COMPLEX ORAL) Take 1 tablet by mouth.      FLOWFLEX COVID-19 AG HOME TEST Kit USE AS DIRECTED ON PACKAGE INSERT.      fluticasone propionate (FLONASE) 50 mcg/actuation nasal spray 2 sprays (100 mcg total) by Each Nostril route once daily. 16 g 12    folic acid (FOLVITE) 1 MG tablet TAKE 1 TABLET (1 MG TOTAL) BY MOUTH ONCE DAILY. 90 tablet 0    glucosamine HCl 1,500 mg Tab Take 1 tablet by mouth.      Lactobac no.41/Bifidobact no.7 (PROBIOTIC-10 ORAL) Take 1 capsule by mouth.      latanoprost 0.005 % ophthalmic solution PLACE ONE DROP IN EACH EYE AT BEDTIME      lisinopriL (PRINIVIL,ZESTRIL) 5 MG tablet Take 1 tablet (5 mg total) by mouth once daily. 90 tablet 3    melatonin 5 mg Cap Take 1 capsule by mouth every evening.      methotrexate 2.5 MG Tab Take 5 tablets (12.5 mg total) by mouth every 7 days. 60 tablet 3    metoprolol tartrate (LOPRESSOR) 25 MG tablet Take 1 tablet (25 mg total) by mouth nightly. 90 tablet 3    ondansetron (ZOFRAN-ODT) 8 MG TbDL DISSOLVE 1 TABLET BY MOUTH EVERY EIGHT HOURS  AS NEEDED FOR NAUSEA AND VOMOTING       "azelastine (ASTELIN) 137 mcg (0.1 %) nasal spray 2 sprays (274 mcg total) by Nasal route 2 (two) times daily. 30 mL 3    methylPREDNISolone (MEDROL DOSEPACK) 4 mg tablet use as directed 21 each 0    traZODone (DESYREL) 50 MG tablet Take 1.5 tablets (75 mg total) by mouth every evening. 135 tablet 2     No current facility-administered medications for this visit.       Review of patient's allergies indicates:  No Known Allergies      Objective:       Last 3 sets of Vitals        3/22/2023     8:50 AM 9/12/2023     9:50 AM 1/23/2024    11:14 AM   Vitals - 1 value per visit   SYSTOLIC 130 128 124   DIASTOLIC 84 82 84   Pulse 66 68 76   SPO2  95 % 97 %   Weight (lb) 152.78 153.44 155.53   Weight (kg) 69.3 69.6 70.55   Height 5' 4" (1.626 m) 5' 4" (1.626 m) 5' 4" (1.626 m)   BMI (Calculated) 26.2 26.3 26.7   Pain Score Zero Zero Zero   Physical Exam  Constitutional:       General: She is not in acute distress.  HENT:      Head: Normocephalic.      Right Ear: Tympanic membrane, ear canal and external ear normal.      Left Ear: Tympanic membrane, ear canal and external ear normal.      Nose: Nose normal.      Mouth/Throat:      Mouth: Mucous membranes are moist.   Eyes:      General: No scleral icterus.     Extraocular Movements: Extraocular movements intact.      Conjunctiva/sclera: Conjunctivae normal.   Neck:      Vascular: No carotid bruit.      Comments: No goiter.  Cardiovascular:      Rate and Rhythm: Normal rate and regular rhythm.      Pulses: Normal pulses.      Heart sounds: Normal heart sounds.   Pulmonary:      Effort: Pulmonary effort is normal.      Breath sounds: Normal breath sounds.   Abdominal:      General: Bowel sounds are normal. There is no distension.      Palpations: Abdomen is soft. There is no mass.      Tenderness: There is no abdominal tenderness.   Musculoskeletal:         General: No swelling.   Lymphadenopathy:      Cervical: No cervical adenopathy.   Skin:     General: Skin is warm and dry. "   Neurological:      General: No focal deficit present.      Mental Status: She is alert and oriented to person, place, and time.   Psychiatric:         Mood and Affect: Mood normal.         Behavior: Behavior normal.           CBC:  Recent Labs   Lab 06/14/23  1120 09/14/23  0943 12/19/23  1052   WBC 8.72 6.49 5.74   RBC 4.31 3.98 L 4.03   Hemoglobin 13.1 12.0 12.3   Hematocrit 40.4 38.3 36.4 L   Platelets 240 261 219   MCV 94 96 90   MCH 30.4 30.2 30.5   MCHC 32.4 31.3 L 33.8     CMP:  Recent Labs   Lab 09/14/23  0943 12/19/23  1052 01/11/24  1003   Glucose 95 99 105   Calcium 9.3 9.3 9.4   Albumin 4.1 4.0 4.1   Total Protein 7.2 7.3 7.4   Sodium 136 135 L 135 L   Potassium 4.6 4.9 4.4   CO2 22 L 24 25   Chloride 105 106 106   BUN 13 18 14   Creatinine 0.7 0.8 0.7   Alkaline Phosphatase 55 78 65   ALT 25 29 30   AST 23 25 27   Total Bilirubin 0.4 0.3 0.5     URINALYSIS:  Recent Labs   Lab 09/13/22  1127   Color, UA Colorless A   Specific Gravity, UA <1.005 A   pH, UA 7.0   Protein, UA Negative   Nitrite, UA Negative   Leukocytes, UA Negative   Urobilinogen, UA Negative      LIPIDS:  Recent Labs   Lab 07/06/22  0917 03/10/23  0825   TSH 3.01 3.056   HDL 55 69   Cholesterol 152 173   Triglycerides 122 79   LDL Cholesterol 77 88.2   HDL/Cholesterol Ratio 2.8 39.9   Non-HDL Cholesterol  --  104   Non HDL Chol. (LDL+VLDL) 97  --    Total Cholesterol/HDL Ratio  --  2.5     TSH:  Recent Labs   Lab 07/06/22  0917 03/10/23  0825   TSH 3.01 3.056       A1C:  Recent Labs   Lab 07/06/22  0917 03/10/23  0825   Hemoglobin A1C 5.4 5.3       Imaging:  X-Ray Knee Complete 4 or More Views Left  Narrative: EXAMINATION:  XR KNEE COMP 4 OR MORE VIEWS LEFT    CLINICAL HISTORY:  Pain in unspecified knee    TECHNIQUE:  Left knee four views    COMPARISON:  None    FINDINGS:  Mild DJD.  The medial tibiofemoral joint space is narrowed.  No fracture or dislocation.  No bone destruction identified  Impression: See above    Electronically  signed by: Zak Vásquez MD  Date:    03/22/2023  Time:    09:19      Assessment:       1. Hypertension, unspecified type    2. Abnormal blood chemistry    3. Hyponatremia    4. Allergic rhinitis, unspecified seasonality, unspecified trigger    5. Subacute cough    6. Rheumatoid arthritis, involving unspecified site, unspecified whether rheumatoid factor present    7. Drug-induced immunodeficiency    8. Osteoporosis without pathological fracture    9. Hyperlipidemia, unspecified hyperlipidemia type    10. Primary insomnia    11. Anxiety            Plan:       1. Hypertension, unspecified type  Assessment & Plan:  Controlled on lisinopril and metoprolol  Continue to monitor      2. Abnormal blood chemistry  Comments:  Noted with mildly decreased sodium, low anion gap, and mildly decreased hematocrit.  Proteins, lipds, glucose, and TSH are normal.  Possibly medications.  Orders:  -     URIC ACID; Future; Expected date: 01/23/2024  -     Urinalysis, Reflex to Urine Culture Urine, Clean Catch; Future; Expected date: 01/23/2024  -     Sodium, urine, random    3. Hyponatremia  Comments:  Mild decrease. Serum osmolality normal (on low side), now acidosis.  Orders:  -     Urinalysis, Reflex to Urine Culture Urine, Clean Catch; Future; Expected date: 01/23/2024  -     X-Ray Chest PA And Lateral; Future; Expected date: 01/23/2024  -     Sodium, urine, random  -     Osmolality, urine    4. Allergic rhinitis, unspecified seasonality, unspecified trigger  Assessment & Plan:  On Claritin D, Flonase common Mucinex DM with persistent of symptoms.  Give trial of steroids.    Orders:  -     azelastine (ASTELIN) 137 mcg (0.1 %) nasal spray; 2 sprays (274 mcg total) by Nasal route 2 (two) times daily.  Dispense: 30 mL; Refill: 3  -     methylPREDNISolone (MEDROL DOSEPACK) 4 mg tablet; use as directed  Dispense: 21 each; Refill: 0    5. Subacute cough  Comments:  Treated with antibiotics, Mucinex, Claritin D, with persistent symptoms.   We will give steroids as symptoms have been significant.  Check chest cxr.  Orders:  -     methylPREDNISolone (MEDROL DOSEPACK) 4 mg tablet; use as directed  Dispense: 21 each; Refill: 0  -     CBC Auto Differential; Future; Expected date: 01/23/2024  -     X-Ray Chest PA And Lateral; Future; Expected date: 01/23/2024    6. Rheumatoid arthritis, involving unspecified site, unspecified whether rheumatoid factor present  Assessment & Plan:  Followed by Rheumatology.    Treated with Enbrel and methotrexate.  Using occasional anti-inflammatories.  No active synovitis.  Follow consultant's recommendations.    Orders:  -     URIC ACID; Future; Expected date: 01/23/2024    7. Drug-induced immunodeficiency  Assessment & Plan:  On treatment for rheumatoid arthritis.  Management per rheumatology  Updated vaccination recommended, especially respiratory pathogens.    Orders:  -     CBC Auto Differential; Future; Expected date: 01/23/2024    8. Osteoporosis without pathological fracture  Assessment & Plan:  Last bone density test not available.  Will get copies.   She is on Prolia and supplements.  Followed by gynecology.  Consider endocrinology evaluation if osteoporosis is worsening.      9. Hyperlipidemia, unspecified hyperlipidemia type    10. Primary insomnia  Assessment & Plan:  Sleeping well with 75 mg of trazodone.  Sodium worsens will consider decreasing the medication or changing.  Continue present treatment.    Orders:  -     traZODone (DESYREL) 50 MG tablet; Take 1.5 tablets (75 mg total) by mouth every evening.  Dispense: 135 tablet; Refill: 2    11. Anxiety  Comments:  Trazodone seems to help.  Does not feel need of further treatment at this time.  Watch increased anxiety with steroids but has tolerated them in the past.     Patient will wait to start steroids until labs collected  Health Maintenance Due   Topic Date Due    Hepatitis C Screening  Never done    HIV Screening  Never done    Pneumococcal Vaccines (Age  65+) (2 - PPSV23 or PCV20) 07/14/2017    RSV Vaccine (Age 60+ and Pregnant patients) (1 - 1-dose 60+ series) Never done    COVID-19 Vaccine (5 - 2023-24 season) 09/01/2023    Mammogram  02/23/2024            Return to clinic in 3 weeks.    Vesta Peña MD  [facility]  Disclaimer:  This note has been generated using voice-recognition software. There may be grammatical or spelling errors that have been missed during proof-reading

## 2024-01-24 ENCOUNTER — HOSPITAL ENCOUNTER (OUTPATIENT)
Dept: RADIOLOGY | Facility: HOSPITAL | Age: 64
Discharge: HOME | End: 2024-01-24
Attending: INTERNAL MEDICINE
Payer: MEDICARE

## 2024-01-24 ENCOUNTER — PATIENT MESSAGE (OUTPATIENT)
Dept: PRIMARY CARE CLINIC | Facility: CLINIC | Age: 64
End: 2024-01-24
Payer: MEDICARE

## 2024-01-24 DIAGNOSIS — R05.2 SUBACUTE COUGH: ICD-10-CM

## 2024-01-24 DIAGNOSIS — E87.1 HYPONATREMIA: ICD-10-CM

## 2024-01-24 PROCEDURE — 71046 X-RAY EXAM CHEST 2 VIEWS: CPT | Mod: 26,,, | Performed by: RADIOLOGY

## 2024-01-24 PROCEDURE — 71046 X-RAY EXAM CHEST 2 VIEWS: CPT | Mod: TC,FY

## 2024-01-24 NOTE — ASSESSMENT & PLAN NOTE
Sleeping well with 75 mg of trazodone.  Sodium worsens will consider decreasing the medication or changing.  Continue present treatment.

## 2024-01-24 NOTE — ASSESSMENT & PLAN NOTE
On treatment for rheumatoid arthritis.  Management per rheumatology  Updated vaccination recommended, especially respiratory pathogens.

## 2024-01-24 NOTE — ASSESSMENT & PLAN NOTE
Last bone density test not available.  Will get copies.   She is on Prolia and supplements.  Followed by gynecology.  Consider endocrinology evaluation if osteoporosis is worsening.

## 2024-01-24 NOTE — ASSESSMENT & PLAN NOTE
Followed by Rheumatology.    Treated with Enbrel and methotrexate.  Using occasional anti-inflammatories.  No active synovitis.  Follow consultant's recommendations.

## 2024-01-25 ENCOUNTER — PATIENT MESSAGE (OUTPATIENT)
Dept: PRIMARY CARE CLINIC | Facility: CLINIC | Age: 64
End: 2024-01-25
Payer: MEDICARE

## 2024-01-26 ENCOUNTER — TELEPHONE (OUTPATIENT)
Dept: PRIMARY CARE CLINIC | Facility: CLINIC | Age: 64
End: 2024-01-26
Payer: MEDICARE

## 2024-01-26 NOTE — TELEPHONE ENCOUNTER
The patient was called with updates.  Labs discussed with Rheumatology.  Low uric acid can be observed and not felt to be significant.  Anemic could be due to methotrexate.  On repeat CBC no teardrop cells were noted and will continue to monitor with labs done every 3 months.  No changes to treatment needed.

## 2024-01-29 ENCOUNTER — PATIENT MESSAGE (OUTPATIENT)
Dept: ADMINISTRATIVE | Facility: OTHER | Age: 64
End: 2024-01-29
Payer: MEDICARE

## 2024-02-14 ENCOUNTER — OFFICE VISIT (OUTPATIENT)
Dept: PRIMARY CARE CLINIC | Facility: CLINIC | Age: 64
End: 2024-02-14
Payer: MEDICARE

## 2024-02-14 VITALS
SYSTOLIC BLOOD PRESSURE: 114 MMHG | HEART RATE: 67 BPM | OXYGEN SATURATION: 95 % | BODY MASS INDEX: 26.72 KG/M2 | DIASTOLIC BLOOD PRESSURE: 62 MMHG | HEIGHT: 64 IN | WEIGHT: 156.5 LBS

## 2024-02-14 DIAGNOSIS — E78.5 HYPERLIPIDEMIA, UNSPECIFIED HYPERLIPIDEMIA TYPE: ICD-10-CM

## 2024-02-14 DIAGNOSIS — F41.9 ANXIETY: ICD-10-CM

## 2024-02-14 DIAGNOSIS — R00.2 PALPITATIONS: ICD-10-CM

## 2024-02-14 DIAGNOSIS — I10 HYPERTENSION, UNSPECIFIED TYPE: ICD-10-CM

## 2024-02-14 DIAGNOSIS — R79.9 ABNORMAL BLOOD CHEMISTRY: ICD-10-CM

## 2024-02-14 DIAGNOSIS — M81.0 OSTEOPOROSIS WITHOUT PATHOLOGICAL FRACTURE: ICD-10-CM

## 2024-02-14 DIAGNOSIS — J30.9 ALLERGIC RHINITIS, UNSPECIFIED SEASONALITY, UNSPECIFIED TRIGGER: ICD-10-CM

## 2024-02-14 DIAGNOSIS — Z00.00 ENCOUNTER FOR WELLNESS EXAMINATION IN ADULT: Primary | ICD-10-CM

## 2024-02-14 DIAGNOSIS — E87.1 HYPONATREMIA: ICD-10-CM

## 2024-02-14 PROCEDURE — 99215 OFFICE O/P EST HI 40 MIN: CPT | Mod: S$GLB,,, | Performed by: INTERNAL MEDICINE

## 2024-02-14 PROCEDURE — 1126F AMNT PAIN NOTED NONE PRSNT: CPT | Mod: CPTII,S$GLB,, | Performed by: INTERNAL MEDICINE

## 2024-02-14 PROCEDURE — 1159F MED LIST DOCD IN RCRD: CPT | Mod: CPTII,S$GLB,, | Performed by: INTERNAL MEDICINE

## 2024-02-14 PROCEDURE — 4010F ACE/ARB THERAPY RXD/TAKEN: CPT | Mod: CPTII,S$GLB,, | Performed by: INTERNAL MEDICINE

## 2024-02-14 PROCEDURE — 1101F PT FALLS ASSESS-DOCD LE1/YR: CPT | Mod: CPTII,S$GLB,, | Performed by: INTERNAL MEDICINE

## 2024-02-14 PROCEDURE — 1123F ACP DISCUSS/DSCN MKR DOCD: CPT | Mod: CPTII,S$GLB,, | Performed by: INTERNAL MEDICINE

## 2024-02-14 PROCEDURE — 3074F SYST BP LT 130 MM HG: CPT | Mod: CPTII,S$GLB,, | Performed by: INTERNAL MEDICINE

## 2024-02-14 PROCEDURE — 3288F FALL RISK ASSESSMENT DOCD: CPT | Mod: CPTII,S$GLB,, | Performed by: INTERNAL MEDICINE

## 2024-02-14 PROCEDURE — 3008F BODY MASS INDEX DOCD: CPT | Mod: CPTII,S$GLB,, | Performed by: INTERNAL MEDICINE

## 2024-02-14 PROCEDURE — 1160F RVW MEDS BY RX/DR IN RCRD: CPT | Mod: CPTII,S$GLB,, | Performed by: INTERNAL MEDICINE

## 2024-02-14 PROCEDURE — 99999 PR PBB SHADOW E&M-EST. PATIENT-LVL V: CPT | Mod: PBBFAC,,, | Performed by: INTERNAL MEDICINE

## 2024-02-14 PROCEDURE — 3078F DIAST BP <80 MM HG: CPT | Mod: CPTII,S$GLB,, | Performed by: INTERNAL MEDICINE

## 2024-02-14 NOTE — ASSESSMENT & PLAN NOTE
Reports anxiety on an off.  On trazodone to help her sleep.  Will like to avoid medications if possible.  Evaluation by our LCSW offered.  She will try to decrease caffeinated drinks and feels as recent stressors improved she will feel better.

## 2024-02-14 NOTE — ASSESSMENT & PLAN NOTE
Had cardiology evaluation in the past for similar symptoms with no significant findings.  Will be decreasing caffeine drinks.  Will start exercising as tolerated.  On metoprolol with good heart rate.  If symptoms persist will consider cardiac evaluation.

## 2024-02-14 NOTE — PROGRESS NOTES
Subjective:       Patient ID: Earnestine Petit is a 63 y.o. female.    Chief Complaint: Hypertension      HPI  Earnestine Petit is a 63 y.o. female with  rheumatoid arthritis,hypertension, hyperlipidemia, osteoarthritis, insomnia, anxiety, osteoporosis who presents today for Hypertension    Reports she feels better from her nasal congestion.  Medrol Dosepak helped and feels the Astelin works faster than the fluticasone.  Today feels congestion is coming back with the weather changes but forgot the nasal spray.    Labs with a mild decrease in sodium but stable.  On repeat CBC no teardrop cells and will continue to monitor.  Low uric acid to be observed.    In the past treated with Fosamax, now receiving Prolia and supplements.   She was receiving Prolia add the gynecologist office but will not be able to receive it due to staff availability.  In the past received the medication at an infusion unit but was billed $600 after she received the medication.  Interested in having similar treatment at our office since will not be able to afford the infusion units.  Brings her last DEXA scan compatible with osteoporosis.    Reports has been noticing occasional palpitations.  She was not taking steroids when noticed them.  Has been drinking tea and will hold caffeinated drinks and we will re-evaluate.  Had cardiology evaluation outside [facility] (last in 2020) in the past with stress test, echocardiogram, Holter monitor, and 30 day event recorder without significant findings for the same symptoms.    Has no toxic habits.  Tends to be anxious with financial stressors but otherwise mood is okay.    Mammogram done on 02/23/2023  DEXA scan on 02/23/23  Colonoscopy done on 02/08/2022, repeat in 10 years  Vaccines-due for pneumonia,RSV, and COVID vaccines.Advance Care Planning     Date: 02/14/2024    Power of   I initiated the process of voluntary advance care planning today and explained the importance of this process to the  patient.  I introduced the concept of advance directives to the patient, as well. Then the patient received detailed information about the importance of designating a Health Care Power of  (HCPOA). She was also instructed to communicate with this person about their wishes for future healthcare, should she become sick and lose decision-making capacity. The patient has previously appointed a HCPOA. After our discussion, the patient has decided to complete a HCPOA and has appointed her daughter, health care agent:  Aristeo Moscoso  & health care agent number:  [phone]  I spent a total time of 8 minutes discussing this issue with the patient.    Advance Care Planning     Date: 02/14/2024    Living Will  During this visit, I engaged the patient  in the voluntary advance care planning process.  The patient and I reviewed the role for advance directives and their purpose in directing future healthcare if the patient's unable to speak for him/herself.  At this point in time, the patient does have full decision-making capacity.  We discussed different extreme health states that she could experience, and reviewed what kind of medical care she would want in those situations.  The patient communicated that if she were comatose and had little chance of a meaningful recovery, she would not want machines/life-sustaining treatments used.  The patient has completed a living will to reflect these preferences.  I spent a total of 8 minutes engaging the patient in this advance care planning discussion.              Health Maintenance:  Health Maintenance   Topic Date Due    Hepatitis C Screening  Never done    Mammogram  02/23/2024    High Dose Statin  02/14/2025    DEXA Scan  02/23/2026    TETANUS VACCINE  05/02/2027    Lipid Panel  03/10/2028    Colorectal Cancer Screening  02/08/2032    Shingles Vaccine  Completed       Review of Systems   Constitutional:  Positive for fatigue. Negative for fever and unexpected weight  change.   HENT:  Positive for nasal congestion.    Respiratory: Negative.     Cardiovascular:  Positive for palpitations.   Gastrointestinal: Negative.    Genitourinary:  Negative for difficulty urinating.   Musculoskeletal:  Positive for arthralgias.   Integumentary:  Negative.   Neurological: Negative.    Psychiatric/Behavioral:  Positive for sleep disturbance.       Past Medical History:   Diagnosis Date    Allergy     Arthritis     RH    Hyperlipidemia     Hypertension     Osteoporosis     Palpitations        Past Surgical History:   Procedure Laterality Date    ADENOIDECTOMY      TONSILLECTOMY         Family History   Problem Relation Age of Onset    Diabetes Mother     Cancer Father         Stomach or pancreas    Heart disease Father     Hyperlipidemia Sister     Hypertension Sister     Heart disease Sister     Rheum arthritis Sister     Cancer Brother     Diabetes Maternal Grandmother     Heart disease Maternal Grandfather     Allergies Daughter     Asthma Neg Hx        Social History     Socioeconomic History    Marital status:    Tobacco Use    Smoking status: Never     Passive exposure: Never    Smokeless tobacco: Never   Substance and Sexual Activity    Alcohol use: Yes     Alcohol/week: 7.0 standard drinks of alcohol     Types: 7 Glasses of wine per week     Comment: A glass of wine daily       Current Outpatient Medications   Medication Sig Dispense Refill    atorvastatin (LIPITOR) 20 MG tablet Take 1 tablet (20 mg total) by mouth every evening. 90 tablet 3    azelastine (ASTELIN) 137 mcg (0.1 %) nasal spray 2 sprays (274 mcg total) by Nasal route 2 (two) times daily. 30 mL 3    biotin 10,000 mcg Cap Take 1 capsule by mouth.      calcium carbonate (OS-ALIYAH) 600 mg calcium (1,500 mg) Tab Take 1,200 mg by mouth once daily at 6am.      cetirizine (ZYRTEC) 10 MG tablet Take 10 mg by mouth once daily.      cholecalciferol, vitamin D3, (VITAMIN D3) 50 mcg (2,000 unit) Cap Take 1 tablet by mouth.       "denosumab (PROLIA) 60 mg/mL Syrg Inject 60 mg into the skin.      etanercept (ENBREL SURECLICK) 50 mg/mL (1 mL) Inject 1 mL (50 mg total) into the skin every 7 days. 4 mL 11    ferrous fumarate/vit Bcomp,C (SUPER B COMPLEX ORAL) Take 1 tablet by mouth.      FLOWFLEX COVID-19 AG HOME TEST Kit USE AS DIRECTED ON PACKAGE INSERT.      fluticasone propionate (FLONASE) 50 mcg/actuation nasal spray 2 sprays (100 mcg total) by Each Nostril route once daily. 16 g 12    folic acid (FOLVITE) 1 MG tablet TAKE 1 TABLET (1 MG TOTAL) BY MOUTH ONCE DAILY. 90 tablet 0    glucosamine HCl 1,500 mg Tab Take 1 tablet by mouth.      Lactobac no.41/Bifidobact no.7 (PROBIOTIC-10 ORAL) Take 1 capsule by mouth.      latanoprost 0.005 % ophthalmic solution PLACE ONE DROP IN EACH EYE AT BEDTIME      lisinopriL (PRINIVIL,ZESTRIL) 5 MG tablet Take 1 tablet (5 mg total) by mouth once daily. 90 tablet 3    melatonin 5 mg Cap Take 1 capsule by mouth every evening.      methotrexate 2.5 MG Tab Take 5 tablets (12.5 mg total) by mouth every 7 days. 60 tablet 3    metoprolol tartrate (LOPRESSOR) 25 MG tablet Take 1 tablet (25 mg total) by mouth nightly. 90 tablet 3    ondansetron (ZOFRAN-ODT) 8 MG TbDL DISSOLVE 1 TABLET BY MOUTH EVERY EIGHT HOURS  AS NEEDED FOR NAUSEA AND VOMOTING      traZODone (DESYREL) 50 MG tablet Take 1.5 tablets (75 mg total) by mouth every evening. 135 tablet 2     No current facility-administered medications for this visit.       Review of patient's allergies indicates:  No Known Allergies      Objective:       Last 3 sets of Vitals        9/12/2023     9:50 AM 1/23/2024    11:14 AM 2/14/2024    11:16 AM   Vitals - 1 value per visit   SYSTOLIC 128 124 114   DIASTOLIC 82 84 62   Pulse 68 76 67   SPO2 95 % 97 % 95 %   Weight (lb) 153.44 155.53 156.53   Weight (kg) 69.6 70.55 71   Height 5' 4" (1.626 m) 5' 4" (1.626 m) 5' 4" (1.626 m)   BMI (Calculated) 26.3 26.7 26.9   Pain Score Zero Zero Zero   Physical Exam  Constitutional:  "      General: She is not in acute distress.     Appearance: Normal appearance.   Eyes:      General: No scleral icterus.     Extraocular Movements: Extraocular movements intact.      Conjunctiva/sclera: Conjunctivae normal.   Neck:      Comments: No goiter.  Cardiovascular:      Rate and Rhythm: Normal rate and regular rhythm.      Pulses: Normal pulses.      Heart sounds: Normal heart sounds.   Pulmonary:      Effort: Pulmonary effort is normal.      Breath sounds: Normal breath sounds.   Abdominal:      General: Bowel sounds are normal. There is no distension.      Palpations: Abdomen is soft.   Musculoskeletal:         General: No swelling. Normal range of motion.   Lymphadenopathy:      Cervical: No cervical adenopathy.   Skin:     General: Skin is warm and dry.   Neurological:      General: No focal deficit present.      Mental Status: She is alert and oriented to person, place, and time.   Psychiatric:         Mood and Affect: Mood normal.         Behavior: Behavior normal.           3 words- 3,3  Get up and go- <10 sec  Whisper- wnl      CBC:  Recent Labs   Lab 09/14/23  0943 12/19/23  1052 01/24/24  1044   WBC 6.49 5.74 11.00   RBC 3.98 L 4.03 3.93 L   Hemoglobin 12.0 12.3 12.2   Hematocrit 38.3 36.4 L 36.0 L   Platelets 261 219 253   MCV 96 90 92   MCH 30.2 30.5 31.0   MCHC 31.3 L 33.8 33.9     CMP:  Recent Labs   Lab 09/14/23  0943 12/19/23  1052 01/11/24  1003   Glucose 95 99 105   Calcium 9.3 9.3 9.4   Albumin 4.1 4.0 4.1   Total Protein 7.2 7.3 7.4   Sodium 136 135 L 135 L   Potassium 4.6 4.9 4.4   CO2 22 L 24 25   Chloride 105 106 106   BUN 13 18 14   Creatinine 0.7 0.8 0.7   Alkaline Phosphatase 55 78 65   ALT 25 29 30   AST 23 25 27   Total Bilirubin 0.4 0.3 0.5     URINALYSIS:  Recent Labs   Lab 01/24/24  1040   Color, UA Colorless A   Specific Gravity, UA <1.005 A   pH, UA 6.0   Protein, UA Negative   Nitrite, UA Negative   Leukocytes, UA Negative   Urobilinogen, UA Negative      LIPIDS:  Recent  Labs   Lab 07/06/22  0917 03/10/23  0825   TSH 3.01 3.056   HDL 55 69   Cholesterol 152 173   Triglycerides 122 79   LDL Cholesterol 77 88.2   HDL/Cholesterol Ratio 2.8 39.9   Non-HDL Cholesterol  --  104   Non HDL Chol. (LDL+VLDL) 97  --    Total Cholesterol/HDL Ratio  --  2.5     TSH:  Recent Labs   Lab 07/06/22  0917 03/10/23  0825   TSH 3.01 3.056       A1C:  Recent Labs   Lab 07/06/22  0917 03/10/23  0825   Hemoglobin A1C 5.4 5.3       Imaging:  X-Ray Chest PA And Lateral  Narrative: EXAMINATION:  XR CHEST PA AND LATERAL    CLINICAL HISTORY:  Subacute cough    TECHNIQUE:  PA and lateral views of the chest were performed.    COMPARISON:  12/10/2021    FINDINGS:  The lungs are clear, with normal appearance of pulmonary vasculature.No pleural effusion.No pneumothorax.    The cardiac silhouette is normal in size. The hilar and mediastinal contours are unremarkable.Tortuous aorta.    Bones are intact.  Impression: No acute abnormality.    Electronically signed by: Sterling Horan MD  Date:    01/24/2024  Time:    10:58        Assessment:       1. Encounter for wellness examination in adult    2. Osteoporosis without pathological fracture    3. Allergic rhinitis, unspecified seasonality, unspecified trigger    4. Hypertension, unspecified type    5. Palpitations    6. Anxiety    7. Hyponatremia    8. Hyperlipidemia, unspecified hyperlipidemia type    9. Abnormal blood chemistry            Plan:       1. Encounter for wellness examination in adult    2. Osteoporosis without pathological fracture  Assessment & Plan:  By the DEXA test results bone density is better in some areas and not on others. Will continue Prolia and we could repeat bone density in 1 or 2 years.      3. Allergic rhinitis, unspecified seasonality, unspecified trigger  Assessment & Plan:  Using Zyrtec and Flonase.  Astelin has been helping.  Continue same treatment.      4. Hypertension, unspecified type  Assessment & Plan:  Controlled on lisinopril  and metoprolol.      5. Palpitations  Assessment & Plan:  Had cardiology evaluation in the past for similar symptoms with no significant findings.  Will be decreasing caffeine drinks.  Will start exercising as tolerated.  On metoprolol with good heart rate.  If symptoms persist will consider cardiac evaluation.    Orders:  -     TSH; Future; Expected date: 02/14/2024  -     CBC Auto Differential; Future; Expected date: 02/14/2024  -     Comprehensive Metabolic Panel; Future; Expected date: 02/14/2024    6. Anxiety  Assessment & Plan:  Reports anxiety on an off.  On trazodone to help her sleep.  Will like to avoid medications if possible.  Evaluation by our LCSW offered.  She will try to decrease caffeinated drinks and feels as recent stressors improved she will feel better.      7. Hyponatremia  Assessment & Plan:  Mild, on an off.  Advised to choose fluids with electrolytes.  Will continue to observe.      8. Hyperlipidemia, unspecified hyperlipidemia type  Assessment & Plan:  On atorvastatin with LDL less than 90 mg/dl.      9. Abnormal blood chemistry    Other orders  -     denosumab (PROLIA) injection 60 mg       Health Maintenance Due   Topic Date Due    Hepatitis C Screening  Never done    HIV Screening  Never done    Pneumococcal Vaccines (Age 65+) (2 of 2 - PPSV23 or PCV20) 07/14/2017    RSV Vaccine (Age 60+ and Pregnant patients) (1 - 1-dose 60+ series) Never done    COVID-19 Vaccine (5 - 2023-24 season) 09/01/2023    Mammogram  02/23/2024        I spent a total of 60 minutes on the day of the visit.This includes face to face time and non-face to face time preparing to see the patient (eg, review of tests), obtaining and/or reviewing separately obtained history, documenting clinical information in the electronic or other health record, independently interpreting results and communicating results to the patient/family/caregiver, or care coordinator.       Return to clinic in 3 months.    Vesta Peña  MD [facility]  Disclaimer:  This note has been generated using voice-recognition software. There may be grammatical or spelling errors that have been missed during proof-reading

## 2024-02-14 NOTE — ASSESSMENT & PLAN NOTE
By the DEXA test results bone density is better in some areas and not on others. Will continue Prolia and we could repeat bone density in 1 or 2 years.

## 2024-02-16 ENCOUNTER — TELEPHONE (OUTPATIENT)
Dept: PRIMARY CARE CLINIC | Facility: CLINIC | Age: 64
End: 2024-02-16
Payer: MEDICARE

## 2024-02-16 NOTE — TELEPHONE ENCOUNTER
----- Message from Adrianna Thomas RN sent at 2/15/2024  4:51 PM CST -----  Regarding: FW: Adán Giordano asked that I forward this to you.  Ask Giuliana about what to do with this.   ----- Message -----  From: Vesta Peña MD  Sent: 2/15/2024  11:03 AM CST  To: Adrianna Thomas RN  Subject: Prolia                                           Good morning, I am sharing the patient's chart to see if you can help her giving Prolia at the office.  Thanks,    Vesta

## 2024-02-19 ENCOUNTER — TELEPHONE (OUTPATIENT)
Dept: PRIMARY CARE CLINIC | Facility: CLINIC | Age: 64
End: 2024-02-19
Payer: MEDICARE

## 2024-02-19 DIAGNOSIS — M81.0 OSTEOPOROSIS WITHOUT PATHOLOGICAL FRACTURE: Primary | ICD-10-CM

## 2024-02-19 NOTE — TELEPHONE ENCOUNTER
----- Message from Vesta Peña MD sent at 2/14/2024  6:41 PM CST -----  Regarding: Prolia  Essie, just reminded you of the patient that will like to receive Prolia at the office.    Dr. Peña

## 2024-02-19 NOTE — TELEPHONE ENCOUNTER
Hi Dr. Mariana Sánchez was handling this, I will ask her for an update.    It will cost the pt 99.00 and was sent to [facility].   Princess said the pharmacy can administer. Princess is notifying the pt of this now.

## 2024-02-21 ENCOUNTER — TELEPHONE (OUTPATIENT)
Dept: PRIMARY CARE CLINIC | Facility: CLINIC | Age: 64
End: 2024-02-21
Payer: MEDICARE

## 2024-02-21 NOTE — TELEPHONE ENCOUNTER
----- Message from Zak Snyder, PharmD sent at 2/20/2024  4:49 PM CST -----  Regarding: Prolia Rx  Good afternoon,     This is Zak from [facility]. We received a call from Ms. Petit regarding her Prolia and the status of it. We however, do not have a Rx for Prolia here. Would you be able to send a Rx to [facility] and we can work to have it delivered to your clinic for her injection?    It looks like it will be no charge under her Part D insurance.    Thanks!    Zak Snyder, PharmD  Clinical Pharmacist  [facility]  [phone]

## 2024-02-21 NOTE — TELEPHONE ENCOUNTER
----- Message from Zak Snyder PharmD sent at 2/21/2024  9:01 AM CST -----  Regarding: RE: Prolia Rx  Dr. Peña,    Unfortunately we are not able to administer medications here at the specialty pharmacy. We usually  Prolia to the clinic/infusion center for patient administration.    Thanks!    Zak Snyder PharmD  Clinical Pharmacist  [facility]  [phone]  ----- Message -----  From: Vesta Peña MD  Sent: 2/20/2024   6:26 PM CST  To: Zak Snyder, PharmD; Jatin Rascon, RN  Subject: RE: Prolia Rx                                    Hello, send a prescription with refills.  We do not have a nurse to give the medication yet but hoping to have her in the next 2 weeks.  Do you have any body in your clinic to give it the 1st time?  Thanks  Vesta Peña MD  ----- Message -----  From: Zak Snyder, [place]  Sent: 2/20/2024   4:54 PM CST  To: Vesta Peña MD; Jatin Rascon, RN  Subject: Prolia Rx                                        Good afternoon,     This is Zak from [facility]. We received a call from Ms. Petit regarding her Prolia and the status of it. We however, do not have a Rx for Prolia here. Would you be able to send a Rx to [facility] and we can work to have it delivered to your clinic for her injection?    It looks like it will be no charge under her Part D insurance.    Thanks!    Zak Snyder, [place]  Clinical Pharmacist  [facility]  [phone]

## 2024-02-21 NOTE — TELEPHONE ENCOUNTER
----- Message from Zak Snyder, PharmD sent at 2/20/2024  4:49 PM CST -----  Regarding: Prolia Rx  Good afternoon,     This is Zak from [facility]. We received a call from Ms. Petit regarding her Prolia and the status of it. We however, do not have a Rx for Prolia here. Would you be able to send a Rx to [facility] and we can work to have it delivered to your clinic for her injection?    It looks like it will be no charge under her Part D insurance.    Thanks!    Zak Snyder, PharmD  Clinical Pharmacist  [facility]  [phone]

## 2024-02-21 NOTE — TELEPHONE ENCOUNTER
Zak from [facility] is following up with Princess about Prolia. Patient called, too, and said she is overdue for Rx, and knows you are working on Rx.     # [phone].

## 2024-02-21 NOTE — TELEPHONE ENCOUNTER
----- Message from Vesta Peña MD sent at 2/20/2024  6:24 PM CST -----  Regarding: RE: Prolia Rx  Hello, send a prescription with refills.  We do not have a nurse to give the medication yet but hoping to have her in the next 2 weeks.  Do you have any body in your clinic to give it the 1st time?  Thanks  Vesta Peña MD  ----- Message -----  From: Zak Snyder, PharmD  Sent: 2/20/2024   4:54 PM CST  To: Vesta Peña MD; Jatin Rascon, VICTORINA  Subject: Prolia Rx                                        Good afternoon,     This is Zak from [facility]. We received a call from Ms. Petit regarding her Prolia and the status of it. We however, do not have a Rx for Prolia here. Would you be able to send a Rx to [facility] and we can work to have it delivered to your clinic for her injection?    It looks like it will be no charge under her Part D insurance.    Thanks!    Zak Snyder, PharmD  Clinical Pharmacist  [facility]  [phone]

## 2024-02-23 ENCOUNTER — LAB VISIT (OUTPATIENT)
Dept: LAB | Facility: HOSPITAL | Age: 64
End: 2024-02-23
Attending: INTERNAL MEDICINE
Payer: MEDICARE

## 2024-02-23 DIAGNOSIS — M06.9 RHEUMATOID ARTHRITIS INVOLVING MULTIPLE JOINTS: ICD-10-CM

## 2024-02-23 LAB
ALBUMIN SERPL BCP-MCNC: 4 G/DL (ref 3.5–5.2)
ALP SERPL-CCNC: 64 U/L (ref 55–135)
ALT SERPL W/O P-5'-P-CCNC: 26 U/L (ref 10–44)
ANION GAP SERPL CALC-SCNC: 8 MMOL/L (ref 8–16)
AST SERPL-CCNC: 27 U/L (ref 10–40)
BASOPHILS # BLD AUTO: 0.05 K/UL (ref 0–0.2)
BASOPHILS NFR BLD: 0.6 % (ref 0–1.9)
BILIRUB SERPL-MCNC: 0.4 MG/DL (ref 0.1–1)
BUN SERPL-MCNC: 18 MG/DL (ref 8–23)
CALCIUM SERPL-MCNC: 9.3 MG/DL (ref 8.7–10.5)
CHLORIDE SERPL-SCNC: 105 MMOL/L (ref 95–110)
CO2 SERPL-SCNC: 23 MMOL/L (ref 23–29)
CREAT SERPL-MCNC: 0.8 MG/DL (ref 0.5–1.4)
CRP SERPL-MCNC: 0.3 MG/L (ref 0–8.2)
DIFFERENTIAL METHOD BLD: NORMAL
EOSINOPHIL # BLD AUTO: 0.2 K/UL (ref 0–0.5)
EOSINOPHIL NFR BLD: 2.3 % (ref 0–8)
ERYTHROCYTE [DISTWIDTH] IN BLOOD BY AUTOMATED COUNT: 14.1 % (ref 11.5–14.5)
ERYTHROCYTE [SEDIMENTATION RATE] IN BLOOD BY PHOTOMETRIC METHOD: 10 MM/HR (ref 0–36)
EST. GFR  (NO RACE VARIABLE): >60 ML/MIN/1.73 M^2
GLUCOSE SERPL-MCNC: 103 MG/DL (ref 70–110)
HCT VFR BLD AUTO: 37.9 % (ref 37–48.5)
HGB BLD-MCNC: 12.6 G/DL (ref 12–16)
IMM GRANULOCYTES # BLD AUTO: 0.03 K/UL (ref 0–0.04)
IMM GRANULOCYTES NFR BLD AUTO: 0.4 % (ref 0–0.5)
LYMPHOCYTES # BLD AUTO: 2.3 K/UL (ref 1–4.8)
LYMPHOCYTES NFR BLD: 27.8 % (ref 18–48)
MCH RBC QN AUTO: 31 PG (ref 27–31)
MCHC RBC AUTO-ENTMCNC: 33.2 G/DL (ref 32–36)
MCV RBC AUTO: 93 FL (ref 82–98)
MONOCYTES # BLD AUTO: 0.8 K/UL (ref 0.3–1)
MONOCYTES NFR BLD: 9.9 % (ref 4–15)
NEUTROPHILS # BLD AUTO: 5 K/UL (ref 1.8–7.7)
NEUTROPHILS NFR BLD: 59 % (ref 38–73)
NRBC BLD-RTO: 0 /100 WBC
PLATELET # BLD AUTO: 254 K/UL (ref 150–450)
PMV BLD AUTO: 10.4 FL (ref 9.2–12.9)
POTASSIUM SERPL-SCNC: 4.7 MMOL/L (ref 3.5–5.1)
PROT SERPL-MCNC: 7.2 G/DL (ref 6–8.4)
RBC # BLD AUTO: 4.07 M/UL (ref 4–5.4)
SODIUM SERPL-SCNC: 136 MMOL/L (ref 136–145)
WBC # BLD AUTO: 8.41 K/UL (ref 3.9–12.7)

## 2024-02-23 PROCEDURE — 36415 COLL VENOUS BLD VENIPUNCTURE: CPT | Performed by: INTERNAL MEDICINE

## 2024-02-23 PROCEDURE — 86140 C-REACTIVE PROTEIN: CPT | Performed by: INTERNAL MEDICINE

## 2024-02-23 PROCEDURE — 80053 COMPREHEN METABOLIC PANEL: CPT | Performed by: INTERNAL MEDICINE

## 2024-02-23 PROCEDURE — 85652 RBC SED RATE AUTOMATED: CPT | Performed by: INTERNAL MEDICINE

## 2024-02-23 PROCEDURE — 85025 COMPLETE CBC W/AUTO DIFF WBC: CPT | Performed by: INTERNAL MEDICINE

## 2024-02-26 ENCOUNTER — OFFICE VISIT (OUTPATIENT)
Dept: RHEUMATOLOGY | Facility: CLINIC | Age: 64
End: 2024-02-26
Payer: MEDICARE

## 2024-02-26 ENCOUNTER — HOSPITAL ENCOUNTER (OUTPATIENT)
Dept: RADIOLOGY | Facility: HOSPITAL | Age: 64
Discharge: HOME | End: 2024-02-26
Attending: INTERNAL MEDICINE
Payer: MEDICARE

## 2024-02-26 VITALS
DIASTOLIC BLOOD PRESSURE: 70 MMHG | BODY MASS INDEX: 27.02 KG/M2 | HEART RATE: 70 BPM | WEIGHT: 157.44 LBS | SYSTOLIC BLOOD PRESSURE: 101 MMHG

## 2024-02-26 DIAGNOSIS — Z79.899 IMMUNODEFICIENCY DUE TO DRUG THERAPY: ICD-10-CM

## 2024-02-26 DIAGNOSIS — D84.821 IMMUNODEFICIENCY DUE TO DRUG THERAPY: ICD-10-CM

## 2024-02-26 DIAGNOSIS — M06.9 RHEUMATOID ARTHRITIS INVOLVING MULTIPLE JOINTS: ICD-10-CM

## 2024-02-26 DIAGNOSIS — M06.9 RHEUMATOID ARTHRITIS INVOLVING MULTIPLE JOINTS: Primary | ICD-10-CM

## 2024-02-26 PROCEDURE — 3078F DIAST BP <80 MM HG: CPT | Mod: CPTII,S$GLB,, | Performed by: INTERNAL MEDICINE

## 2024-02-26 PROCEDURE — 77077 JOINT SURVEY SINGLE VIEW: CPT | Mod: TC

## 2024-02-26 PROCEDURE — 3008F BODY MASS INDEX DOCD: CPT | Mod: CPTII,S$GLB,, | Performed by: INTERNAL MEDICINE

## 2024-02-26 PROCEDURE — 99999 PR PBB SHADOW E&M-EST. PATIENT-LVL IV: CPT | Mod: PBBFAC,,, | Performed by: INTERNAL MEDICINE

## 2024-02-26 PROCEDURE — 99214 OFFICE O/P EST MOD 30 MIN: CPT | Mod: S$GLB,,, | Performed by: INTERNAL MEDICINE

## 2024-02-26 PROCEDURE — 77077 JOINT SURVEY SINGLE VIEW: CPT | Mod: 26,,, | Performed by: RADIOLOGY

## 2024-02-26 PROCEDURE — 4010F ACE/ARB THERAPY RXD/TAKEN: CPT | Mod: CPTII,S$GLB,, | Performed by: INTERNAL MEDICINE

## 2024-02-26 PROCEDURE — 1126F AMNT PAIN NOTED NONE PRSNT: CPT | Mod: CPTII,S$GLB,, | Performed by: INTERNAL MEDICINE

## 2024-02-26 PROCEDURE — 1159F MED LIST DOCD IN RCRD: CPT | Mod: CPTII,S$GLB,, | Performed by: INTERNAL MEDICINE

## 2024-02-26 PROCEDURE — 3074F SYST BP LT 130 MM HG: CPT | Mod: CPTII,S$GLB,, | Performed by: INTERNAL MEDICINE

## 2024-02-26 RX ORDER — TIMOLOL MALEATE 5 MG/ML
1 SOLUTION/ DROPS OPHTHALMIC EVERY MORNING
COMMUNITY
Start: 2024-02-22

## 2024-02-26 NOTE — PROGRESS NOTES
Subjective:       Patient ID: Earnestine Petit is a 61 y.o. female.    Chief Complaint: Disease Management    HPI 61 year old F with PMH of RA (around age 57), osteoporosis, palpitations here for evaluation.  When she was fist diagnosed with RA, she had involvement of shoulders, elbows, hands,wrists, knees, ankle, and feet.  She was started on MTX and prednisone at time of diagnosis.  She is taking MTX 3 pills once a week for a year and also on enbrel for over 3 years. She is taking folic acid 1 mg a day.  She has mild pain in right knee, right wrist, and some pain in hands with making a fist. She also has pain in feet. She reports she gets swelling in left second finger and other fingers on left hand. On occasion, her right knee will get swollen.  Resting improves the pain. Denies any rashes, oral ulcers, hair loss, fevers,or photosensitivity. She smoked when she was 14 just socially.      She is on prolia for a year and half for osteoporosis. She was on avista for 3 years.  She has had osteoporosis since age 52.    Family hx: sister: RA  Aunt: RA    Interval history: She is taking MTX 5  pills once a week. She is taking folic acid  1 mg a day.  She is on Enbrel.   She has pain in right knee when she crosses it. Sometimes, she has pain in shoulders.   Past Medical History:   Diagnosis Date    Arthritis     RH    Hyperlipidemia     Hypertension     Osteoporosis        Review of Systems   Constitutional: Negative for activity change, appetite change, chills, diaphoresis, fatigue, fever and unexpected weight change.   HENT: Negative for congestion, ear discharge, ear pain, facial swelling, mouth sores, sinus pressure, sneezing, sore throat, tinnitus and trouble swallowing.    Eyes: Negative for photophobia, pain, discharge, redness, itching and visual disturbance.   Respiratory: Negative for apnea, cough, chest tightness, shortness of breath, wheezing and stridor.    Cardiovascular: Negative for chest pain and leg  swelling.   Gastrointestinal: Negative for abdominal distention, abdominal pain, anal bleeding, blood in stool, constipation, diarrhea and nausea.   Endocrine: Negative for cold intolerance and heat intolerance.   Genitourinary: Negative for difficulty urinating, dysuria and genital sores.   Musculoskeletal: Positive for arthralgias. Negative for back pain, gait problem, joint swelling, myalgias, neck pain and neck stiffness.   Skin: Negative for color change, pallor, rash and wound.   Neurological: Negative for dizziness, seizures, light-headedness, numbness and headaches.   Hematological: Negative for adenopathy. Does not bruise/bleed easily.   Psychiatric/Behavioral: Negative for sleep disturbance. The patient is not nervous/anxious.            Objective:        Physical Exam   Constitutional: She is oriented to person, place, and time.   HENT:   Head: Normocephalic and atraumatic.   Right Ear: External ear normal.   Left Ear: External ear normal.   Nose: Nose normal.   Mouth/Throat: Oropharynx is clear and moist. No oropharyngeal exudate.   Eyes: Conjunctivae and EOM are normal. Pupils are equal, round, and reactive to light. Right eye exhibits no discharge. Left eye exhibits no discharge. No scleral icterus.   Neck: No JVD present. No thyromegaly present.   Cardiovascular: Normal rate, regular rhythm, normal heart sounds and intact distal pulses.  Exam reveals no gallop and no friction rub.    No murmur heard.  Pulmonary/Chest: Effort normal and breath sounds normal. No respiratory distress. She has no wheezes. She has no rales. She exhibits no tenderness.   Abdominal: Soft. Bowel sounds are normal. She exhibits no distension and no mass. There is no abdominal tenderness. There is no rebound and no guarding.   Lymphadenopathy:     She has no cervical adenopathy.   Neurological: She is alert and oriented to person, place, and time. No cranial nerve deficit. Gait normal. Coordination normal.   Skin: Skin is dry.  No rash noted. No erythema. No pallor.     Psychiatric: Affect and judgment normal.   Musculoskeletal: Deformity present. No tenderness or edema.           Assessment:     63   year old F with PMH of RA (around age 57), osteoporosis, palpitations here for evaluation.   She is on MTX 3 pills once a week and enbrel and was flaring so I increased MTX to  5 pills once a week.  Doing well on MTX 5 and Enbrel so will continue.  Plan:       Problem List Items Addressed This Visit    None       Contin  MTX 5 pills once a week  Continue folic acid 1 mg po qday  Labs every 3 months  Xrays today for monitoring  Follow up in 6 months    30 * minutes of total time spent on the encounter, which includes face to face time and non-face to face time preparing to see the patient (eg, review of tests), Obtaining and/or reviewing separately obtained history, Documenting clinical information in the electronic or other health record, Independently interpreting results (not separately reported) and communicating results to the patient/family/caregiver, or Care coordination (not separately reported).

## 2024-02-27 ENCOUNTER — TELEPHONE (OUTPATIENT)
Dept: PRIMARY CARE CLINIC | Facility: CLINIC | Age: 64
End: 2024-02-27
Payer: MEDICARE

## 2024-02-27 NOTE — TELEPHONE ENCOUNTER
Earnestine Mast called here about her Prolia injection. The [facility] called her and said it would be delivered today. She wants to schedule the injection for tomorrow because she is past-due for it. Please call her at [phone].

## 2024-02-28 ENCOUNTER — CLINICAL SUPPORT (OUTPATIENT)
Dept: PRIMARY CARE CLINIC | Facility: CLINIC | Age: 64
End: 2024-02-28
Payer: MEDICARE

## 2024-02-28 VITALS
SYSTOLIC BLOOD PRESSURE: 120 MMHG | HEIGHT: 64 IN | WEIGHT: 151.13 LBS | DIASTOLIC BLOOD PRESSURE: 82 MMHG | BODY MASS INDEX: 25.8 KG/M2 | TEMPERATURE: 99 F | HEART RATE: 56 BPM | OXYGEN SATURATION: 100 %

## 2024-02-28 DIAGNOSIS — M81.0 OSTEOPOROSIS WITHOUT PATHOLOGICAL FRACTURE: Primary | ICD-10-CM

## 2024-02-28 PROCEDURE — 99999 PR PBB SHADOW E&M-EST. PATIENT-LVL V: CPT | Mod: PBBFAC,,,

## 2024-02-28 PROCEDURE — 96372 THER/PROPH/DIAG INJ SC/IM: CPT | Mod: S$GLB,,, | Performed by: INTERNAL MEDICINE

## 2024-02-28 NOTE — PROGRESS NOTES
Pt presents for Prolia shot, identified by name and date of birth. Administered denosumab Prolia 60 mg SubQ to posterior Right upper arm via aseptic technique, no blood noted, sterile bandaid applied, tolerated well. Pt instructed to remain in clinic for 15 min of monitoring, pt verbalized understanding.      NDC 94185-337-26  Lot [id number]  EXP Feb 28 2026

## 2024-03-02 ENCOUNTER — PATIENT MESSAGE (OUTPATIENT)
Dept: PRIMARY CARE CLINIC | Facility: CLINIC | Age: 64
End: 2024-03-02
Payer: MEDICARE

## 2024-03-05 ENCOUNTER — PATIENT MESSAGE (OUTPATIENT)
Dept: RHEUMATOLOGY | Facility: CLINIC | Age: 64
End: 2024-03-05
Payer: MEDICARE

## 2024-03-18 ENCOUNTER — PATIENT MESSAGE (OUTPATIENT)
Dept: ADMINISTRATIVE | Facility: HOSPITAL | Age: 64
End: 2024-03-18
Payer: MEDICARE

## 2024-03-18 ENCOUNTER — PATIENT OUTREACH (OUTPATIENT)
Dept: ADMINISTRATIVE | Facility: HOSPITAL | Age: 64
End: 2024-03-18
Payer: MEDICARE

## 2024-03-18 LAB — BCS RECOMMENDATION EXT: NORMAL

## 2024-03-18 NOTE — PROGRESS NOTES
Population Health Chart Review & Patient Outreach Details      Additional [facility] Health Notes:               Updates Requested / Reviewed:      Updated Care Coordination Note, External Sources: DIS, Removed  or Duplicate Orders, and Immunizations Reconciliation Completed or Queried: [facility] Topics Overdue:      [facility] Score: 0     Patient is not due for any topics at this time.    Pneumonia Vaccine  RSV Vaccine                  Health Maintenance Topic(s) Outreach Outcomes & Actions Taken:    Breast Cancer Screening - Outreach Outcomes & Actions Taken  : External Records Uploaded & Care Team Updated if Applicable

## 2024-04-11 DIAGNOSIS — I10 ESSENTIAL HYPERTENSION: ICD-10-CM

## 2024-04-11 DIAGNOSIS — E78.00 PURE HYPERCHOLESTEROLEMIA: ICD-10-CM

## 2024-04-11 RX ORDER — LISINOPRIL 5 MG/1
5 TABLET ORAL DAILY
Qty: 90 TABLET | Refills: 3 | Status: SHIPPED | OUTPATIENT
Start: 2024-04-11

## 2024-04-11 RX ORDER — ATORVASTATIN CALCIUM 20 MG/1
20 TABLET, FILM COATED ORAL NIGHTLY
Qty: 90 TABLET | Refills: 3 | Status: SHIPPED | OUTPATIENT
Start: 2024-04-11

## 2024-04-11 RX ORDER — METOPROLOL TARTRATE 25 MG/1
25 TABLET, FILM COATED ORAL NIGHTLY
Qty: 90 TABLET | Refills: 3 | Status: SHIPPED | OUTPATIENT
Start: 2024-04-11

## 2024-04-11 NOTE — TELEPHONE ENCOUNTER
Requested Prescriptions     Pending Prescriptions Disp Refills    metoprolol tartrate (LOPRESSOR) 25 MG tablet 90 tablet 3     Sig: Take 1 tablet (25 mg total) by mouth nightly.    atorvastatin (LIPITOR) 20 MG tablet 90 tablet 3     Sig: Take 1 tablet (20 mg total) by mouth every evening.    lisinopriL (PRINIVIL,ZESTRIL) 5 MG tablet 90 tablet 3     Sig: Take 1 tablet (5 mg total) by mouth once daily.      Has been sent to

## 2024-04-11 NOTE — TELEPHONE ENCOUNTER
----- Message from Ana Dixon sent at 4/11/2024 10:30 AM CDT -----  Type:  Needs Medical Advice/refill    Who Called: pharmacy     Pharmacy name and phone #:  [facility] - [place] - [address]  [address] [place] [address]  Phone: [phone] Fax: [phone]  Hours: Not open 24 hours      Would the patient rather a call back or a response via [facility]? Call   Best Call Back Number: [phone]    Additional Information: Earnestine Petit calling regarding Refills (message) for #metoprolol tartrate (LOPRESSOR) 25 MG tablet,atorvastatin (LIPITOR) 20 MG tablet,lisinopriL (PRINIVIL,ZESTRIL) 5 MG tablet

## 2024-06-14 ENCOUNTER — LAB VISIT (OUTPATIENT)
Dept: LAB | Facility: HOSPITAL | Age: 64
End: 2024-06-14
Attending: INTERNAL MEDICINE
Payer: MEDICARE

## 2024-06-14 DIAGNOSIS — M06.9 RHEUMATOID ARTHRITIS INVOLVING MULTIPLE JOINTS: ICD-10-CM

## 2024-06-14 LAB
ALBUMIN SERPL BCP-MCNC: 4 G/DL (ref 3.5–5.2)
ALP SERPL-CCNC: 54 U/L (ref 55–135)
ALT SERPL W/O P-5'-P-CCNC: 26 U/L (ref 10–44)
ANION GAP SERPL CALC-SCNC: 7 MMOL/L (ref 8–16)
AST SERPL-CCNC: 30 U/L (ref 10–40)
BASOPHILS # BLD AUTO: 0.05 K/UL (ref 0–0.2)
BASOPHILS NFR BLD: 0.7 % (ref 0–1.9)
BILIRUB SERPL-MCNC: 0.5 MG/DL (ref 0.1–1)
BUN SERPL-MCNC: 15 MG/DL (ref 8–23)
CALCIUM SERPL-MCNC: 9.1 MG/DL (ref 8.7–10.5)
CHLORIDE SERPL-SCNC: 107 MMOL/L (ref 95–110)
CO2 SERPL-SCNC: 23 MMOL/L (ref 23–29)
CREAT SERPL-MCNC: 0.8 MG/DL (ref 0.5–1.4)
CRP SERPL-MCNC: <0.3 MG/L (ref 0–8.2)
DIFFERENTIAL METHOD BLD: NORMAL
EOSINOPHIL # BLD AUTO: 0.2 K/UL (ref 0–0.5)
EOSINOPHIL NFR BLD: 2.6 % (ref 0–8)
ERYTHROCYTE [DISTWIDTH] IN BLOOD BY AUTOMATED COUNT: 13.6 % (ref 11.5–14.5)
ERYTHROCYTE [SEDIMENTATION RATE] IN BLOOD BY PHOTOMETRIC METHOD: 15 MM/HR (ref 0–36)
EST. GFR  (NO RACE VARIABLE): >60 ML/MIN/1.73 M^2
GLUCOSE SERPL-MCNC: 112 MG/DL (ref 70–110)
HCT VFR BLD AUTO: 37.9 % (ref 37–48.5)
HGB BLD-MCNC: 12.4 G/DL (ref 12–16)
IMM GRANULOCYTES # BLD AUTO: 0.01 K/UL (ref 0–0.04)
IMM GRANULOCYTES NFR BLD AUTO: 0.1 % (ref 0–0.5)
LYMPHOCYTES # BLD AUTO: 2.5 K/UL (ref 1–4.8)
LYMPHOCYTES NFR BLD: 32.8 % (ref 18–48)
MCH RBC QN AUTO: 30.8 PG (ref 27–31)
MCHC RBC AUTO-ENTMCNC: 32.7 G/DL (ref 32–36)
MCV RBC AUTO: 94 FL (ref 82–98)
MONOCYTES # BLD AUTO: 0.7 K/UL (ref 0.3–1)
MONOCYTES NFR BLD: 8.6 % (ref 4–15)
NEUTROPHILS # BLD AUTO: 4.2 K/UL (ref 1.8–7.7)
NEUTROPHILS NFR BLD: 55.2 % (ref 38–73)
NRBC BLD-RTO: 0 /100 WBC
PLATELET # BLD AUTO: 237 K/UL (ref 150–450)
PMV BLD AUTO: 11.1 FL (ref 9.2–12.9)
POTASSIUM SERPL-SCNC: 4.1 MMOL/L (ref 3.5–5.1)
PROT SERPL-MCNC: 7.2 G/DL (ref 6–8.4)
RBC # BLD AUTO: 4.02 M/UL (ref 4–5.4)
SODIUM SERPL-SCNC: 137 MMOL/L (ref 136–145)
WBC # BLD AUTO: 7.57 K/UL (ref 3.9–12.7)

## 2024-06-14 PROCEDURE — 80053 COMPREHEN METABOLIC PANEL: CPT | Mod: HCNC | Performed by: INTERNAL MEDICINE

## 2024-06-14 PROCEDURE — 86140 C-REACTIVE PROTEIN: CPT | Mod: HCNC | Performed by: INTERNAL MEDICINE

## 2024-06-14 PROCEDURE — 85652 RBC SED RATE AUTOMATED: CPT | Mod: HCNC | Performed by: INTERNAL MEDICINE

## 2024-06-14 PROCEDURE — 36415 COLL VENOUS BLD VENIPUNCTURE: CPT | Mod: HCNC,PO | Performed by: INTERNAL MEDICINE

## 2024-06-14 PROCEDURE — 85025 COMPLETE CBC W/AUTO DIFF WBC: CPT | Mod: HCNC | Performed by: INTERNAL MEDICINE

## 2024-07-01 DIAGNOSIS — M06.9 RHEUMATOID ARTHRITIS INVOLVING MULTIPLE JOINTS: ICD-10-CM

## 2024-07-01 RX ORDER — METHOTREXATE 2.5 MG/1
12.5 TABLET ORAL
Qty: 60 TABLET | Refills: 3 | Status: SHIPPED | OUTPATIENT
Start: 2024-07-01 | End: 2025-07-01

## 2024-07-31 ENCOUNTER — TELEPHONE (OUTPATIENT)
Dept: PRIMARY CARE CLINIC | Facility: CLINIC | Age: 64
End: 2024-07-31
Payer: MEDICARE

## 2024-07-31 NOTE — TELEPHONE ENCOUNTER
----- Message from Anny Marroquin sent at 7/31/2024  2:34 PM CDT -----  Contact: Pt [phone]  Please call pt regarding Prolia.     Thank you

## 2024-08-01 ENCOUNTER — PATIENT MESSAGE (OUTPATIENT)
Dept: PRIMARY CARE CLINIC | Facility: CLINIC | Age: 64
End: 2024-08-01
Payer: MEDICARE

## 2024-08-09 ENCOUNTER — OFFICE VISIT (OUTPATIENT)
Dept: PRIMARY CARE CLINIC | Facility: CLINIC | Age: 64
End: 2024-08-09
Payer: MEDICARE

## 2024-08-09 VITALS
WEIGHT: 151.69 LBS | DIASTOLIC BLOOD PRESSURE: 68 MMHG | HEIGHT: 64 IN | OXYGEN SATURATION: 96 % | BODY MASS INDEX: 25.9 KG/M2 | HEART RATE: 67 BPM | SYSTOLIC BLOOD PRESSURE: 126 MMHG

## 2024-08-09 DIAGNOSIS — H40.43X0 GLAUCOMA OF BOTH EYES SECONDARY TO INFLAMMATION, UNSPECIFIED GLAUCOMA STAGE: ICD-10-CM

## 2024-08-09 DIAGNOSIS — I10 ESSENTIAL HYPERTENSION: ICD-10-CM

## 2024-08-09 DIAGNOSIS — K59.00 CONSTIPATION, UNSPECIFIED CONSTIPATION TYPE: ICD-10-CM

## 2024-08-09 DIAGNOSIS — R79.9 ABNORMAL BLOOD CHEMISTRY: ICD-10-CM

## 2024-08-09 DIAGNOSIS — Z00.00 HEALTHCARE MAINTENANCE: ICD-10-CM

## 2024-08-09 DIAGNOSIS — I10 HYPERTENSION, UNSPECIFIED TYPE: ICD-10-CM

## 2024-08-09 DIAGNOSIS — M05.79 RHEUMATOID ARTHRITIS INVOLVING MULTIPLE SITES WITH POSITIVE RHEUMATOID FACTOR: ICD-10-CM

## 2024-08-09 DIAGNOSIS — F51.01 PRIMARY INSOMNIA: Primary | ICD-10-CM

## 2024-08-09 DIAGNOSIS — M81.0 OSTEOPOROSIS WITHOUT PATHOLOGICAL FRACTURE: ICD-10-CM

## 2024-08-09 DIAGNOSIS — E78.5 HYPERLIPIDEMIA, UNSPECIFIED HYPERLIPIDEMIA TYPE: ICD-10-CM

## 2024-08-09 PROCEDURE — 99999 PR PBB SHADOW E&M-EST. PATIENT-LVL V: CPT | Mod: PBBFAC,HCNC,, | Performed by: INTERNAL MEDICINE

## 2024-08-09 RX ORDER — TRAZODONE HYDROCHLORIDE 50 MG/1
100 TABLET ORAL NIGHTLY
Qty: 180 TABLET | Refills: 2 | Status: SHIPPED | OUTPATIENT
Start: 2024-08-09

## 2024-08-13 DIAGNOSIS — M81.0 OSTEOPOROSIS WITHOUT PATHOLOGICAL FRACTURE: ICD-10-CM

## 2024-08-16 ENCOUNTER — LAB VISIT (OUTPATIENT)
Dept: LAB | Facility: HOSPITAL | Age: 64
End: 2024-08-16
Attending: INTERNAL MEDICINE
Payer: MEDICARE

## 2024-08-16 DIAGNOSIS — R79.9 ABNORMAL BLOOD CHEMISTRY: ICD-10-CM

## 2024-08-16 DIAGNOSIS — E78.5 HYPERLIPIDEMIA, UNSPECIFIED HYPERLIPIDEMIA TYPE: ICD-10-CM

## 2024-08-16 DIAGNOSIS — I10 ESSENTIAL HYPERTENSION: ICD-10-CM

## 2024-08-16 LAB
ALBUMIN SERPL BCP-MCNC: 4 G/DL (ref 3.5–5.2)
ALP SERPL-CCNC: 57 U/L (ref 55–135)
ALT SERPL W/O P-5'-P-CCNC: 23 U/L (ref 10–44)
ANION GAP SERPL CALC-SCNC: 9 MMOL/L (ref 8–16)
AST SERPL-CCNC: 25 U/L (ref 10–40)
BASOPHILS # BLD AUTO: 0.04 K/UL (ref 0–0.2)
BASOPHILS NFR BLD: 0.7 % (ref 0–1.9)
BILIRUB SERPL-MCNC: 0.3 MG/DL (ref 0.1–1)
BUN SERPL-MCNC: 15 MG/DL (ref 8–23)
CALCIUM SERPL-MCNC: 9.3 MG/DL (ref 8.7–10.5)
CHLORIDE SERPL-SCNC: 101 MMOL/L (ref 95–110)
CHOLEST SERPL-MCNC: 153 MG/DL (ref 120–199)
CHOLEST/HDLC SERPL: 3.2 {RATIO} (ref 2–5)
CO2 SERPL-SCNC: 20 MMOL/L (ref 23–29)
CREAT SERPL-MCNC: 0.7 MG/DL (ref 0.5–1.4)
DIFFERENTIAL METHOD BLD: ABNORMAL
EOSINOPHIL # BLD AUTO: 0.2 K/UL (ref 0–0.5)
EOSINOPHIL NFR BLD: 3.2 % (ref 0–8)
ERYTHROCYTE [DISTWIDTH] IN BLOOD BY AUTOMATED COUNT: 13.9 % (ref 11.5–14.5)
EST. GFR  (NO RACE VARIABLE): >60 ML/MIN/1.73 M^2
ESTIMATED AVG GLUCOSE: 111 MG/DL (ref 68–131)
GLUCOSE SERPL-MCNC: 105 MG/DL (ref 70–110)
HBA1C MFR BLD: 5.5 % (ref 4–5.6)
HCT VFR BLD AUTO: 36.9 % (ref 37–48.5)
HDLC SERPL-MCNC: 48 MG/DL (ref 40–75)
HDLC SERPL: 31.4 % (ref 20–50)
HGB BLD-MCNC: 11.4 G/DL (ref 12–16)
IMM GRANULOCYTES # BLD AUTO: 0.01 K/UL (ref 0–0.04)
IMM GRANULOCYTES NFR BLD AUTO: 0.2 % (ref 0–0.5)
LDLC SERPL CALC-MCNC: 82.8 MG/DL (ref 63–159)
LYMPHOCYTES # BLD AUTO: 1.7 K/UL (ref 1–4.8)
LYMPHOCYTES NFR BLD: 30.6 % (ref 18–48)
MCH RBC QN AUTO: 30.1 PG (ref 27–31)
MCHC RBC AUTO-ENTMCNC: 30.9 G/DL (ref 32–36)
MCV RBC AUTO: 97 FL (ref 82–98)
MONOCYTES # BLD AUTO: 0.9 K/UL (ref 0.3–1)
MONOCYTES NFR BLD: 15 % (ref 4–15)
NEUTROPHILS # BLD AUTO: 2.8 K/UL (ref 1.8–7.7)
NEUTROPHILS NFR BLD: 50.3 % (ref 38–73)
NONHDLC SERPL-MCNC: 105 MG/DL
NRBC BLD-RTO: 0 /100 WBC
PLATELET # BLD AUTO: 238 K/UL (ref 150–450)
PMV BLD AUTO: 10.9 FL (ref 9.2–12.9)
POTASSIUM SERPL-SCNC: 4.5 MMOL/L (ref 3.5–5.1)
PROT SERPL-MCNC: 7.1 G/DL (ref 6–8.4)
RBC # BLD AUTO: 3.79 M/UL (ref 4–5.4)
SODIUM SERPL-SCNC: 130 MMOL/L (ref 136–145)
TRIGL SERPL-MCNC: 111 MG/DL (ref 30–150)
WBC # BLD AUTO: 5.65 K/UL (ref 3.9–12.7)

## 2024-08-16 PROCEDURE — 36415 COLL VENOUS BLD VENIPUNCTURE: CPT | Mod: HCNC,PO | Performed by: INTERNAL MEDICINE

## 2024-08-16 PROCEDURE — 80053 COMPREHEN METABOLIC PANEL: CPT | Mod: HCNC | Performed by: INTERNAL MEDICINE

## 2024-08-16 PROCEDURE — 85025 COMPLETE CBC W/AUTO DIFF WBC: CPT | Mod: HCNC | Performed by: INTERNAL MEDICINE

## 2024-08-16 PROCEDURE — 80061 LIPID PANEL: CPT | Mod: HCNC | Performed by: INTERNAL MEDICINE

## 2024-08-16 PROCEDURE — 83036 HEMOGLOBIN GLYCOSYLATED A1C: CPT | Mod: HCNC | Performed by: INTERNAL MEDICINE

## 2024-08-20 ENCOUNTER — PATIENT MESSAGE (OUTPATIENT)
Dept: PRIMARY CARE CLINIC | Facility: CLINIC | Age: 64
End: 2024-08-20
Payer: MEDICARE

## 2024-08-20 DIAGNOSIS — E87.1 HYPONATREMIA: Primary | ICD-10-CM

## 2024-08-21 ENCOUNTER — LAB VISIT (OUTPATIENT)
Dept: LAB | Facility: HOSPITAL | Age: 64
End: 2024-08-21
Attending: INTERNAL MEDICINE
Payer: MEDICARE

## 2024-08-21 DIAGNOSIS — E87.1 HYPONATREMIA: ICD-10-CM

## 2024-08-21 LAB — SODIUM UR-SCNC: 15 MMOL/L (ref 20–250)

## 2024-08-21 PROCEDURE — 83935 ASSAY OF URINE OSMOLALITY: CPT | Mod: HCNC | Performed by: INTERNAL MEDICINE

## 2024-08-21 PROCEDURE — 84300 ASSAY OF URINE SODIUM: CPT | Mod: HCNC | Performed by: INTERNAL MEDICINE

## 2024-08-22 LAB — OSMOLALITY UR: 151 MOSM/KG (ref 50–1200)

## 2024-08-23 ENCOUNTER — CLINICAL SUPPORT (OUTPATIENT)
Dept: PRIMARY CARE CLINIC | Facility: CLINIC | Age: 64
End: 2024-08-23
Payer: MEDICARE

## 2024-08-23 DIAGNOSIS — M81.0 OSTEOPOROSIS WITHOUT PATHOLOGICAL FRACTURE: Primary | ICD-10-CM

## 2024-08-23 PROCEDURE — 99999 PR PBB SHADOW E&M-EST. PATIENT-LVL III: CPT | Mod: PBBFAC,HCNC,,

## 2024-08-23 NOTE — PROGRESS NOTES
Pt presents for Prolia shot, identified by name and date of birth. Administered denosumab Prolia 60 mg SubQ to posterior Left upper arm via aseptic technique, no blood noted, sterile bandaid applied, tolerated well. Pt instructed to remain in clinic for 15 min of monitoring, pt verbalized understanding.

## 2024-09-13 DIAGNOSIS — E78.00 PURE HYPERCHOLESTEROLEMIA: ICD-10-CM

## 2024-09-13 DIAGNOSIS — I10 ESSENTIAL HYPERTENSION: ICD-10-CM

## 2024-09-13 DIAGNOSIS — M06.9 RHEUMATOID ARTHRITIS INVOLVING MULTIPLE JOINTS: ICD-10-CM

## 2024-09-16 RX ORDER — ATORVASTATIN CALCIUM 20 MG/1
20 TABLET, FILM COATED ORAL NIGHTLY
Qty: 90 TABLET | Refills: 3 | Status: SHIPPED | OUTPATIENT
Start: 2024-09-16

## 2024-09-16 RX ORDER — METOPROLOL TARTRATE 25 MG/1
25 TABLET, FILM COATED ORAL NIGHTLY
Qty: 90 TABLET | Refills: 3 | Status: SHIPPED | OUTPATIENT
Start: 2024-09-16

## 2024-09-16 RX ORDER — LISINOPRIL 5 MG/1
5 TABLET ORAL DAILY
Qty: 90 TABLET | Refills: 3 | Status: SHIPPED | OUTPATIENT
Start: 2024-09-16

## 2024-09-16 RX ORDER — METHOTREXATE 2.5 MG/1
12.5 TABLET ORAL
Qty: 60 TABLET | Refills: 3 | Status: SHIPPED | OUTPATIENT
Start: 2024-09-16 | End: 2025-09-16

## 2024-10-29 ENCOUNTER — OFFICE VISIT (OUTPATIENT)
Dept: PRIMARY CARE CLINIC | Facility: CLINIC | Age: 64
End: 2024-10-29
Payer: MEDICARE

## 2024-10-29 VITALS
OXYGEN SATURATION: 97 % | HEART RATE: 79 BPM | HEIGHT: 64 IN | BODY MASS INDEX: 25.56 KG/M2 | WEIGHT: 149.69 LBS | SYSTOLIC BLOOD PRESSURE: 110 MMHG | DIASTOLIC BLOOD PRESSURE: 82 MMHG

## 2024-10-29 DIAGNOSIS — J30.89 NON-SEASONAL ALLERGIC RHINITIS, UNSPECIFIED TRIGGER: ICD-10-CM

## 2024-10-29 DIAGNOSIS — I10 HYPERTENSION, UNSPECIFIED TYPE: ICD-10-CM

## 2024-10-29 DIAGNOSIS — F51.01 PRIMARY INSOMNIA: Primary | ICD-10-CM

## 2024-10-29 DIAGNOSIS — M25.552 PAIN OF LEFT HIP: ICD-10-CM

## 2024-10-29 DIAGNOSIS — M54.2 NECK PAIN: ICD-10-CM

## 2024-10-29 DIAGNOSIS — Z00.00 HEALTHCARE MAINTENANCE: ICD-10-CM

## 2024-10-29 DIAGNOSIS — R00.2 PALPITATIONS: ICD-10-CM

## 2024-10-29 DIAGNOSIS — F41.9 ANXIETY: ICD-10-CM

## 2024-10-29 DIAGNOSIS — M05.79 RHEUMATOID ARTHRITIS INVOLVING MULTIPLE SITES WITH POSITIVE RHEUMATOID FACTOR: ICD-10-CM

## 2024-10-29 PROCEDURE — 1157F ADVNC CARE PLAN IN RCRD: CPT | Mod: HCNC,CPTII,S$GLB, | Performed by: INTERNAL MEDICINE

## 2024-10-29 PROCEDURE — 1159F MED LIST DOCD IN RCRD: CPT | Mod: HCNC,CPTII,S$GLB, | Performed by: INTERNAL MEDICINE

## 2024-10-29 PROCEDURE — 1160F RVW MEDS BY RX/DR IN RCRD: CPT | Mod: HCNC,CPTII,S$GLB, | Performed by: INTERNAL MEDICINE

## 2024-10-29 PROCEDURE — 3008F BODY MASS INDEX DOCD: CPT | Mod: HCNC,CPTII,S$GLB, | Performed by: INTERNAL MEDICINE

## 2024-10-29 PROCEDURE — 3044F HG A1C LEVEL LT 7.0%: CPT | Mod: HCNC,CPTII,S$GLB, | Performed by: INTERNAL MEDICINE

## 2024-10-29 PROCEDURE — 3079F DIAST BP 80-89 MM HG: CPT | Mod: HCNC,CPTII,S$GLB, | Performed by: INTERNAL MEDICINE

## 2024-10-29 PROCEDURE — 99999 PR PBB SHADOW E&M-EST. PATIENT-LVL V: CPT | Mod: PBBFAC,HCNC,, | Performed by: INTERNAL MEDICINE

## 2024-10-29 PROCEDURE — 4010F ACE/ARB THERAPY RXD/TAKEN: CPT | Mod: HCNC,CPTII,S$GLB, | Performed by: INTERNAL MEDICINE

## 2024-10-29 PROCEDURE — 3074F SYST BP LT 130 MM HG: CPT | Mod: HCNC,CPTII,S$GLB, | Performed by: INTERNAL MEDICINE

## 2024-10-29 PROCEDURE — 99215 OFFICE O/P EST HI 40 MIN: CPT | Mod: HCNC,S$GLB,, | Performed by: INTERNAL MEDICINE

## 2024-10-29 PROCEDURE — 1126F AMNT PAIN NOTED NONE PRSNT: CPT | Mod: HCNC,CPTII,S$GLB, | Performed by: INTERNAL MEDICINE

## 2024-10-29 RX ORDER — TRIAMCINOLONE ACETONIDE 40 MG/ML
40 INJECTION, SUSPENSION INTRA-ARTICULAR; INTRAMUSCULAR ONCE
Status: DISCONTINUED | OUTPATIENT
Start: 2024-10-29 | End: 2024-10-29

## 2024-10-29 RX ORDER — MELOXICAM 7.5 MG/1
7.5 TABLET ORAL DAILY
Qty: 30 TABLET | Refills: 0 | Status: SHIPPED | OUTPATIENT
Start: 2024-10-29

## 2024-10-29 RX ORDER — METHOCARBAMOL 500 MG/1
500 TABLET, FILM COATED ORAL 2 TIMES DAILY PRN
Qty: 20 TABLET | Refills: 1 | Status: SHIPPED | OUTPATIENT
Start: 2024-10-29 | End: 2024-11-08

## 2024-11-11 DIAGNOSIS — M06.9 RHEUMATOID ARTHRITIS INVOLVING MULTIPLE JOINTS: ICD-10-CM

## 2024-11-11 RX ORDER — ETANERCEPT 50 MG/ML
50 SOLUTION SUBCUTANEOUS
Qty: 4 ML | Refills: 4 | Status: ACTIVE | OUTPATIENT
Start: 2024-11-11 | End: 2025-11-11

## 2024-11-18 ENCOUNTER — OFFICE VISIT (OUTPATIENT)
Dept: OTOLARYNGOLOGY | Facility: CLINIC | Age: 64
End: 2024-11-18
Payer: MEDICARE

## 2024-11-18 VITALS
DIASTOLIC BLOOD PRESSURE: 77 MMHG | SYSTOLIC BLOOD PRESSURE: 125 MMHG | WEIGHT: 147.06 LBS | HEART RATE: 61 BPM | BODY MASS INDEX: 25.24 KG/M2

## 2024-11-18 DIAGNOSIS — K21.9 LPRD (LARYNGOPHARYNGEAL REFLUX DISEASE): Primary | ICD-10-CM

## 2024-11-18 DIAGNOSIS — J30.9 ALLERGIC RHINITIS, UNSPECIFIED SEASONALITY, UNSPECIFIED TRIGGER: ICD-10-CM

## 2024-11-18 PROCEDURE — 3008F BODY MASS INDEX DOCD: CPT | Mod: CPTII,S$GLB,, | Performed by: OTOLARYNGOLOGY

## 2024-11-18 PROCEDURE — 1160F RVW MEDS BY RX/DR IN RCRD: CPT | Mod: CPTII,S$GLB,, | Performed by: OTOLARYNGOLOGY

## 2024-11-18 PROCEDURE — 1101F PT FALLS ASSESS-DOCD LE1/YR: CPT | Mod: CPTII,S$GLB,, | Performed by: OTOLARYNGOLOGY

## 2024-11-18 PROCEDURE — 1157F ADVNC CARE PLAN IN RCRD: CPT | Mod: CPTII,S$GLB,, | Performed by: OTOLARYNGOLOGY

## 2024-11-18 PROCEDURE — 1159F MED LIST DOCD IN RCRD: CPT | Mod: CPTII,S$GLB,, | Performed by: OTOLARYNGOLOGY

## 2024-11-18 PROCEDURE — 3074F SYST BP LT 130 MM HG: CPT | Mod: CPTII,S$GLB,, | Performed by: OTOLARYNGOLOGY

## 2024-11-18 PROCEDURE — 99204 OFFICE O/P NEW MOD 45 MIN: CPT | Mod: S$GLB,,, | Performed by: OTOLARYNGOLOGY

## 2024-11-18 PROCEDURE — 99999 PR PBB SHADOW E&M-EST. PATIENT-LVL IV: CPT | Mod: PBBFAC,,, | Performed by: OTOLARYNGOLOGY

## 2024-11-18 PROCEDURE — 4010F ACE/ARB THERAPY RXD/TAKEN: CPT | Mod: CPTII,S$GLB,, | Performed by: OTOLARYNGOLOGY

## 2024-11-18 PROCEDURE — 3044F HG A1C LEVEL LT 7.0%: CPT | Mod: CPTII,S$GLB,, | Performed by: OTOLARYNGOLOGY

## 2024-11-18 PROCEDURE — 3078F DIAST BP <80 MM HG: CPT | Mod: CPTII,S$GLB,, | Performed by: OTOLARYNGOLOGY

## 2024-11-18 PROCEDURE — 1126F AMNT PAIN NOTED NONE PRSNT: CPT | Mod: CPTII,S$GLB,, | Performed by: OTOLARYNGOLOGY

## 2024-11-18 PROCEDURE — 3288F FALL RISK ASSESSMENT DOCD: CPT | Mod: CPTII,S$GLB,, | Performed by: OTOLARYNGOLOGY

## 2024-11-18 NOTE — PROGRESS NOTES
Ms. Petit     Vitals:    24 1144   BP: 125/77   Pulse: 61       Chief Complaint:  Sinus Problem       HPI:   is a 64-year-old  female who presents referred by  for sinus issues.  She complains of runny nose and dripping in her throat as well as sneezing and headaches.  She does use Flonase however not regularly.  She has not done any saline sinus rinses.  She denies any chronic recurrent sinus infections.  She has noticed recent reflux symptoms.  She does have repeated throat clearing and does describe a globus sensation.    SNOT22- 59 NOSE- 85    Review of Systems:  Constitutional:   weight loss or weight gain: Negative  Allergy/Immunologic:   Positive  Nasal Congestion/Obstruction:   Negative, positive reported runny nose and postnasal drip  Nosebleeds:   Negative  Sinus infections:   Negative  Headache/Facial Pain:   Positive for headache  Snoring/DASHA:   Negative  Throat: Infections/Pain:   Negative, positive for cough  Hoarseness/Speech Disturbance:   Negative  Trauma Hx:  Negative    Cardiovascular:  M/I Angina: Negative  Hypertension:  Positive  Endocrine:    DM/Steroids: Negative  GI:   Dysphagia/Reflux:  Positive  :   GYN Pregnancy: Negative  Renal:   Dialysis: Negative  Lymphatic:   Neck Mass/Lymphadenopathy: Negative  Muscoloskeletal:   Negative  Hematologic:   Bleeding Disorders/Anemia: Negative  Neurologic:    Cranial/Neuralgia: Negative  Pulmonary:   Asthma/SOB/Cough: Negative  Skin Disorders: Negative    Past Medical/Surgical/Family/Social History:    ENT Surgery:  Status post septoplasty and tonsillectomy  Occupational Exposure: Negative   Problems: Negative  Cancer: Negative    Past Family History:   Family history of Cancer: Negative    Past Social History:   Tobacco: Nonsmoker   Alcohol:  1 glass of wine a few times per week Social Drinker      Allergies and medications: Reviewed per med card.    Physical Examination:  Ears:   External auditory canals:   Clear   Hearing: Grossly intact   Tympanic Membranes: Clear  Nose:   External: Normal with good valve support   Intranasal:  Septum straight, turbinates 1 to 2+, nasal airway clear at this time.  Mouth:   Intraorally: Lips, teeth, and gums: Normal   Oropharynx: Normal   Mucosa: Normal   Tongue: Normal  Throat:      Palate: Normal palate with elevation   Tonsils:  Absent   Posterior Pharynx: Normal  Fiberoptic exam: Not performed  Head/Face:     Inspection: Normal and atraumatic   Palpation/Percussion: Non tender   Facial strength: Normal and symmetric   Salivary glands: Normal  Neck: Supple  Thyroid: No masses  Lymphatics: No nodes  Respiratory:   Effort: Normal  Eyes:   Ocular Mobility: Normal   Vision: Grossly intact  Neuro/Psych:   Cranial Nerves: Grossly Intact   Orientation: Normal   Mood/Affect: Normal      Assessment/Plan:  I have discussed my findings with her in detail as well as my recommendations for treatment.  I have given her literature on saline sinus rinses including its use with distilled water only and described how this is to be used with her in detail on a daily basis.  I have also recommended that she use her Flonase nasal spray on a daily basis and I have described how this is to be administered as well.  We have also discussed reflux precautions and I have suggested that she resume her Nexium for 2 weeks.  If this does not resolve her reflux issues I have recommended that she see her GI or PCP specialists.  No anatomic abnormalities which would necessitate surgical intervention at this time.  She will return to clinic p.r.n.

## 2024-12-04 DIAGNOSIS — M06.9 RHEUMATOID ARTHRITIS INVOLVING MULTIPLE JOINTS: ICD-10-CM

## 2024-12-05 RX ORDER — FOLIC ACID 1 MG/1
1 TABLET ORAL DAILY
Qty: 90 TABLET | Refills: 1 | Status: SHIPPED | OUTPATIENT
Start: 2024-12-05 | End: 2025-12-05

## 2024-12-09 ENCOUNTER — OFFICE VISIT (OUTPATIENT)
Dept: RHEUMATOLOGY | Facility: CLINIC | Age: 64
End: 2024-12-09
Payer: MEDICARE

## 2024-12-09 ENCOUNTER — LAB VISIT (OUTPATIENT)
Dept: LAB | Facility: HOSPITAL | Age: 64
End: 2024-12-09
Attending: INTERNAL MEDICINE
Payer: MEDICARE

## 2024-12-09 VITALS
SYSTOLIC BLOOD PRESSURE: 121 MMHG | DIASTOLIC BLOOD PRESSURE: 78 MMHG | WEIGHT: 147.69 LBS | HEART RATE: 57 BPM | BODY MASS INDEX: 25.35 KG/M2

## 2024-12-09 DIAGNOSIS — R79.89 OTHER SPECIFIED ABNORMAL FINDINGS OF BLOOD CHEMISTRY: ICD-10-CM

## 2024-12-09 DIAGNOSIS — M06.9 RHEUMATOID ARTHRITIS INVOLVING MULTIPLE JOINTS: ICD-10-CM

## 2024-12-09 DIAGNOSIS — D84.821 IMMUNODEFICIENCY DUE TO DRUG THERAPY: ICD-10-CM

## 2024-12-09 DIAGNOSIS — Z79.899 IMMUNODEFICIENCY DUE TO DRUG THERAPY: ICD-10-CM

## 2024-12-09 DIAGNOSIS — M06.9 RHEUMATOID ARTHRITIS FLARE: Primary | ICD-10-CM

## 2024-12-09 DIAGNOSIS — M06.9 RHEUMATOID ARTHRITIS FLARE: ICD-10-CM

## 2024-12-09 LAB
ALBUMIN SERPL BCP-MCNC: 4.5 G/DL (ref 3.5–5.2)
ALP SERPL-CCNC: 65 U/L (ref 40–150)
ALT SERPL W/O P-5'-P-CCNC: 33 U/L (ref 10–44)
ANION GAP SERPL CALC-SCNC: 7 MMOL/L (ref 8–16)
AST SERPL-CCNC: 27 U/L (ref 10–40)
BASOPHILS # BLD AUTO: 0.04 K/UL (ref 0–0.2)
BASOPHILS NFR BLD: 0.5 % (ref 0–1.9)
BILIRUB SERPL-MCNC: 0.5 MG/DL (ref 0.1–1)
BUN SERPL-MCNC: 13 MG/DL (ref 8–23)
CALCIUM SERPL-MCNC: 10.1 MG/DL (ref 8.7–10.5)
CHLORIDE SERPL-SCNC: 108 MMOL/L (ref 95–110)
CO2 SERPL-SCNC: 24 MMOL/L (ref 23–29)
CREAT SERPL-MCNC: 0.7 MG/DL (ref 0.5–1.4)
CRP SERPL-MCNC: <0.3 MG/L (ref 0–8.2)
DIFFERENTIAL METHOD BLD: NORMAL
EOSINOPHIL # BLD AUTO: 0.2 K/UL (ref 0–0.5)
EOSINOPHIL NFR BLD: 3.2 % (ref 0–8)
ERYTHROCYTE [DISTWIDTH] IN BLOOD BY AUTOMATED COUNT: 14 % (ref 11.5–14.5)
ERYTHROCYTE [SEDIMENTATION RATE] IN BLOOD BY PHOTOMETRIC METHOD: 7 MM/HR (ref 0–36)
EST. GFR  (NO RACE VARIABLE): >60 ML/MIN/1.73 M^2
FERRITIN SERPL-MCNC: 404 NG/ML (ref 20–300)
GLUCOSE SERPL-MCNC: 104 MG/DL (ref 70–110)
HBV CORE AB SERPL QL IA: NORMAL
HBV SURFACE AG SERPL QL IA: NORMAL
HCT VFR BLD AUTO: 40.9 % (ref 37–48.5)
HGB BLD-MCNC: 13.5 G/DL (ref 12–16)
IMM GRANULOCYTES # BLD AUTO: 0.03 K/UL (ref 0–0.04)
IMM GRANULOCYTES NFR BLD AUTO: 0.4 % (ref 0–0.5)
IRON SERPL-MCNC: 89 UG/DL (ref 30–160)
LYMPHOCYTES # BLD AUTO: 2.2 K/UL (ref 1–4.8)
LYMPHOCYTES NFR BLD: 29 % (ref 18–48)
MCH RBC QN AUTO: 30.8 PG (ref 27–31)
MCHC RBC AUTO-ENTMCNC: 33 G/DL (ref 32–36)
MCV RBC AUTO: 93 FL (ref 82–98)
MONOCYTES # BLD AUTO: 0.6 K/UL (ref 0.3–1)
MONOCYTES NFR BLD: 8.2 % (ref 4–15)
NEUTROPHILS # BLD AUTO: 4.4 K/UL (ref 1.8–7.7)
NEUTROPHILS NFR BLD: 58.7 % (ref 38–73)
NRBC BLD-RTO: 0 /100 WBC
PLATELET # BLD AUTO: 284 K/UL (ref 150–450)
PMV BLD AUTO: 10.5 FL (ref 9.2–12.9)
POTASSIUM SERPL-SCNC: 4.2 MMOL/L (ref 3.5–5.1)
PROT SERPL-MCNC: 8.1 G/DL (ref 6–8.4)
RBC # BLD AUTO: 4.39 M/UL (ref 4–5.4)
SATURATED IRON: 21 % (ref 20–50)
SODIUM SERPL-SCNC: 139 MMOL/L (ref 136–145)
TOTAL IRON BINDING CAPACITY: 417 UG/DL (ref 250–450)
TRANSFERRIN SERPL-MCNC: 282 MG/DL (ref 200–375)
WBC # BLD AUTO: 7.52 K/UL (ref 3.9–12.7)

## 2024-12-09 PROCEDURE — 83540 ASSAY OF IRON: CPT | Mod: HCNC | Performed by: INTERNAL MEDICINE

## 2024-12-09 PROCEDURE — 1159F MED LIST DOCD IN RCRD: CPT | Mod: HCNC,CPTII,S$GLB, | Performed by: INTERNAL MEDICINE

## 2024-12-09 PROCEDURE — 1125F AMNT PAIN NOTED PAIN PRSNT: CPT | Mod: HCNC,CPTII,S$GLB, | Performed by: INTERNAL MEDICINE

## 2024-12-09 PROCEDURE — 85025 COMPLETE CBC W/AUTO DIFF WBC: CPT | Mod: HCNC | Performed by: INTERNAL MEDICINE

## 2024-12-09 PROCEDURE — 86140 C-REACTIVE PROTEIN: CPT | Mod: HCNC | Performed by: INTERNAL MEDICINE

## 2024-12-09 PROCEDURE — 80053 COMPREHEN METABOLIC PANEL: CPT | Mod: HCNC | Performed by: INTERNAL MEDICINE

## 2024-12-09 PROCEDURE — 3044F HG A1C LEVEL LT 7.0%: CPT | Mod: HCNC,CPTII,S$GLB, | Performed by: INTERNAL MEDICINE

## 2024-12-09 PROCEDURE — 36415 COLL VENOUS BLD VENIPUNCTURE: CPT | Mod: HCNC | Performed by: INTERNAL MEDICINE

## 2024-12-09 PROCEDURE — 1157F ADVNC CARE PLAN IN RCRD: CPT | Mod: HCNC,CPTII,S$GLB, | Performed by: INTERNAL MEDICINE

## 2024-12-09 PROCEDURE — 86704 HEP B CORE ANTIBODY TOTAL: CPT | Mod: HCNC | Performed by: INTERNAL MEDICINE

## 2024-12-09 PROCEDURE — 82728 ASSAY OF FERRITIN: CPT | Mod: HCNC | Performed by: INTERNAL MEDICINE

## 2024-12-09 PROCEDURE — 3074F SYST BP LT 130 MM HG: CPT | Mod: HCNC,CPTII,S$GLB, | Performed by: INTERNAL MEDICINE

## 2024-12-09 PROCEDURE — 3078F DIAST BP <80 MM HG: CPT | Mod: HCNC,CPTII,S$GLB, | Performed by: INTERNAL MEDICINE

## 2024-12-09 PROCEDURE — 3288F FALL RISK ASSESSMENT DOCD: CPT | Mod: HCNC,CPTII,S$GLB, | Performed by: INTERNAL MEDICINE

## 2024-12-09 PROCEDURE — 1101F PT FALLS ASSESS-DOCD LE1/YR: CPT | Mod: HCNC,CPTII,S$GLB, | Performed by: INTERNAL MEDICINE

## 2024-12-09 PROCEDURE — 4010F ACE/ARB THERAPY RXD/TAKEN: CPT | Mod: HCNC,CPTII,S$GLB, | Performed by: INTERNAL MEDICINE

## 2024-12-09 PROCEDURE — 87340 HEPATITIS B SURFACE AG IA: CPT | Mod: HCNC | Performed by: INTERNAL MEDICINE

## 2024-12-09 PROCEDURE — 99999 PR PBB SHADOW E&M-EST. PATIENT-LVL IV: CPT | Mod: PBBFAC,HCNC,, | Performed by: INTERNAL MEDICINE

## 2024-12-09 PROCEDURE — 85652 RBC SED RATE AUTOMATED: CPT | Mod: HCNC | Performed by: INTERNAL MEDICINE

## 2024-12-09 PROCEDURE — 99215 OFFICE O/P EST HI 40 MIN: CPT | Mod: HCNC,S$GLB,, | Performed by: INTERNAL MEDICINE

## 2024-12-09 PROCEDURE — 3008F BODY MASS INDEX DOCD: CPT | Mod: HCNC,CPTII,S$GLB, | Performed by: INTERNAL MEDICINE

## 2024-12-09 RX ORDER — ADALIMUMAB 40MG/0.4ML
40 KIT SUBCUTANEOUS
Qty: 2 PEN | Refills: 11 | Status: ACTIVE | OUTPATIENT
Start: 2024-12-09 | End: 2025-03-03

## 2024-12-09 RX ORDER — METHOTREXATE 2.5 MG/1
12.5 TABLET ORAL
Qty: 60 TABLET | Refills: 3 | Status: SHIPPED | OUTPATIENT
Start: 2024-12-09 | End: 2025-12-09

## 2024-12-09 RX ORDER — DORZOLAMIDE HYDROCHLORIDE AND TIMOLOL MALEATE 20; 5 MG/ML; MG/ML
1 SOLUTION/ DROPS OPHTHALMIC 2 TIMES DAILY
COMMUNITY
Start: 2024-10-29

## 2024-12-09 NOTE — PROGRESS NOTES
Subjective:       Patient ID: Earnestine Petit is a 61 y.o. female.    Chief Complaint: Disease Management    HPI 61 year old F with PMH of RA (around age 57), osteoporosis, palpitations here for evaluation.  When she was fist diagnosed with RA, she had involvement of shoulders, elbows, hands,wrists, knees, ankle, and feet.  She was started on MTX and prednisone at time of diagnosis.  She is taking MTX 3 pills once a week for a year and also on enbrel for over 3 years. She is taking folic acid 1 mg a day.  She has mild pain in right knee, right wrist, and some pain in hands with making a fist. She also has pain in feet. She reports she gets swelling in left second finger and other fingers on left hand. On occasion, her right knee will get swollen.  Resting improves the pain. Denies any rashes, oral ulcers, hair loss, fevers,or photosensitivity. She smoked when she was 14 just socially.      She is on prolia for a year and half for osteoporosis. She was on avista for 3 years.  She has had osteoporosis since age 52.    Family hx: sister: RA  Aunt: RA    Interval history: She is taking MTX 5  pills once a week. She is taking folic acid  1 mg a day.  She is on Enbrel.   She has pain in right knee. She reports worsening pain and swelling in hands.    Past Medical History:   Diagnosis Date    Arthritis     RH    Hyperlipidemia     Hypertension     Osteoporosis        Review of Systems   Constitutional: Negative for activity change, appetite change, chills, diaphoresis, fatigue, fever and unexpected weight change.   HENT: Negative for congestion, ear discharge, ear pain, facial swelling, mouth sores, sinus pressure, sneezing, sore throat, tinnitus and trouble swallowing.    Eyes: Negative for photophobia, pain, discharge, redness, itching and visual disturbance.   Respiratory: Negative for apnea, cough, chest tightness, shortness of breath, wheezing and stridor.    Cardiovascular: Negative for chest pain and leg swelling.    Gastrointestinal: Negative for abdominal distention, abdominal pain, anal bleeding, blood in stool, constipation, diarrhea and nausea.   Endocrine: Negative for cold intolerance and heat intolerance.   Genitourinary: Negative for difficulty urinating, dysuria and genital sores.   Musculoskeletal: Positive for arthralgias. Negative for back pain, gait problem, joint swelling, myalgias, neck pain and neck stiffness.   Skin: Negative for color change, pallor, rash and wound.   Neurological: Negative for dizziness, seizures, light-headedness, numbness and headaches.   Hematological: Negative for adenopathy. Does not bruise/bleed easily.   Psychiatric/Behavioral: Negative for sleep disturbance. The patient is not nervous/anxious.            Objective:        Physical Exam   Constitutional: She is oriented to person, place, and time.   HENT:   Head: Normocephalic and atraumatic.   Right Ear: External ear normal.   Left Ear: External ear normal.   Nose: Nose normal.   Mouth/Throat: Oropharynx is clear and moist. No oropharyngeal exudate.   Eyes: Conjunctivae and EOM are normal. Pupils are equal, round, and reactive to light. Right eye exhibits no discharge. Left eye exhibits no discharge. No scleral icterus.   Neck: No JVD present. No thyromegaly present.   Cardiovascular: Normal rate, regular rhythm, normal heart sounds and intact distal pulses.  Exam reveals no gallop and no friction rub.    No murmur heard.  Pulmonary/Chest: Effort normal and breath sounds normal. No respiratory distress. She has no wheezes. She has no rales. She exhibits no tenderness.   Abdominal: Soft. Bowel sounds are normal. She exhibits no distension and no mass. There is no abdominal tenderness. There is no rebound and no guarding.   Lymphadenopathy:     She has no cervical adenopathy.   Neurological: She is alert and oriented to person, place, and time. No cranial nerve deficit. Gait normal. Coordination normal.   Skin: Skin is dry. No rash  noted. No erythema. No pallor.     Psychiatric: Affect and judgment normal.   Musculoskeletal: Deformity present. No tenderness or edema.           Assessment:     64   year old F with PMH of RA (around age 57), osteoporosis, palpitations here for evaluation.   She is on MTX  5 pills once a week and Enbrel and is flaring so will switch to Humira.    -continue MTX 5 pills once a week  -continue folic acid 1 mg po qday  -start Humira 40mg sub q every 14 days (Risks of TNF inhibitor discussed with patient and not limited to cell count abnormalities, malignancy, allergic  reaction to medication, and increased risk of infection. Patient agrees with starting medication.  Labs today and in 3 months        Immunodeficiency due to drug-   -monitor carefully for infection and any toxicities associated with immunosuppressants  -advised age appropriate cancer screenings including yearly skin exam and age appropriate vaccinations  -advised to seek immediate care in the setting of infection and hold immunosuppressive medications if there is infection    45* minutes of total time spent on the encounter, which includes face to face time and non-face to face time preparing to see the patient (eg, review of tests), Obtaining and/or reviewing separately obtained history, Documenting clinical information in the electronic or other health record, Independently interpreting results (not separately reported) and communicating results to the patient/family/caregiver, or Care coordination (not separately reported).

## 2024-12-16 ENCOUNTER — PATIENT MESSAGE (OUTPATIENT)
Dept: RHEUMATOLOGY | Facility: CLINIC | Age: 64
End: 2024-12-16
Payer: MEDICARE

## 2024-12-30 ENCOUNTER — NURSE TRIAGE (OUTPATIENT)
Dept: ADMINISTRATIVE | Facility: CLINIC | Age: 64
End: 2024-12-30
Payer: MEDICARE

## 2024-12-30 NOTE — TELEPHONE ENCOUNTER
C/O Left leg pain. It started awhile back, comes and goes. PCP said it sounded like bursitis. Ortho appointment needed to be scheduled. The last 3 weeks, pain came back. Throbbing, wakes her up. States on buttocks, on side of leg, goes numb and comes back. Started feeling better and declined ortho. Now would like appointment. Triage done-dispo see in 2 weeks. Will send message, and transferred back to  to make appointment.   Reason for Disposition   MILD pain (e.g., does not interfere with normal activities) and present > 7 days    Additional Information   Negative: Looks like a broken bone or dislocated joint (e.g., crooked or deformed)   Negative: Sounds like a life-threatening emergency to the triager   Negative: Followed a hip injury   Negative: Followed a knee injury   Negative: Followed an ankle injury   Negative: Back pain radiating (shooting) into leg(s)   Negative: Foot pain is main symptom   Negative: Ankle pain is main symptom   Negative: Chest pain   Negative: Difficulty breathing   Negative: Entire foot is cool or blue in comparison to other side   Negative: Unable to walk   Negative: Fever and red area (or area very tender to touch)   Negative: Swollen joint and fever   Negative: Thigh or calf pain in only one leg and present > 1 hour   Negative: Thigh, calf, or ankle swelling in only one leg   Negative: Thigh, calf, or ankle swelling in both legs, but one side is definitely more swollen   Negative: History of prior 'blood clot' in leg or lungs (i.e., deep vein thrombosis, pulmonary embolism)   Negative: History of inherited increased risk of blood clots (e.g., factor 5 Leiden, antithrombin 3, protein C or protein S deficiency, prothrombin mutation)   Negative: Major surgery in past month   Negative: Hip or leg fracture (broken bone) in past month (or had cast on leg or ankle in past month)   Negative: Illness requiring prolonged bedrest in past month (e.g., immobilization, long hospital  stay)   Negative: Long-distance travel in past month (e.g., car, bus, train, plane; with trip lasting 6 or more hours)   Negative: Cancer treatment in past six months (or has cancer now)   Negative: Patient sounds very sick or weak to the triager   Negative: SEVERE pain (e.g., excruciating, unable to do any normal activities)   Negative: Cast on leg or ankle and now has increasing pain   Negative: Red area or streak > 2 inches (or 5 cm)   Negative: Painful rash with multiple small blisters grouped together (i.e., dermatomal distribution or 'band' or 'stripe')   Negative: Looks like a boil, infected sore, deep ulcer, or other infected rash (spreading redness, pus)   Negative: Localized rash is very painful (no fever)   Negative: Numbness in a leg or foot (i.e., loss of sensation)   Negative: Localized pain, redness or hard lump along vein   Negative: Patient wants to be seen   Negative: MODERATE pain (e.g., interferes with normal activities, limping) and present > 3 days   Negative: Swollen joint with no fever or redness   Negative: Leg pain which occurs after walking a certain distance and disappears with rest, AND age > 50   Negative: Leg pain in shins (front of lower legs) and it occurs with running or jumping exercise (e.g., jogging, basketball)    Protocols used: Leg Pain-A-[place]

## 2025-01-02 ENCOUNTER — PATIENT MESSAGE (OUTPATIENT)
Dept: ORTHOPEDICS | Facility: CLINIC | Age: 65
End: 2025-01-02
Payer: MEDICARE

## 2025-01-02 ENCOUNTER — TELEPHONE (OUTPATIENT)
Dept: OBSTETRICS AND GYNECOLOGY | Facility: CLINIC | Age: 65
End: 2025-01-02
Payer: MEDICARE

## 2025-01-09 DIAGNOSIS — M06.9 RHEUMATOID ARTHRITIS FLARE: Primary | ICD-10-CM

## 2025-01-10 RX ORDER — ETANERCEPT 50 MG/ML
50 SOLUTION SUBCUTANEOUS WEEKLY
Qty: 4 ML | Refills: 11 | Status: SHIPPED | OUTPATIENT
Start: 2025-01-10 | End: 2026-01-10

## 2025-01-23 ENCOUNTER — PATIENT MESSAGE (OUTPATIENT)
Dept: ADMINISTRATIVE | Facility: OTHER | Age: 65
End: 2025-01-23
Payer: MEDICARE

## 2025-01-30 DIAGNOSIS — Z00.00 ENCOUNTER FOR MEDICARE ANNUAL WELLNESS EXAM: ICD-10-CM

## 2025-02-04 ENCOUNTER — LAB VISIT (OUTPATIENT)
Dept: LAB | Facility: HOSPITAL | Age: 65
End: 2025-02-04
Attending: INTERNAL MEDICINE
Payer: MEDICARE

## 2025-02-04 DIAGNOSIS — R00.2 PALPITATIONS: ICD-10-CM

## 2025-02-04 DIAGNOSIS — M05.79 RHEUMATOID ARTHRITIS INVOLVING MULTIPLE SITES WITH POSITIVE RHEUMATOID FACTOR: ICD-10-CM

## 2025-02-04 DIAGNOSIS — M25.552 PAIN OF LEFT HIP: ICD-10-CM

## 2025-02-04 LAB
ALBUMIN SERPL BCP-MCNC: 4.3 G/DL (ref 3.5–5.2)
ALP SERPL-CCNC: 66 U/L (ref 40–150)
ALT SERPL W/O P-5'-P-CCNC: 29 U/L (ref 10–44)
ANION GAP SERPL CALC-SCNC: 11 MMOL/L (ref 8–16)
AST SERPL-CCNC: 26 U/L (ref 10–40)
BASOPHILS # BLD AUTO: 0.06 K/UL (ref 0–0.2)
BASOPHILS NFR BLD: 0.6 % (ref 0–1.9)
BILIRUB SERPL-MCNC: 0.5 MG/DL (ref 0.1–1)
BUN SERPL-MCNC: 15 MG/DL (ref 8–23)
CALCIUM SERPL-MCNC: 10.1 MG/DL (ref 8.7–10.5)
CHLORIDE SERPL-SCNC: 107 MMOL/L (ref 95–110)
CO2 SERPL-SCNC: 21 MMOL/L (ref 23–29)
CREAT SERPL-MCNC: 0.8 MG/DL (ref 0.5–1.4)
CRP SERPL-MCNC: 1.1 MG/L (ref 0–8.2)
DIFFERENTIAL METHOD BLD: ABNORMAL
EOSINOPHIL # BLD AUTO: 0.2 K/UL (ref 0–0.5)
EOSINOPHIL NFR BLD: 1.7 % (ref 0–8)
ERYTHROCYTE [DISTWIDTH] IN BLOOD BY AUTOMATED COUNT: 14.6 % (ref 11.5–14.5)
ERYTHROCYTE [SEDIMENTATION RATE] IN BLOOD BY PHOTOMETRIC METHOD: 4 MM/HR (ref 0–36)
EST. GFR  (NO RACE VARIABLE): >60 ML/MIN/1.73 M^2
GLUCOSE SERPL-MCNC: 104 MG/DL (ref 70–110)
HCT VFR BLD AUTO: 38.2 % (ref 37–48.5)
HGB BLD-MCNC: 12.5 G/DL (ref 12–16)
IMM GRANULOCYTES # BLD AUTO: 0.03 K/UL (ref 0–0.04)
IMM GRANULOCYTES NFR BLD AUTO: 0.3 % (ref 0–0.5)
LYMPHOCYTES # BLD AUTO: 2 K/UL (ref 1–4.8)
LYMPHOCYTES NFR BLD: 19.4 % (ref 18–48)
MCH RBC QN AUTO: 31.5 PG (ref 27–31)
MCHC RBC AUTO-ENTMCNC: 32.7 G/DL (ref 32–36)
MCV RBC AUTO: 96 FL (ref 82–98)
MONOCYTES # BLD AUTO: 0.8 K/UL (ref 0.3–1)
MONOCYTES NFR BLD: 7.8 % (ref 4–15)
NEUTROPHILS # BLD AUTO: 7.3 K/UL (ref 1.8–7.7)
NEUTROPHILS NFR BLD: 70.2 % (ref 38–73)
NRBC BLD-RTO: 0 /100 WBC
PLATELET # BLD AUTO: 247 K/UL (ref 150–450)
PMV BLD AUTO: 11.5 FL (ref 9.2–12.9)
POTASSIUM SERPL-SCNC: 4.4 MMOL/L (ref 3.5–5.1)
PROT SERPL-MCNC: 8 G/DL (ref 6–8.4)
RBC # BLD AUTO: 3.97 M/UL (ref 4–5.4)
RHEUMATOID FACT SERPL-ACNC: 98 IU/ML (ref 0–15)
SODIUM SERPL-SCNC: 139 MMOL/L (ref 136–145)
TSH SERPL DL<=0.005 MIU/L-ACNC: 1.89 UIU/ML (ref 0.4–4)
WBC # BLD AUTO: 10.43 K/UL (ref 3.9–12.7)

## 2025-02-04 PROCEDURE — 85025 COMPLETE CBC W/AUTO DIFF WBC: CPT | Mod: HCNC | Performed by: INTERNAL MEDICINE

## 2025-02-04 PROCEDURE — 86431 RHEUMATOID FACTOR QUANT: CPT | Mod: HCNC | Performed by: INTERNAL MEDICINE

## 2025-02-04 PROCEDURE — 36415 COLL VENOUS BLD VENIPUNCTURE: CPT | Mod: HCNC,PO | Performed by: INTERNAL MEDICINE

## 2025-02-04 PROCEDURE — 85652 RBC SED RATE AUTOMATED: CPT | Mod: HCNC | Performed by: INTERNAL MEDICINE

## 2025-02-04 PROCEDURE — 86140 C-REACTIVE PROTEIN: CPT | Mod: HCNC | Performed by: INTERNAL MEDICINE

## 2025-02-04 PROCEDURE — 86038 ANTINUCLEAR ANTIBODIES: CPT | Mod: HCNC | Performed by: INTERNAL MEDICINE

## 2025-02-04 PROCEDURE — 80053 COMPREHEN METABOLIC PANEL: CPT | Mod: HCNC | Performed by: INTERNAL MEDICINE

## 2025-02-04 PROCEDURE — 84443 ASSAY THYROID STIM HORMONE: CPT | Mod: HCNC | Performed by: INTERNAL MEDICINE

## 2025-02-05 LAB — ANA SER QL IF: NORMAL

## 2025-02-06 ENCOUNTER — OFFICE VISIT (OUTPATIENT)
Dept: CARDIOLOGY | Facility: CLINIC | Age: 65
End: 2025-02-06
Payer: MEDICARE

## 2025-02-06 VITALS
SYSTOLIC BLOOD PRESSURE: 128 MMHG | BODY MASS INDEX: 25.85 KG/M2 | WEIGHT: 150.56 LBS | HEART RATE: 64 BPM | DIASTOLIC BLOOD PRESSURE: 88 MMHG

## 2025-02-06 DIAGNOSIS — R00.2 PALPITATIONS: ICD-10-CM

## 2025-02-06 DIAGNOSIS — E78.00 PURE HYPERCHOLESTEROLEMIA: Primary | ICD-10-CM

## 2025-02-06 DIAGNOSIS — I10 PRIMARY HYPERTENSION: ICD-10-CM

## 2025-02-06 PROBLEM — E55.9 VITAMIN D DEFICIENCY: Status: ACTIVE | Noted: 2021-01-30

## 2025-02-06 LAB
OHS QRS DURATION: 96 MS
OHS QTC CALCULATION: 395 MS

## 2025-02-06 PROCEDURE — 3074F SYST BP LT 130 MM HG: CPT | Mod: CPTII,S$GLB,, | Performed by: INTERNAL MEDICINE

## 2025-02-06 PROCEDURE — 1159F MED LIST DOCD IN RCRD: CPT | Mod: CPTII,S$GLB,, | Performed by: INTERNAL MEDICINE

## 2025-02-06 PROCEDURE — 3008F BODY MASS INDEX DOCD: CPT | Mod: CPTII,S$GLB,, | Performed by: INTERNAL MEDICINE

## 2025-02-06 PROCEDURE — 3079F DIAST BP 80-89 MM HG: CPT | Mod: CPTII,S$GLB,, | Performed by: INTERNAL MEDICINE

## 2025-02-06 PROCEDURE — 3288F FALL RISK ASSESSMENT DOCD: CPT | Mod: CPTII,S$GLB,, | Performed by: INTERNAL MEDICINE

## 2025-02-06 PROCEDURE — 1126F AMNT PAIN NOTED NONE PRSNT: CPT | Mod: CPTII,S$GLB,, | Performed by: INTERNAL MEDICINE

## 2025-02-06 PROCEDURE — 99203 OFFICE O/P NEW LOW 30 MIN: CPT | Mod: 25,S$GLB,, | Performed by: INTERNAL MEDICINE

## 2025-02-06 PROCEDURE — 99999 PR PBB SHADOW E&M-EST. PATIENT-LVL IV: CPT | Mod: PBBFAC,,, | Performed by: INTERNAL MEDICINE

## 2025-02-06 PROCEDURE — 1101F PT FALLS ASSESS-DOCD LE1/YR: CPT | Mod: CPTII,S$GLB,, | Performed by: INTERNAL MEDICINE

## 2025-02-06 PROCEDURE — 1157F ADVNC CARE PLAN IN RCRD: CPT | Mod: CPTII,S$GLB,, | Performed by: INTERNAL MEDICINE

## 2025-02-06 PROCEDURE — 93000 ELECTROCARDIOGRAM COMPLETE: CPT | Mod: S$GLB,,, | Performed by: INTERNAL MEDICINE

## 2025-02-06 NOTE — PROGRESS NOTES
Subjective:   02/06/2025     Patient ID:  Earnestine Petit is a 64 y.o. female who presents for evaulation of Palpitations and Hypertension      Comes in for cardiac evaluation of palpitations, these have been present for many years.  She had been evaluated by Dr. Vazquez in the past with monitors and other cardiac tests, she has never had any heart abnormalities.  She continues to have these episodes of palpitations when she is stressed, she gets headaches and her face turns red, she feels dizzy.  The palpitations occur once or twice a month.  She does not drink caffeine.  Her blood pressure appears to be well controlled on therapy with lisinopril and metoprolol.  She is on statin therapy with atorvastatin.        Past Medical History:   Diagnosis Date    Allergy     Arthritis     RH    Cataract     Glaucoma     Hyperlipidemia     Hypertension     Osteoarthritis     Osteoporosis     Palpitations     Rheumatoid arthritis     Seasonal allergies        Review of patient's allergies indicates:   Allergen Reactions    Cat dander Other (See Comments)     Eyes itching and sneezing     Dog dander Other (See Comments)     Eyes starting itching and sneezing          Current Outpatient Medications:     atorvastatin (LIPITOR) 20 MG tablet, TAKE 1 TABLET (20 MG TOTAL) BY MOUTH EVERY EVENING., Disp: 90 tablet, Rfl: 3    calcium carbonate (OS-ALIYAH) 600 mg calcium (1,500 mg) Tab, Take 1,200 mg by mouth once daily at 6am., Disp: , Rfl:     cetirizine (ZYRTEC) 10 MG tablet, Take 10 mg by mouth once daily., Disp: , Rfl:     cholecalciferol, vitamin D3, (VITAMIN D3) 50 mcg (2,000 unit) Cap, Take 1 tablet by mouth once daily., Disp: , Rfl:     denosumab (PROLIA) 60 mg/mL Syrg, Inject 1 mL (60 mg total) into the skin every 6 (six) months., Disp: 1 mL, Rfl: 6    dorzolamide-timolol 2-0.5% (COSOPT) 22.3-6.8 mg/mL ophthalmic solution, Place 1 drop into both eyes 2 (two) times daily., Disp: , Rfl:     etanercept (ENBREL SURECLICK) 50 mg/mL (1 mL),  Inject 1 mL (50 mg total) into the skin once a week., Disp: 4 mL, Rfl: 11    ferrous fumarate/vit Bcomp,C (SUPER B COMPLEX ORAL), Take 1 tablet by mouth., Disp: , Rfl:     fluticasone propionate (FLONASE) 50 mcg/actuation nasal spray, 2 sprays (100 mcg total) by Each Nostril route once daily., Disp: 16 g, Rfl: 12    folic acid (FOLVITE) 1 MG tablet, TAKE 1 TABLET (1 MG TOTAL) BY MOUTH ONCE DAILY., Disp: 90 tablet, Rfl: 1    glucosamine HCl 1,500 mg Tab, Take 1 tablet by mouth., Disp: , Rfl:     Lactobac no.41/Bifidobact no.7 (PROBIOTIC-10 ORAL), Take 1 capsule by mouth., Disp: , Rfl:     latanoprost 0.005 % ophthalmic solution, PLACE ONE DROP IN EACH EYE AT BEDTIME, Disp: , Rfl:     lisinopriL (PRINIVIL,ZESTRIL) 5 MG tablet, TAKE 1 TABLET (5 MG TOTAL) BY MOUTH ONCE DAILY., Disp: 90 tablet, Rfl: 3    magnesium glycinate 100 mg Tab, Take 1 tablet by mouth nightly., Disp: 30 tablet, Rfl: 11    melatonin 5 mg Cap, Take 1 capsule by mouth every evening., Disp: , Rfl:     meloxicam (MOBIC) 7.5 MG tablet, Take 1 tablet (7.5 mg total) by mouth once daily. May increase to 2 tablets if needed for pain., Disp: 30 tablet, Rfl: 0    methotrexate 2.5 MG Tab, Take 5 tablets (12.5 mg total) by mouth every 7 days., Disp: 60 tablet, Rfl: 3    metoprolol tartrate (LOPRESSOR) 25 MG tablet, TAKE 1 TABLET (25 MG TOTAL) BY MOUTH NIGHTLY., Disp: 90 tablet, Rfl: 3    traZODone (DESYREL) 50 MG tablet, Take 2 tablets (100 mg total) by mouth every evening., Disp: 180 tablet, Rfl: 2    azelastine (ASTELIN) 137 mcg (0.1 %) nasal spray, 2 sprays (274 mcg total) by Nasal route 2 (two) times daily., Disp: 30 mL, Rfl: 3     Objective:   Review of Systems   Cardiovascular:  Positive for irregular heartbeat and palpitations. Negative for chest pain, claudication, cyanosis, dyspnea on exertion, leg swelling, near-syncope, orthopnea, paroxysmal nocturnal dyspnea and syncope.         Vitals:    02/06/25 1020   BP: 128/88   Pulse: 64     Wt Readings from  Last 3 Encounters:   02/06/25 68.3 kg (150 lb 9.2 oz)   12/09/24 67 kg (147 lb 11.3 oz)   11/18/24 66.7 kg (147 lb 0.8 oz)     Temp Readings from Last 3 Encounters:   02/28/24 98.6 °F (37 °C) (Oral)   12/17/22 98.1 °F (36.7 °C)   06/13/22 98 °F (36.7 °C) (Oral)     BP Readings from Last 3 Encounters:   02/06/25 128/88   12/09/24 121/78   11/18/24 125/77     Pulse Readings from Last 3 Encounters:   02/06/25 64   12/09/24 (!) 57   11/18/24 61             Physical Exam  Vitals reviewed.   Constitutional:       General: She is not in acute distress.     Appearance: She is well-developed.   HENT:      Head: Normocephalic and atraumatic.      Nose: Nose normal.   Eyes:      Conjunctiva/sclera: Conjunctivae normal.      Pupils: Pupils are equal, round, and reactive to light.   Neck:      Vascular: No JVD.   Cardiovascular:      Rate and Rhythm: Normal rate and regular rhythm.      Pulses: Intact distal pulses.      Heart sounds: Normal heart sounds. No murmur heard.     No friction rub. No gallop.   Pulmonary:      Effort: Pulmonary effort is normal. No respiratory distress.      Breath sounds: Normal breath sounds. No wheezing or rales.   Chest:      Chest wall: No tenderness.   Abdominal:      General: Bowel sounds are normal. There is no distension.      Palpations: Abdomen is soft.      Tenderness: There is no abdominal tenderness.   Musculoskeletal:         General: No tenderness or deformity. Normal range of motion.      Cervical back: Normal range of motion and neck supple.      Right lower leg: No edema.      Left lower leg: No edema.   Skin:     General: Skin is warm and dry.      Findings: No erythema or rash.   Neurological:      Mental Status: She is alert and oriented to person, place, and time.      Cranial Nerves: No cranial nerve deficit.      Motor: No abnormal muscle tone.      Coordination: Coordination normal.   Psychiatric:         Behavior: Behavior normal.         Thought Content: Thought content  normal.         Judgment: Judgment normal.           Lab Results   Component Value Date    CHOL 153 08/16/2024    CHOL 173 03/10/2023    CHOL 152 07/06/2022     Lab Results   Component Value Date    HDL 48 08/16/2024    HDL 69 03/10/2023    HDL 55 07/06/2022     Lab Results   Component Value Date    LDLCALC 82.8 08/16/2024    LDLCALC 88.2 03/10/2023    LDLCALC 77 07/06/2022     Lab Results   Component Value Date    ALT 29 02/04/2025    AST 26 02/04/2025    AST 27 12/09/2024    AST 25 08/16/2024     Lab Results   Component Value Date    CREATININE 0.8 02/04/2025    BUN 15 02/04/2025     02/04/2025    K 4.4 02/04/2025    CO2 21 (L) 02/04/2025    CO2 24 12/09/2024    CO2 20 (L) 08/16/2024     Lab Results   Component Value Date    HGB 12.5 02/04/2025    HCT 38.2 02/04/2025    HCT 40.9 12/09/2024    HCT 36.9 (L) 08/16/2024           Laboratory reviewed    EKG today shows normal sinus rhythm, normal EKG      Assessment and Plan:     Palpitations  -     Ambulatory referral/consult to Cardiology  -     IN OFFICE EKG 12-LEAD (to [facility])       Patient with palpitations for years feel secondary to stress.  She has had this extensively evaluated in the past, evaluations were unremarkable.      Given the frequency of these episodes, would not feel that a cardiac monitor would be likely to be helpful in diagnoses.  I think she would benefit from having a cardia device, Alive[employer] monitor at home and she can record her heart rhythms and let me know if they would be abnormal.      She agrees with this approach and will let me know if she has any irregular rhythm.      No follow-ups on file.          Future Appointments   Date Time Provider Department Center   2/14/2025  8:20 AM Vesta Peña MD [id number] [place]   3/10/2025 11:00 AM [facility], [facility] [place]   4/30/2025  2:00 PM [facility]

## 2025-02-14 ENCOUNTER — OFFICE VISIT (OUTPATIENT)
Dept: PRIMARY CARE CLINIC | Facility: CLINIC | Age: 65
End: 2025-02-14
Payer: MEDICARE

## 2025-02-14 VITALS
SYSTOLIC BLOOD PRESSURE: 116 MMHG | WEIGHT: 148.25 LBS | BODY MASS INDEX: 25.31 KG/M2 | DIASTOLIC BLOOD PRESSURE: 88 MMHG | OXYGEN SATURATION: 100 % | HEART RATE: 59 BPM | HEIGHT: 64 IN

## 2025-02-14 DIAGNOSIS — Z12.31 ENCOUNTER FOR SCREENING MAMMOGRAM FOR MALIGNANT NEOPLASM OF BREAST: ICD-10-CM

## 2025-02-14 DIAGNOSIS — K21.9 GASTROESOPHAGEAL REFLUX DISEASE, UNSPECIFIED WHETHER ESOPHAGITIS PRESENT: ICD-10-CM

## 2025-02-14 DIAGNOSIS — I10 HYPERTENSION, UNSPECIFIED TYPE: Primary | ICD-10-CM

## 2025-02-14 DIAGNOSIS — M05.79 RHEUMATOID ARTHRITIS INVOLVING MULTIPLE SITES WITH POSITIVE RHEUMATOID FACTOR: ICD-10-CM

## 2025-02-14 DIAGNOSIS — Z00.00 HEALTHCARE MAINTENANCE: ICD-10-CM

## 2025-02-14 DIAGNOSIS — M81.0 OSTEOPOROSIS WITHOUT PATHOLOGICAL FRACTURE: ICD-10-CM

## 2025-02-14 DIAGNOSIS — E78.00 PURE HYPERCHOLESTEROLEMIA: ICD-10-CM

## 2025-02-14 DIAGNOSIS — F51.01 PRIMARY INSOMNIA: ICD-10-CM

## 2025-02-14 DIAGNOSIS — R73.9 HYPERGLYCEMIA: ICD-10-CM

## 2025-02-14 PROCEDURE — 99999 PR PBB SHADOW E&M-EST. PATIENT-LVL V: CPT | Mod: PBBFAC,HCNC,, | Performed by: INTERNAL MEDICINE

## 2025-02-14 RX ORDER — ATORVASTATIN CALCIUM 20 MG/1
40 TABLET, FILM COATED ORAL NIGHTLY
Qty: 90 TABLET | Refills: 3 | Status: SHIPPED | OUTPATIENT
Start: 2025-02-14 | End: 2025-02-14

## 2025-02-14 RX ORDER — ATORVASTATIN CALCIUM 40 MG/1
40 TABLET, FILM COATED ORAL NIGHTLY
Qty: 90 TABLET | Refills: 3 | Status: SHIPPED | OUTPATIENT
Start: 2025-02-14

## 2025-02-14 NOTE — ASSESSMENT & PLAN NOTE
- Reviewed bone density study results from 2023, which confirmed osteoporosis of the hip.  - Evaluated that the patient's condition has not improved to osteopenia.  - Explained the mechanism of action for Prolia and anabolic bone-forming medications.  - Discussed the importance of weight-bearing exercises for bone health.  - Advised on the potential long-term effects of proton pump inhibitors on bone health and magnesium absorption.  - Continued Prolia injections for osteoporosis treatment.  - Recommend continuing OTC vitamin D and calcium supplements.  - Ordered a repeat bone density study.  - Planned to consult if the bone density study shows worsening.  - Discussed the possibility of switching to anabolic medications if Prolia proves ineffective.

## 2025-02-14 NOTE — PROGRESS NOTES
Subjective:       Patient ID: Earnestine Petit is a 64 y.o. female.    Chief Complaint: Results      HPI  Earnestine Petit is a 64 y.o. female with  rheumatoid arthritis,hypertension, hyperlipidemia, osteoarthritis, insomnia, anxiety, osteoporosis  who presents today for Results    Earnestine presents today for follow up    She has a history of palpitations, exacerbated by stress and associated with elevated blood pressure. Her palpitations have significantly improved since her last visit in late October/November.  She was evaluated by Cardiology and found stable.  Recommended to monitor heart rate and try a portable ECG monitor that can be used as needed.  Finds the holidays tend to be stressful and triggering symptoms.    She has osteoporosis of the hip, currently managed with Prolia. She declined oral osteoporosis medications due to concerns about potential stomach problems. She takes OTC vitamin D and calcium supplements.  Concerned of the Prolia may not be effective as on her last bone density test there was progression.    She reports improved sleep since reducing trazodone from three to two tablets and adding melatonin, eliminating morning grogginess. Due to neck pain, she uses only one pillow. While she experiences discomfort lying flat, she finds this position preferable for sleep.    She takes acid reflux medication as needed and has chronic cough associated with silent reflux.    She reports good symptom control with Flonase, noting better effectiveness compared to previous prescribed medications.    Her breakfast consists of 1-2 eggs, coffee with low-fat milk, and whole grain bread. She skips lunch due to poor daytime appetite. Dinner primarily consists of red meat with salads and sweet potatoes. She eats chicken only when other options are unavailable and avoids fish and rice. She reports difficulty with portion control of sweets when present in the house. For exercise, she currently only walks. Despite having  weights at home and a [employer] membership 2-3 blocks away, she does not utilize either. She owns a bicycle but expresses concern about falls and uncertainty about its effectiveness for exercise. She prefers outdoor activities.    Reports her rheumatoid arthritis has been stable.  Has a occasional joint swelling.  Continues on same treatment as her Enbrel was approved.         Review of Systems   Constitutional:  Negative for activity change and unexpected weight change.   HENT:  Positive for rhinorrhea. Negative for hearing loss and trouble swallowing.    Eyes:  Positive for visual disturbance. Negative for discharge.   Respiratory:  Negative for chest tightness and wheezing.    Cardiovascular:  Negative for chest pain and palpitations.   Gastrointestinal:  Negative for blood in stool, constipation, diarrhea and vomiting.   Endocrine: Positive for polyuria. Negative for polydipsia.   Genitourinary:  Negative for difficulty urinating, dysuria, hematuria and menstrual problem.   Musculoskeletal:  Negative for arthralgias, joint swelling and neck pain.   Neurological:  Negative for weakness and headaches.   Psychiatric/Behavioral:  Negative for confusion and dysphoric mood.       Past Medical History:   Diagnosis Date    Allergy     Arthritis     RH    Cataract     Glaucoma     Hyperlipidemia     Hypertension     Osteoarthritis     Osteoporosis     Palpitations     Rheumatoid arthritis     Seasonal allergies        Past Surgical History:   Procedure Laterality Date    ADENOIDECTOMY      TONSILLECTOMY         Family History   Problem Relation Name Age of Onset    Diabetes Mother Earnestine Velasquez     Cancer Father Chris Velasquez         Stomach or pancreas    Heart disease Father Chris Velasquez     Hypertension Father Chris Velasquez     Hyperlipidemia Sister 1     Hypertension Sister 1     Heart disease Sister 1     Rheum arthritis Sister 1     Cancer Brother Mey Limon     Diabetes Maternal Grandmother Tiffanie Castillo     Heart  disease Maternal Grandfather      Allergies Daughter      Asthma Neg Hx         Social History     Socioeconomic History    Marital status:    Tobacco Use    Smoking status: Never     Passive exposure: Never    Smokeless tobacco: Never   Substance and Sexual Activity    Alcohol use: Yes     Alcohol/week: 7.0 standard drinks of alcohol     Types: 7 Glasses of wine per week     Comment: A glass of wine daily    Drug use: Never    Sexual activity: Not Currently     Partners: Male     Birth control/protection: None     Social Drivers of Health     Financial Resource Strain: Medium Risk (2/25/2024)    Overall Financial Resource Strain (CARDIA)     Difficulty of Paying Living Expenses: Somewhat hard   Food Insecurity: Food Insecurity Present (2/25/2024)    Hunger Vital Sign     Worried About Running Out of Food in the Last Year: Sometimes true     Ran Out of Food in the Last Year: Sometimes true   Transportation Needs: No Transportation Needs (2/25/2024)    PRAPARE - Transportation     Lack of Transportation (Medical): No     Lack of Transportation (Non-Medical): No   Physical Activity: Inactive (2/25/2024)    Exercise Vital Sign     Days of Exercise per Week: 0 days     Minutes of Exercise per Session: 0 min   Stress: No Stress Concern Present (2/25/2024)    [employer] - Occupational Stress Questionnaire     Feeling of Stress : Not at all   Housing Stability: Low Risk  (2/25/2024)    Housing Stability Vital Sign     Unable to Pay for Housing in the Last Year: No     Number of Places Lived in the Last Year: 1     Unstable Housing in the Last Year: No       Current Outpatient Medications   Medication Sig Dispense Refill    azelastine (ASTELIN) 137 mcg (0.1 %) nasal spray 2 sprays (274 mcg total) by Nasal route 2 (two) times daily. 30 mL 3    calcium carbonate (OS-ALIYAH) 600 mg calcium (1,500 mg) Tab Take 1,200 mg by mouth once daily at 6am.      cetirizine (ZYRTEC) 10 MG tablet Take 10 mg  by mouth once daily.      cholecalciferol, vitamin D3, (VITAMIN D3) 50 mcg (2,000 unit) Cap Take 1 tablet by mouth once daily.      dorzolamide-timolol 2-0.5% (COSOPT) 22.3-6.8 mg/mL ophthalmic solution Place 1 drop into both eyes 2 (two) times daily.      etanercept (ENBREL SURECLICK) 50 mg/mL (1 mL) Inject 1 mL (50 mg total) into the skin once a week. 4 mL 11    ferrous fumarate/vit Bcomp,C (SUPER B COMPLEX ORAL) Take 1 tablet by mouth.      fluticasone propionate (FLONASE) 50 mcg/actuation nasal spray 2 sprays (100 mcg total) by Each Nostril route once daily. 16 g 12    folic acid (FOLVITE) 1 MG tablet TAKE 1 TABLET (1 MG TOTAL) BY MOUTH ONCE DAILY. 90 tablet 1    glucosamine HCl 1,500 mg Tab Take 1 tablet by mouth.      Lactobac no.41/Bifidobact no.7 (PROBIOTIC-10 ORAL) Take 1 capsule by mouth.      latanoprost 0.005 % ophthalmic solution PLACE ONE DROP IN EACH EYE AT BEDTIME      lisinopriL (PRINIVIL,ZESTRIL) 5 MG tablet TAKE 1 TABLET (5 MG TOTAL) BY MOUTH ONCE DAILY. 90 tablet 3    magnesium glycinate 100 mg Tab Take 1 tablet by mouth nightly. 30 tablet 11    melatonin 5 mg Cap Take 1 capsule by mouth every evening.      meloxicam (MOBIC) 7.5 MG tablet Take 1 tablet (7.5 mg total) by mouth once daily. May increase to 2 tablets if needed for pain. 30 tablet 0    methotrexate 2.5 MG Tab Take 5 tablets (12.5 mg total) by mouth every 7 days. 60 tablet 3    metoprolol tartrate (LOPRESSOR) 25 MG tablet TAKE 1 TABLET (25 MG TOTAL) BY MOUTH NIGHTLY. 90 tablet 3    traZODone (DESYREL) 50 MG tablet Take 2 tablets (100 mg total) by mouth every evening. 180 tablet 2    atorvastatin (LIPITOR) 40 MG tablet Take 1 tablet (40 mg total) by mouth every evening. 90 tablet 3    denosumab (PROLIA) 60 mg/mL Syrg Inject 1 mL (60 mg total) into the skin every 6 (six) months. (Patient not taking: Reported on 2/14/2025) 1 mL 6     No current facility-administered medications for this visit.       Review of patient's allergies  "indicates:   Allergen Reactions    Cat dander Other (See Comments)     Eyes itching and sneezing     Dog dander Other (See Comments)     Eyes starting itching and sneezing          Objective:       Last 3 sets of Vitals        12/9/2024    10:23 AM 2/6/2025    10:20 AM 2/14/2025     8:31 AM   Vitals - 1 value per visit   SYSTOLIC 121 128 116   DIASTOLIC 78 88 88   Pulse 57 64 59   SPO2   100 %   Weight (lb) 147.71 150.57 148.26   Weight (kg) 67 68.3 67.25   Height   5' 4" (1.626 m)   BMI (Calculated)   25.4   Pain Score One Zero Zero   Physical Exam  Constitutional:       General: She is not in acute distress.  HENT:      Head: Normocephalic.      Right Ear: Tympanic membrane, ear canal and external ear normal.      Left Ear: Tympanic membrane, ear canal and external ear normal.      Nose: Nose normal.      Mouth/Throat:      Mouth: Mucous membranes are moist.   Eyes:      General: No scleral icterus.     Extraocular Movements: Extraocular movements intact.      Conjunctiva/sclera: Conjunctivae normal.   Neck:      Vascular: No carotid bruit.      Comments: No goiter.  Cardiovascular:      Rate and Rhythm: Normal rate and regular rhythm.      Pulses: Normal pulses.      Heart sounds: Normal heart sounds.   Pulmonary:      Effort: Pulmonary effort is normal.      Breath sounds: Normal breath sounds.   Abdominal:      General: Bowel sounds are normal. There is no distension.      Palpations: Abdomen is soft. There is no mass.      Tenderness: There is no abdominal tenderness.   Musculoskeletal:         General: No swelling.      Comments: Discomfort on right ankle with palpable tendon but no significant swelling, no redness, or decreased range of motion.   Lymphadenopathy:      Cervical: No cervical adenopathy.   Skin:     General: Skin is warm and dry.   Neurological:      General: No focal deficit present.      Mental Status: She is alert and oriented to person, place, and time.   Psychiatric:         Mood and " Affect: Mood normal.         Behavior: Behavior normal.           CBC:  Recent Labs   Lab 08/16/24  1111 12/09/24  1107 02/04/25  1105   WBC 5.65 7.52 10.43   RBC 3.79 L 4.39 3.97 L   Hemoglobin 11.4 L 13.5 12.5   Hematocrit 36.9 L 40.9 38.2   Platelets 238 284 247   MCV 97 93 96   MCH 30.1 30.8 31.5 H   MCHC 30.9 L 33.0 32.7     CMP:  Recent Labs   Lab 08/16/24  1111 12/09/24  1107 02/04/25  1105   Glucose 105 104 104   Calcium 9.3 10.1 10.1   Albumin 4.0 4.5 4.3   Total Protein 7.1 8.1 8.0   Sodium 130 L 139 139   Potassium 4.5 4.2 4.4   CO2 20 L 24 21 L   Chloride 101 108 107   BUN 15 13 15   Creatinine 0.7 0.7 0.8   Alkaline Phosphatase 57 65 66   ALT 23 33 29   AST 25 27 26   Total Bilirubin 0.3 0.5 0.5     URINALYSIS:  Recent Labs   Lab 01/24/24  1040   Color, UA Colorless A   Specific Gravity, UA <1.005 A   pH, UA 6.0   Protein, UA Negative   Nitrite, UA Negative   Leukocytes, UA Negative   Urobilinogen, UA Negative      LIPIDS:  Recent Labs   Lab 07/06/22  0917 03/10/23  0825 08/16/24  1111 02/04/25  1105   TSH 3.01 3.056  --  1.893   HDL 55 69 48  --    Cholesterol 152 173 153  --    Triglycerides 122 79 111  --    LDL Cholesterol 77 88.2 82.8  --    HDL/Cholesterol Ratio 2.8 39.9 31.4  --    Non-HDL Cholesterol  --  104 105  --    Non HDL Chol. (LDL+VLDL) 97  --   --   --    Total Cholesterol/HDL Ratio  --  2.5 3.2  --      TSH:  Recent Labs   Lab 07/06/22  0917 03/10/23  0825 02/04/25  1105   TSH 3.01 3.056 1.893       A1C:  Recent Labs   Lab 07/06/22  0917 03/10/23  0825 08/16/24  1111   Hemoglobin A1C 5.4 5.3 5.5       Imaging:  XR Arthritis Survey  Narrative: EXAMINATION:  XR ARTHRITIS SURVEY    CLINICAL HISTORY:  r/o erosions;  Rheumatoid arthritis, unspecified    TECHNIQUE:  A lateral flexion view of the cervical spine was performed.  AP standing views of both knees were performed.  AP standing views of both feet and PA views of both hands were  performed.    COMPARISON:  07/05/2022    FINDINGS:  Anterior C1-C2 chronic non inflammatory arthritis changes.  Degenerative disc spondylosis C5 and C6, primary anterior subluxation C3 on C4, C4 on C5.  Tiny small posterior spur formation C5-C6.    Minimal mild tricompartment DJD changes in knee joints particularly medial compartment, 3 mm size joint free body left intercondylar.    Atherosclerotic plaquing, dystrophic calcification lateral subcutaneous right 5th 3rd finger.  Minimal osteo arthritis changes 1st metacarpal-carpal MCP and IP joints.    Mild moderate DJD 1st right MTP joint, less prominent changes contralateral side.  Impression: No fracture, dislocation or inflammatory, rheumatoid arthritis changes.    Electronically signed by: Sandeep Romo MD  Date:    02/26/2024  Time:    13:58      Assessment:       1. Hypertension, unspecified type    2. Pure hypercholesterolemia    3. Rheumatoid arthritis involving multiple sites with positive rheumatoid factor    4. Osteoporosis without pathological fracture    5. Gastroesophageal reflux disease, unspecified whether esophagitis present    6. Primary insomnia    7. Encounter for screening mammogram for malignant neoplasm of breast    8. Hyperglycemia    9. Healthcare maintenance            Plan:       1. Hypertension, unspecified type  Overview:  On lisinopril 5 mg day and metoprolol 25 mg nightly.    Assessment & Plan:  - Monitored the patient's blood pressure, noting that the diastolic pressure is slightly elevated today.  - Evaluated that the patient's home blood pressure readings from digital medicine and are within normal range.  - Continued the current antihypertensive medication regimen.      2. Pure hypercholesterolemia  Overview:  On atorvastatin 20 mg nightly    Assessment & Plan:  - Reviewed the patient's cholesterol levels, noting that LDL is 82, which is higher than the recommended level of less than 70.  - Evaluated that the patient's  triglycerides are very good, indicating a healthy diet with fiber and without processed foods or excessive sweets.  - Assessed that the patient's high red meat consumption may be contributing to elevated cholesterol levels.  - Educated on the benefits of exercise for improving HDL cholesterol levels.  - Increased atorvastatin dosage from 20 mg to 40 mg daily.  - Ordered a cholesterol panel for 3 months from now.      Orders:  -     Discontinue: atorvastatin (LIPITOR) 20 MG tablet; Take 2 tablets (40 mg total) by mouth every evening.  Dispense: 90 tablet; Refill: 3  -     Lipid Panel; Future; Expected date: 02/14/2025  -     atorvastatin (LIPITOR) 40 MG tablet; Take 1 tablet (40 mg total) by mouth every evening.  Dispense: 90 tablet; Refill: 3    3. Rheumatoid arthritis involving multiple sites with positive rheumatoid factor  Assessment & Plan:  - No active synovitis.    - continues Enbrel and methotrexate    Orders:  -     DXA Bone Density Axial Skeleton 1 or more sites; Future; Expected date: 02/14/2025    4. Osteoporosis without pathological fracture  Overview:  On Prolia and supplements.    Assessment & Plan:  - Reviewed bone density study results from 2023, which confirmed osteoporosis of the hip.  - Evaluated that the patient's condition has not improved to osteopenia.  - Explained the mechanism of action for Prolia and anabolic bone-forming medications.  - Discussed the importance of weight-bearing exercises for bone health.  - Advised on the potential long-term effects of proton pump inhibitors on bone health and magnesium absorption.  - Continued Prolia injections for osteoporosis treatment.  - Recommend continuing OTC vitamin D and calcium supplements.  - Ordered a repeat bone density study.  - Planned to consult if the bone density study shows worsening.  - Discussed the possibility of switching to anabolic medications if Prolia proves ineffective.    Orders:  -     DXA Bone Density Axial Skeleton 1 or  more sites; Future; Expected date: 02/14/2025    5. Gastroesophageal reflux disease, unspecified whether esophagitis present  Assessment & Plan:  - Noted that the patient reports having a constant cough, which was attributed to silent acid reflux by an ENT specialist.  - Explained the difference between omeprazole and famotidine for acid reflux management.  - Discussed the potential side effects of long-term use of proton pump inhibitors on bone health and magnesium absorption.  - Prescribed famotidine 20 mg twice daily or 40 mg daily, to be taken on an empty stomach for better effect.  - Recommend lifestyle changes such as elevating the head while sleeping and avoiding eating close to bedtime.      6. Primary insomnia  Overview:  Taking trazodone 100 mg nightly most of the time but sometimes will take 50 mg more.  Also takes melatonin 5 mg nightly.  Antihistamines have not helped.    Assessment & Plan:  - Noted that the patient reports sleeping well after reducing trazodone dosage from 3 to 2 tablets and adding melatonin as needed.  - Evaluated that the patient reports improved sleep quality and feeling better upon waking after reducing trazodone dosage.  - Continued trazodone at the reduced dose of 2 tablets.  - Continued melatonin for sleep.      7. Encounter for screening mammogram for malignant neoplasm of breast  -     Mammo Digital Screening Bilat w/ Neno (XPD); Future; Expected date: 02/14/2025    8. Hyperglycemia  -     Hemoglobin A1C; Future; Expected date: 02/14/2025    9. Healthcare maintenance  Assessment & Plan:  - Yearly labs- 02/04/25  - Colon cancer screening- 02/08/2022, repeat in 10 years.  - mammogram- 02/26/2024  - ACP- documents in chart.  - DEXA- 02/23/2023, ordered  - Vaccination- due for COVID vaccine.         FOLLOW UP:  - Mammogram ordered.  - Contact the office if experiencing palpitations to review results from at-home ECG device.        Health Maintenance Due   Topic Date Due     Hepatitis C Screening  Never done    COVID-19 Vaccine (3 - Moderna risk series) 08/19/2022    Mammogram  02/26/2025        I spent a total of 40 minutes on the day of the visit.  This includes face to face time and non-face to face time preparing to see the patient (eg, review of tests), obtaining and/or reviewing separately obtained history, documenting clinical information in the electronic or other health record, independently interpreting results and communicating results to the patient/family/caregiver, or care coordinator.     RETURN TO CLINIC IN: 6 MONTHS    FOR NEXT VISIT: REVIEW LABS, MEDICATION MONITORING, and CARE GAPS       Vesta Peña MD  [facility]  Disclaimer:  This note has been generated using voice-recognition software. There may be grammatical or spelling errors that have been missed during proof-reading

## 2025-02-14 NOTE — ASSESSMENT & PLAN NOTE
- Monitored the patient's blood pressure, noting that the diastolic pressure is slightly elevated today.  - Evaluated that the patient's home blood pressure readings from digital medicine and are within normal range.  - Continued the current antihypertensive medication regimen.

## 2025-02-14 NOTE — ASSESSMENT & PLAN NOTE
- Yearly labs- 02/04/25  - Colon cancer screening- 02/08/2022, repeat in 10 years.  - mammogram- 02/26/2024  - ACP- documents in chart.  - DEXA- 02/23/2023, ordered  - Vaccination- due for COVID vaccine.

## 2025-02-14 NOTE — ASSESSMENT & PLAN NOTE
- Noted that the patient reports having a constant cough, which was attributed to silent acid reflux by an ENT specialist.  - Explained the difference between omeprazole and famotidine for acid reflux management.  - Discussed the potential side effects of long-term use of proton pump inhibitors on bone health and magnesium absorption.  - Prescribed famotidine 20 mg twice daily or 40 mg daily, to be taken on an empty stomach for better effect.  - Recommend lifestyle changes such as elevating the head while sleeping and avoiding eating close to bedtime.

## 2025-02-14 NOTE — ASSESSMENT & PLAN NOTE
- Noted that the patient reports sleeping well after reducing trazodone dosage from 3 to 2 tablets and adding melatonin as needed.  - Evaluated that the patient reports improved sleep quality and feeling better upon waking after reducing trazodone dosage.  - Continued trazodone at the reduced dose of 2 tablets.  - Continued melatonin for sleep.

## 2025-02-17 ENCOUNTER — HOSPITAL ENCOUNTER (OUTPATIENT)
Dept: RADIOLOGY | Facility: HOSPITAL | Age: 65
Discharge: HOME | End: 2025-02-17
Attending: INTERNAL MEDICINE
Payer: MEDICARE

## 2025-02-17 VITALS — HEIGHT: 64 IN | BODY MASS INDEX: 25.27 KG/M2 | WEIGHT: 148 LBS

## 2025-02-17 DIAGNOSIS — M05.79 RHEUMATOID ARTHRITIS INVOLVING MULTIPLE SITES WITH POSITIVE RHEUMATOID FACTOR: ICD-10-CM

## 2025-02-17 DIAGNOSIS — Z12.31 ENCOUNTER FOR SCREENING MAMMOGRAM FOR MALIGNANT NEOPLASM OF BREAST: ICD-10-CM

## 2025-02-17 DIAGNOSIS — M81.0 OSTEOPOROSIS WITHOUT PATHOLOGICAL FRACTURE: ICD-10-CM

## 2025-02-17 PROCEDURE — 77080 DXA BONE DENSITY AXIAL: CPT | Mod: 26,,, | Performed by: INTERNAL MEDICINE

## 2025-02-17 PROCEDURE — 77080 DXA BONE DENSITY AXIAL: CPT | Mod: TC

## 2025-02-17 PROCEDURE — 77067 SCR MAMMO BI INCL CAD: CPT | Mod: TC

## 2025-02-23 ENCOUNTER — PATIENT MESSAGE (OUTPATIENT)
Dept: PRIMARY CARE CLINIC | Facility: CLINIC | Age: 65
End: 2025-02-23
Payer: MEDICARE

## 2025-02-24 ENCOUNTER — TELEPHONE (OUTPATIENT)
Dept: PRIMARY CARE CLINIC | Facility: CLINIC | Age: 65
End: 2025-02-24
Payer: MEDICARE

## 2025-02-24 NOTE — TELEPHONE ENCOUNTER
----- Message from VICTORINA Montoya sent at 2/13/2025 11:03 AM CST -----  Regarding: RE: Prolia Appointment /   Just including the other nurses so everyone is on the same page.  I scheduled a nurse visit for 2/25 at 1000 for her to receive her injection.  Please send injection on 2/24.      Thank you!  Lindsay  ----- Message -----  From: Juan Pablo Savage PharmD  Sent: 2/13/2025  10:15 AM CST  To: Lindsay Pascual RN  Subject: RE: Prolia Appointment /                  Thank you! I just spoke with Mrs. Petit to let her know it will be delivered. She did let me know that she is not due for her next Prolia until 2/23, per her calculations. She stated she will discuss at her appointment tomorrow, but asked that it is not taken out of the fridge in advance as she is concerned about taking it too early.    Juan Pablo Savage PharmD  Clinical Pharmacist  [facility]  [phone]  ----- Message -----  From: Lindsay Pascual RN  Sent: 2/13/2025  10:02 AM CST  To: Juan Pablo Savage PharmD  Subject: RE: Prolia Appointment /                  We are here until 1630.    M  ----- Message -----  From: Juan Pablo Savage PharmD  Sent: 2/13/2025   9:55 AM CST  To: Lindsay Pascual RN  Subject: RE: Prolia Appointment /                  Kamron Montoya,    Blanca! We will deliver today. Please let me know if we still have the correct address as well as your business hours:    ATTN: [facility]  [address] [place], [place], [address]    Thanks,    Juan Pablo Savage PharmD  Clinical Pharmacist  [facility]  [phone]  ----- Message -----  From: Lindsay Pascual RN  Sent: 2/13/2025   9:40 AM CST  To: Princess Samuel RN; Vesta Peña MD; #  Subject: RE: Prolia Appointment /                  Yes, that is the plan as of now.  Please deliver it.    Thanks!  Lindsay Pascual RN, BSN  ----- Message -----  From: Juan Pablo Savage, PharmD  Sent: 2/13/2025   9:34 AM  CST  To: Princess Samuel RN; Vesta Peña MD; #  Subject: FW: Prolia Appointment /                  Good morning,    I am following up on the previous message. Is the plan for Mrs. Petit to receive her Prolia injection at her appointment tomorrow? Please let me know if so, so I can have it delivered to the office today.    Thank you,    Juan Pablo Savage PharmD  Clinical Pharmacist  [facility]  [phone]  ----- Message -----  From: Juan Pablo Savage PharmD  Sent: 2/10/2025  11:29 AM CST  To: [facility] Staff  Subject: Prolia Appointment /                      Good afternoon,    [facility] spoke to Ms. Petit today and she informed us that she was unable to scheduled her Prolia injection appointment as she was told the Prolia must be sent to the office first. In order for [facility] to  the Prolia to the office, the patient must have an appointment scheduled. Please let me know once she is scheduled and I will be able to set up the delivery.    Thank you,    Juan Pablo Savage, [place]  Clinical Pharmacist  [facility]  [phone]

## 2025-02-24 NOTE — TELEPHONE ENCOUNTER
This CM spoke to [facility] this am and Devika reported  should have medication (Prolia) to [facility] by afternoon.

## 2025-02-25 ENCOUNTER — CLINICAL SUPPORT (OUTPATIENT)
Dept: PRIMARY CARE CLINIC | Facility: CLINIC | Age: 65
End: 2025-02-25
Payer: MEDICARE

## 2025-02-25 ENCOUNTER — TELEPHONE (OUTPATIENT)
Dept: PRIMARY CARE CLINIC | Facility: CLINIC | Age: 65
End: 2025-02-25
Payer: MEDICARE

## 2025-02-25 VITALS
SYSTOLIC BLOOD PRESSURE: 110 MMHG | HEART RATE: 68 BPM | DIASTOLIC BLOOD PRESSURE: 74 MMHG | BODY MASS INDEX: 25.11 KG/M2 | OXYGEN SATURATION: 99 % | WEIGHT: 146.25 LBS

## 2025-02-25 DIAGNOSIS — M81.0 OSTEOPOROSIS WITHOUT PATHOLOGICAL FRACTURE: Primary | ICD-10-CM

## 2025-02-25 PROCEDURE — 99999 PR PBB SHADOW E&M-EST. PATIENT-LVL II: CPT | Mod: PBBFAC,HCNC,,

## 2025-02-25 NOTE — PROGRESS NOTES
Pt presents for Prolia shot, identified by name and date of birth. Administered denosumab Prolia 60 mg SubQ to posterior Left upper arm via aseptic technique, no blood noted, sterile bandaid applied, tolerated well. Pt instructed to remain in clinic for 15 min of monitoring, pt verbalized understanding.  Given information on Prolia.    Patient confirmed taking Calcium, Vit D, no dental procedures for >6mo except cleaning.     Patient had not had any fluids or food, given crackers, and fluids prior to injection. Denies dizziness or lightheadedness.     Advised of symptoms  and serious side effects:  Increased risk of severe low calcium levels, spasms, twitches, cramps in muscles, may salso cause numbness, or tingling in your fingers, toes or mouth    Common side effects:  Back pain  Joint pain  Common cold  Bladder infection  headache    Serious Allergic reactions:  Low blood pressure  Trouble breathing  Throat tightness  Swelling of face, lips or tongue  Rash   Itching  Hives    Serious infections:  Fever or chills  Skin that looks red or swollen  Severe abdominal pain  Urination pain or burning    May cause sever jaw bone problems, important to practice good mouth care

## 2025-03-10 ENCOUNTER — LAB VISIT (OUTPATIENT)
Dept: LAB | Facility: HOSPITAL | Age: 65
End: 2025-03-10
Attending: INTERNAL MEDICINE
Payer: MEDICARE

## 2025-03-10 DIAGNOSIS — M06.9 RHEUMATOID ARTHRITIS FLARE: ICD-10-CM

## 2025-03-10 LAB
ALBUMIN SERPL BCP-MCNC: 4.2 G/DL (ref 3.5–5.2)
ALP SERPL-CCNC: 61 U/L (ref 40–150)
ALT SERPL W/O P-5'-P-CCNC: 31 U/L (ref 10–44)
ANION GAP SERPL CALC-SCNC: 7 MMOL/L (ref 8–16)
AST SERPL-CCNC: 33 U/L (ref 10–40)
BILIRUB SERPL-MCNC: 0.4 MG/DL (ref 0.1–1)
BUN SERPL-MCNC: 15 MG/DL (ref 8–23)
CALCIUM SERPL-MCNC: 9.2 MG/DL (ref 8.7–10.5)
CHLORIDE SERPL-SCNC: 106 MMOL/L (ref 95–110)
CO2 SERPL-SCNC: 22 MMOL/L (ref 23–29)
CREAT SERPL-MCNC: 0.8 MG/DL (ref 0.5–1.4)
CRP SERPL-MCNC: <0.3 MG/L (ref 0–8.2)
ERYTHROCYTE [SEDIMENTATION RATE] IN BLOOD BY PHOTOMETRIC METHOD: 11 MM/HR (ref 0–36)
EST. GFR  (NO RACE VARIABLE): >60 ML/MIN/1.73 M^2
GLUCOSE SERPL-MCNC: 95 MG/DL (ref 70–110)
POTASSIUM SERPL-SCNC: 4.4 MMOL/L (ref 3.5–5.1)
PROT SERPL-MCNC: 7.3 G/DL (ref 6–8.4)
SODIUM SERPL-SCNC: 135 MMOL/L (ref 136–145)

## 2025-03-10 PROCEDURE — 85025 COMPLETE CBC W/AUTO DIFF WBC: CPT | Mod: HCNC | Performed by: INTERNAL MEDICINE

## 2025-03-10 PROCEDURE — 80053 COMPREHEN METABOLIC PANEL: CPT | Mod: HCNC | Performed by: INTERNAL MEDICINE

## 2025-03-10 PROCEDURE — 36415 COLL VENOUS BLD VENIPUNCTURE: CPT | Mod: HCNC,PO | Performed by: INTERNAL MEDICINE

## 2025-03-10 PROCEDURE — 85652 RBC SED RATE AUTOMATED: CPT | Mod: HCNC | Performed by: INTERNAL MEDICINE

## 2025-03-10 PROCEDURE — 86140 C-REACTIVE PROTEIN: CPT | Mod: HCNC | Performed by: INTERNAL MEDICINE

## 2025-03-11 ENCOUNTER — TELEPHONE (OUTPATIENT)
Dept: PRIMARY CARE CLINIC | Facility: CLINIC | Age: 65
End: 2025-03-11
Payer: MEDICARE

## 2025-03-11 LAB
BASOPHILS # BLD AUTO: 0.04 K/UL (ref 0–0.2)
BASOPHILS NFR BLD: 0.6 % (ref 0–1.9)
DIFFERENTIAL METHOD BLD: ABNORMAL
EOSINOPHIL # BLD AUTO: 0.3 K/UL (ref 0–0.5)
EOSINOPHIL NFR BLD: 3.5 % (ref 0–8)
ERYTHROCYTE [DISTWIDTH] IN BLOOD BY AUTOMATED COUNT: 14.7 % (ref 11.5–14.5)
HCT VFR BLD AUTO: 41.4 % (ref 37–48.5)
HGB BLD-MCNC: 12.6 G/DL (ref 12–16)
IMM GRANULOCYTES # BLD AUTO: 0.03 K/UL (ref 0–0.04)
IMM GRANULOCYTES NFR BLD AUTO: 0.4 % (ref 0–0.5)
LYMPHOCYTES # BLD AUTO: 2.4 K/UL (ref 1–4.8)
LYMPHOCYTES NFR BLD: 32.7 % (ref 18–48)
MCH RBC QN AUTO: 30.2 PG (ref 27–31)
MCHC RBC AUTO-ENTMCNC: 30.4 G/DL (ref 32–36)
MCV RBC AUTO: 99 FL (ref 82–98)
MONOCYTES # BLD AUTO: 0.6 K/UL (ref 0.3–1)
MONOCYTES NFR BLD: 8.4 % (ref 4–15)
NEUTROPHILS # BLD AUTO: 3.9 K/UL (ref 1.8–7.7)
NEUTROPHILS NFR BLD: 54.4 % (ref 38–73)
NRBC BLD-RTO: 0 /100 WBC
PLATELET # BLD AUTO: 251 K/UL (ref 150–450)
PMV BLD AUTO: 11.2 FL (ref 9.2–12.9)
RBC # BLD AUTO: 4.17 M/UL (ref 4–5.4)
WBC # BLD AUTO: 7.24 K/UL (ref 3.9–12.7)

## 2025-03-11 NOTE — TELEPHONE ENCOUNTER
----- Message from Suni sent at 3/11/2025  4:11 PM CDT -----  Type: General Call Back Name of Caller:Pt Symptoms:enhanced wellness visitWould the patient rather a call back or a response via [facility]? [employer] Call Back Number: [phone] Information: Pt would like a call back regarding enhanced wellness visit, please contact pt with further information.

## 2025-03-15 ENCOUNTER — RESULTS FOLLOW-UP (OUTPATIENT)
Dept: RHEUMATOLOGY | Facility: CLINIC | Age: 65
End: 2025-03-15

## 2025-03-17 DIAGNOSIS — M06.9 RHEUMATOID ARTHRITIS INVOLVING MULTIPLE JOINTS: ICD-10-CM

## 2025-03-17 RX ORDER — FOLIC ACID 1 MG/1
2 TABLET ORAL DAILY
Qty: 180 TABLET | Refills: 3 | Status: SHIPPED | OUTPATIENT
Start: 2025-03-17 | End: 2026-03-17

## 2025-03-19 ENCOUNTER — PATIENT MESSAGE (OUTPATIENT)
Dept: RHEUMATOLOGY | Facility: CLINIC | Age: 65
End: 2025-03-19
Payer: MEDICARE

## 2025-03-19 DIAGNOSIS — F51.01 PRIMARY INSOMNIA: ICD-10-CM

## 2025-03-19 RX ORDER — TRAZODONE HYDROCHLORIDE 50 MG/1
100 TABLET ORAL NIGHTLY
Qty: 180 TABLET | Refills: 2 | Status: SHIPPED | OUTPATIENT
Start: 2025-03-19

## 2025-07-01 ENCOUNTER — TELEPHONE (OUTPATIENT)
Dept: PRIMARY CARE CLINIC | Facility: CLINIC | Age: 65
End: 2025-07-01
Payer: MEDICARE

## 2025-07-01 DIAGNOSIS — I10 HYPERTENSION, UNSPECIFIED TYPE: ICD-10-CM

## 2025-07-01 DIAGNOSIS — R35.0 FREQUENCY OF URINATION: ICD-10-CM

## 2025-07-01 DIAGNOSIS — R73.9 HYPERGLYCEMIA: ICD-10-CM

## 2025-07-01 DIAGNOSIS — E78.00 PURE HYPERCHOLESTEROLEMIA: Primary | ICD-10-CM

## 2025-07-01 DIAGNOSIS — M05.79 RHEUMATOID ARTHRITIS INVOLVING MULTIPLE SITES WITH POSITIVE RHEUMATOID FACTOR: ICD-10-CM

## 2025-07-01 NOTE — TELEPHONE ENCOUNTER
----- Message from Naomi sent at 7/1/2025 12:55 PM CDT -----  Regarding: Requesting blood labs for physical  Pt requesting complete blood orders, A1c and Cholesterol, comprehensive metabolic panel, etc and urine please      Pt can be reached at [phone]    Thank you!

## 2025-07-09 ENCOUNTER — LAB VISIT (OUTPATIENT)
Dept: LAB | Facility: HOSPITAL | Age: 65
End: 2025-07-09
Attending: INTERNAL MEDICINE
Payer: MEDICARE

## 2025-07-09 DIAGNOSIS — E78.00 PURE HYPERCHOLESTEROLEMIA: ICD-10-CM

## 2025-07-09 DIAGNOSIS — R35.0 FREQUENCY OF URINATION: ICD-10-CM

## 2025-07-09 DIAGNOSIS — M05.79 RHEUMATOID ARTHRITIS INVOLVING MULTIPLE SITES WITH POSITIVE RHEUMATOID FACTOR: ICD-10-CM

## 2025-07-09 DIAGNOSIS — I10 HYPERTENSION, UNSPECIFIED TYPE: ICD-10-CM

## 2025-07-09 DIAGNOSIS — R73.9 HYPERGLYCEMIA: ICD-10-CM

## 2025-07-09 LAB
ABSOLUTE EOSINOPHIL (OHS): 0.24 K/UL
ABSOLUTE MONOCYTE (OHS): 0.81 K/UL (ref 0.3–1)
ABSOLUTE NEUTROPHIL COUNT (OHS): 3.56 K/UL (ref 1.8–7.7)
ALBUMIN SERPL BCP-MCNC: 4.2 G/DL (ref 3.5–5.2)
ALP SERPL-CCNC: 79 UNIT/L (ref 40–150)
ALT SERPL W/O P-5'-P-CCNC: 35 UNIT/L (ref 10–44)
ANION GAP (OHS): 5 MMOL/L (ref 8–16)
AST SERPL-CCNC: 26 UNIT/L (ref 11–45)
BACTERIA #/AREA URNS AUTO: NORMAL /HPF
BASOPHILS # BLD AUTO: 0.06 K/UL
BASOPHILS NFR BLD AUTO: 0.8 %
BILIRUB SERPL-MCNC: 0.3 MG/DL (ref 0.1–1)
BILIRUB UR QL STRIP.AUTO: NEGATIVE
BUN SERPL-MCNC: 16 MG/DL (ref 8–23)
CALCIUM SERPL-MCNC: 9.7 MG/DL (ref 8.7–10.5)
CHLORIDE SERPL-SCNC: 109 MMOL/L (ref 95–110)
CHOLEST SERPL-MCNC: 162 MG/DL (ref 120–199)
CHOLEST/HDLC SERPL: 2.8 {RATIO} (ref 2–5)
CLARITY UR: CLEAR
CO2 SERPL-SCNC: 25 MMOL/L (ref 23–29)
COLOR UR AUTO: YELLOW
CREAT SERPL-MCNC: 0.7 MG/DL (ref 0.5–1.4)
EAG (OHS): 108 MG/DL (ref 68–131)
ERYTHROCYTE [DISTWIDTH] IN BLOOD BY AUTOMATED COUNT: 14.2 % (ref 11.5–14.5)
GFR SERPLBLD CREATININE-BSD FMLA CKD-EPI: >60 ML/MIN/1.73/M2
GLUCOSE SERPL-MCNC: 100 MG/DL (ref 70–110)
GLUCOSE UR QL STRIP: NEGATIVE
HBA1C MFR BLD: 5.4 % (ref 4–5.6)
HCT VFR BLD AUTO: 37.7 % (ref 37–48.5)
HDLC SERPL-MCNC: 57 MG/DL (ref 40–75)
HDLC SERPL: 35.2 % (ref 20–50)
HGB BLD-MCNC: 11.8 GM/DL (ref 12–16)
HGB UR QL STRIP: NEGATIVE
IMM GRANULOCYTES # BLD AUTO: 0.03 K/UL (ref 0–0.04)
IMM GRANULOCYTES NFR BLD AUTO: 0.4 % (ref 0–0.5)
KETONES UR QL STRIP: NEGATIVE
LDLC SERPL CALC-MCNC: 70.6 MG/DL (ref 63–159)
LEUKOCYTE ESTERASE UR QL STRIP: ABNORMAL
LYMPHOCYTES # BLD AUTO: 2.52 K/UL (ref 1–4.8)
MCH RBC QN AUTO: 30.4 PG (ref 27–31)
MCHC RBC AUTO-ENTMCNC: 31.3 G/DL (ref 32–36)
MCV RBC AUTO: 97 FL (ref 82–98)
MICROSCOPIC COMMENT: NORMAL
NITRITE UR QL STRIP: NEGATIVE
NONHDLC SERPL-MCNC: 105 MG/DL
NUCLEATED RBC (/100WBC) (OHS): 0 /100 WBC
PH UR STRIP: 7 [PH]
PLATELET # BLD AUTO: 222 K/UL (ref 150–450)
PMV BLD AUTO: 11.2 FL (ref 9.2–12.9)
POTASSIUM SERPL-SCNC: 4.7 MMOL/L (ref 3.5–5.1)
PROT SERPL-MCNC: 7.2 GM/DL (ref 6–8.4)
PROT UR QL STRIP: NEGATIVE
RBC # BLD AUTO: 3.88 M/UL (ref 4–5.4)
RBC #/AREA URNS AUTO: <1 /HPF (ref 0–4)
RELATIVE EOSINOPHIL (OHS): 3.3 %
RELATIVE LYMPHOCYTE (OHS): 34.9 % (ref 18–48)
RELATIVE MONOCYTE (OHS): 11.2 % (ref 4–15)
RELATIVE NEUTROPHIL (OHS): 49.4 % (ref 38–73)
SODIUM SERPL-SCNC: 139 MMOL/L (ref 136–145)
SP GR UR STRIP: 1.01
TRIGL SERPL-MCNC: 172 MG/DL (ref 30–150)
UROBILINOGEN UR STRIP-ACNC: NEGATIVE EU/DL
WBC # BLD AUTO: 7.22 K/UL (ref 3.9–12.7)
WBC #/AREA URNS AUTO: 1 /HPF (ref 0–5)

## 2025-07-09 PROCEDURE — 36415 COLL VENOUS BLD VENIPUNCTURE: CPT | Mod: HCNC,PO

## 2025-07-09 PROCEDURE — 85025 COMPLETE CBC W/AUTO DIFF WBC: CPT | Mod: HCNC

## 2025-07-09 PROCEDURE — 82040 ASSAY OF SERUM ALBUMIN: CPT | Mod: HCNC

## 2025-07-09 PROCEDURE — 83036 HEMOGLOBIN GLYCOSYLATED A1C: CPT | Mod: HCNC

## 2025-07-09 PROCEDURE — 80061 LIPID PANEL: CPT | Mod: HCNC

## 2025-07-09 PROCEDURE — 81001 URINALYSIS AUTO W/SCOPE: CPT | Mod: HCNC

## 2025-07-10 ENCOUNTER — OFFICE VISIT (OUTPATIENT)
Dept: RHEUMATOLOGY | Facility: CLINIC | Age: 65
End: 2025-07-10
Payer: MEDICARE

## 2025-07-10 VITALS
WEIGHT: 151.44 LBS | SYSTOLIC BLOOD PRESSURE: 124 MMHG | BODY MASS INDEX: 25.85 KG/M2 | HEART RATE: 69 BPM | HEIGHT: 64 IN | DIASTOLIC BLOOD PRESSURE: 79 MMHG

## 2025-07-10 DIAGNOSIS — M06.9 RHEUMATOID ARTHRITIS FLARE: Primary | ICD-10-CM

## 2025-07-10 DIAGNOSIS — D84.821 IMMUNODEFICIENCY DUE TO DRUG THERAPY: ICD-10-CM

## 2025-07-10 DIAGNOSIS — Z79.899 OTHER LONG TERM (CURRENT) DRUG THERAPY: ICD-10-CM

## 2025-07-10 DIAGNOSIS — Z79.899 IMMUNODEFICIENCY DUE TO DRUG THERAPY: ICD-10-CM

## 2025-07-10 LAB — HOLD SPECIMEN: NORMAL

## 2025-07-10 PROCEDURE — 1159F MED LIST DOCD IN RCRD: CPT | Mod: CPTII,HCNC,S$GLB, | Performed by: INTERNAL MEDICINE

## 2025-07-10 PROCEDURE — 1101F PT FALLS ASSESS-DOCD LE1/YR: CPT | Mod: CPTII,HCNC,S$GLB, | Performed by: INTERNAL MEDICINE

## 2025-07-10 PROCEDURE — 3008F BODY MASS INDEX DOCD: CPT | Mod: CPTII,HCNC,S$GLB, | Performed by: INTERNAL MEDICINE

## 2025-07-10 PROCEDURE — 3074F SYST BP LT 130 MM HG: CPT | Mod: CPTII,HCNC,S$GLB, | Performed by: INTERNAL MEDICINE

## 2025-07-10 PROCEDURE — 1157F ADVNC CARE PLAN IN RCRD: CPT | Mod: CPTII,HCNC,S$GLB, | Performed by: INTERNAL MEDICINE

## 2025-07-10 PROCEDURE — 99999 PR PBB SHADOW E&M-EST. PATIENT-LVL IV: CPT | Mod: PBBFAC,HCNC,, | Performed by: INTERNAL MEDICINE

## 2025-07-10 PROCEDURE — 4010F ACE/ARB THERAPY RXD/TAKEN: CPT | Mod: CPTII,HCNC,S$GLB, | Performed by: INTERNAL MEDICINE

## 2025-07-10 PROCEDURE — 3044F HG A1C LEVEL LT 7.0%: CPT | Mod: CPTII,HCNC,S$GLB, | Performed by: INTERNAL MEDICINE

## 2025-07-10 PROCEDURE — 3078F DIAST BP <80 MM HG: CPT | Mod: CPTII,HCNC,S$GLB, | Performed by: INTERNAL MEDICINE

## 2025-07-10 PROCEDURE — 1126F AMNT PAIN NOTED NONE PRSNT: CPT | Mod: CPTII,HCNC,S$GLB, | Performed by: INTERNAL MEDICINE

## 2025-07-10 PROCEDURE — 99214 OFFICE O/P EST MOD 30 MIN: CPT | Mod: HCNC,S$GLB,, | Performed by: INTERNAL MEDICINE

## 2025-07-10 PROCEDURE — 3288F FALL RISK ASSESSMENT DOCD: CPT | Mod: CPTII,HCNC,S$GLB, | Performed by: INTERNAL MEDICINE

## 2025-07-10 NOTE — PROGRESS NOTES
7/7/2025     9:33 AM   Rapid3 Question Responses and Scores   MDHAQ Score 0   Psychologic Score 0   Pain Score 0   When you awakened in the morning OVER THE LAST WEEK, did you feel stiff? No   Fatigue Score 0   Global Health Score 0   RAPID3 Score 0    Answers submitted by the patient for this visit:  Rheumatology Questionnaire (Submitted on 7/7/2025)  fever: No  eye redness: No  mouth sores: No  headaches: No  shortness of breath: No  chest pain: No  trouble swallowing: No  diarrhea: No  constipation: No  unexpected weight change: No  genital sore: No  dysuria: No  During the last 3 days, have you had a skin rash?: No  Bruises or bleeds easily: No  cough: No

## 2025-07-10 NOTE — PROGRESS NOTES
Subjective:       Patient ID: Earnestine Petit is a 61 y.o. female.    Chief Complaint: Disease Management    HPI 61 year old F with PMH of RA (around age 57), osteoporosis, palpitations here for evaluation.  When she was fist diagnosed with RA, she had involvement of shoulders, elbows, hands,wrists, knees, ankle, and feet.  She was started on MTX and prednisone at time of diagnosis.  She is taking MTX 3 pills once a week for a year and also on enbrel for over 3 years. She is taking folic acid 1 mg a day.  She has mild pain in right knee, right wrist, and some pain in hands with making a fist. She also has pain in feet. She reports she gets swelling in left second finger and other fingers on left hand. On occasion, her right knee will get swollen.  Resting improves the pain. Denies any rashes, oral ulcers, hair loss, fevers,or photosensitivity. She smoked when she was 14 just socially.      She is on prolia for a year and half for osteoporosis. She was on avista for 3 years.  She has had osteoporosis since age 52.    Family hx: sister: RA  Aunt: RA    Interval history: She is taking MTX 5  pills once a week. She is taking folic acid  1 mg a day.  She is on Enbrel.   She has pain in right knee. She reports worsening pain and swelling in hands.    Past Medical History:   Diagnosis Date    Arthritis     RH    Hyperlipidemia     Hypertension     Osteoporosis        Review of Systems   Constitutional: Negative for activity change, appetite change, chills, diaphoresis, fatigue, fever and unexpected weight change.   HENT: Negative for congestion, ear discharge, ear pain, facial swelling, mouth sores, sinus pressure, sneezing, sore throat, tinnitus and trouble swallowing.    Eyes: Negative for photophobia, pain, discharge, redness, itching and visual disturbance.   Respiratory: Negative for apnea, cough, chest tightness, shortness of breath, wheezing and stridor.    Cardiovascular: Negative for chest pain and leg swelling.    Gastrointestinal: Negative for abdominal distention, abdominal pain, anal bleeding, blood in stool, constipation, diarrhea and nausea.   Endocrine: Negative for cold intolerance and heat intolerance.   Genitourinary: Negative for difficulty urinating, dysuria and genital sores.   Musculoskeletal: Positive for arthralgias. Negative for back pain, gait problem, joint swelling, myalgias, neck pain and neck stiffness.   Skin: Negative for color change, pallor, rash and wound.   Neurological: Negative for dizziness, seizures, light-headedness, numbness and headaches.   Hematological: Negative for adenopathy. Does not bruise/bleed easily.   Psychiatric/Behavioral: Negative for sleep disturbance. The patient is not nervous/anxious.            Objective:        Physical Exam   Constitutional: She is oriented to person, place, and time.   HENT:   Head: Normocephalic and atraumatic.   Right Ear: External ear normal.   Left Ear: External ear normal.   Nose: Nose normal.   Mouth/Throat: Oropharynx is clear and moist. No oropharyngeal exudate.   Eyes: Conjunctivae and EOM are normal. Pupils are equal, round, and reactive to light. Right eye exhibits no discharge. Left eye exhibits no discharge. No scleral icterus.   Neck: No JVD present. No thyromegaly present.   Cardiovascular: Normal rate, regular rhythm, normal heart sounds and intact distal pulses.  Exam reveals no gallop and no friction rub.    No murmur heard.  Pulmonary/Chest: Effort normal and breath sounds normal. No respiratory distress. She has no wheezes. She has no rales. She exhibits no tenderness.   Abdominal: Soft. Bowel sounds are normal. She exhibits no distension and no mass. There is no abdominal tenderness. There is no rebound and no guarding.   Lymphadenopathy:     She has no cervical adenopathy.   Neurological: She is alert and oriented to person, place, and time. No cranial nerve deficit. Gait normal. Coordination normal.   Skin: Skin is dry. No rash  noted. No erythema. No pallor.     Psychiatric: Affect and judgment normal.   Musculoskeletal: Deformity present. No tenderness or edema.           Assessment:     64   year old F with PMH of RA (around age 57), osteoporosis, palpitations here for follow up of seropositive RA.    # Seropositive RA: She was on MTX  5 pills once a week and Enbrel and was flaring so plan was to switch to Humira but patient decided to stay on Enbrel.  Discussed with patient that I detect some synovitis on exam, but she would like to wait before making any changes.    -continue MTX 5 pills once a week  -continue folic acid 1 mg po qday  -continue Enbrel 50mg sub q once a week   Labs every 3 months        Immunodeficiency due to drug-   -monitor carefully for infection and any toxicities associated with immunosuppressants  -advised age appropriate cancer screenings including yearly skin exam and age appropriate vaccinations  -advised to seek immediate care in the setting of infection and hold immunosuppressive medications if there is infection    30 minutes of total time spent on the encounter, which includes face to face time and non-face to face time preparing to see the patient (eg, review of tests), Obtaining and/or reviewing separately obtained history, Documenting clinical information in the electronic or other health record, Independently interpreting results (not separately reported) and communicating results to the patient/family/caregiver, or Care coordination (not separately reported).

## 2025-07-11 ENCOUNTER — PATIENT MESSAGE (OUTPATIENT)
Dept: PRIMARY CARE CLINIC | Facility: CLINIC | Age: 65
End: 2025-07-11
Payer: MEDICARE

## 2025-07-17 ENCOUNTER — RESULTS FOLLOW-UP (OUTPATIENT)
Dept: INTERNAL MEDICINE | Facility: CLINIC | Age: 65
End: 2025-07-17

## 2025-07-17 ENCOUNTER — OFFICE VISIT (OUTPATIENT)
Dept: INTERNAL MEDICINE | Facility: CLINIC | Age: 65
End: 2025-07-17
Payer: MEDICARE

## 2025-07-17 ENCOUNTER — LAB VISIT (OUTPATIENT)
Dept: LAB | Facility: HOSPITAL | Age: 65
End: 2025-07-17
Payer: MEDICARE

## 2025-07-17 VITALS
WEIGHT: 149.06 LBS | HEIGHT: 64 IN | HEART RATE: 60 BPM | SYSTOLIC BLOOD PRESSURE: 110 MMHG | DIASTOLIC BLOOD PRESSURE: 80 MMHG | OXYGEN SATURATION: 99 % | BODY MASS INDEX: 25.45 KG/M2

## 2025-07-17 DIAGNOSIS — Z00.00 ENCOUNTER FOR MEDICARE ANNUAL WELLNESS EXAM: Primary | ICD-10-CM

## 2025-07-17 DIAGNOSIS — K21.9 GASTROESOPHAGEAL REFLUX DISEASE, UNSPECIFIED WHETHER ESOPHAGITIS PRESENT: ICD-10-CM

## 2025-07-17 DIAGNOSIS — E78.49 OTHER HYPERLIPIDEMIA: ICD-10-CM

## 2025-07-17 DIAGNOSIS — M06.9 RHEUMATOID ARTHRITIS, INVOLVING UNSPECIFIED SITE, UNSPECIFIED WHETHER RHEUMATOID FACTOR PRESENT: ICD-10-CM

## 2025-07-17 DIAGNOSIS — E78.00 PURE HYPERCHOLESTEROLEMIA: ICD-10-CM

## 2025-07-17 DIAGNOSIS — Z11.59 ENCOUNTER FOR HEPATITIS C SCREENING TEST FOR LOW RISK PATIENT: ICD-10-CM

## 2025-07-17 DIAGNOSIS — F41.9 ANXIETY: ICD-10-CM

## 2025-07-17 DIAGNOSIS — I10 PRIMARY HYPERTENSION: ICD-10-CM

## 2025-07-17 LAB — HCV AB SERPL QL IA: NORMAL

## 2025-07-17 PROCEDURE — 99999 PR PBB SHADOW E&M-EST. PATIENT-LVL V: CPT | Mod: PBBFAC,HCNC,, | Performed by: NURSE PRACTITIONER

## 2025-07-17 PROCEDURE — 86803 HEPATITIS C AB TEST: CPT | Mod: HCNC

## 2025-07-17 PROCEDURE — 36415 COLL VENOUS BLD VENIPUNCTURE: CPT | Mod: HCNC

## 2025-07-17 NOTE — PROGRESS NOTES
"  Earnestine Petit presented for initial Medicare AWV today. The following components were reviewed and updated:    Medical history  Family History  Social history  Allergies and Current Medications  Health Risk Assessment  Health Maintenance  Care Team    **See Completed Assessments for Annual Wellness visit with in the encounter summary    The following assessments were completed:  Depression Screening  Cognitive function Screening  Timed Get Up Test  Whisper Test      Opioid documentation:      Patient does not have a current opioid prescription.          Vitals:    07/17/25 0831   BP: 110/80   BP Location: Right arm   Patient Position: Sitting   Pulse: 60   SpO2: 99%   Weight: 67.6 kg (149 lb 0.5 oz)   Height: 5' 4" (1.626 m)     Body mass index is 25.58 kg/m².       Physical Exam      Diagnoses and health risks identified today and associated recommendations/orders:  1. Encounter for Medicare annual wellness exam  Here for Health Risk Assessment/Annual Wellness Visit. Health maintenance reviewed and updated. Follow up in one year.    - Referral to Enhanced Annual Wellness Visit (eAWV) W+1    2. Anxiety  Problem is stable. Will continue current management. Follow up with PCP      3. Primary hypertension  Problem is stable. Will continue current management. Follow up with PCP      4. Pure hypercholesterolemia  Problem is stable. Will continue current management. Follow up with PCP      5. Other hyperlipidemia  Problem is stable. Will continue current management. Follow up with PCP      6. Rheumatoid arthritis, involving unspecified site, unspecified whether rheumatoid factor present  Problem is stable, on etanercept. Will continue current management. Follow up with PCP      7. Gastroesophageal reflux disease, unspecified whether esophagitis present  Problem is stable. Will continue current management. Follow up with PCP      8. Encounter for hepatitis C screening test for low risk patient  - Due for routine screening " Called pt and LM for return call.    per USPSTF guidelines. Ordered   - HEPATITIS C ANTIBODY; Future      Provided Earnestine with a 5-10 year written screening schedule and personal prevention plan. Recommendations were developed using the USPSTF age appropriate recommendations. Education, counseling, and referrals were provided as needed.  After Visit Summary printed and given to patient which includes a list of additional screenings\tests needed.        Matt Rodriguez, NP      I offered to discuss advanced care planning, including how to pick a person who would make decisions for you if you were unable to make them for yourself, called a health care power of , and what kind of decisions you might make such as use of life sustaining treatments such as ventilators and tube feeding when faced with a life limiting illness recorded on a living will that they will need to know. (How you want to be cared for as you near the end of your natural life)     X Patient is interested in learning more about how to make advanced directives.  I provided them paperwork and offered to discuss this with them.

## 2025-07-17 NOTE — PATIENT INSTRUCTIONS
Counseling and Referral of Other Preventative  (Italic type indicates deductible and co-insurance are waived)    Patient Name: Earnestine Petit  Today's Date: 7/17/2025    Health Maintenance       Date Due Completion Date    Hepatitis C Screening Never done ---    COVID-19 Vaccine (3 - Moderna risk series) 08/19/2022 7/22/2022    Influenza Vaccine (1) 09/01/2025 9/30/2024    Mammogram 02/17/2026 2/17/2025    DEXA Scan 02/17/2027 2/17/2025    TETANUS VACCINE 05/02/2027 5/2/2017    Hemoglobin A1c (Diabetic Prevention Screening) 07/09/2028 7/9/2025    Lipid Panel 07/09/2030 7/9/2025    Colorectal Cancer Screening 02/08/2032 2/8/2022        No orders of the defined types were placed in this encounter.    The following information is provided to all patients.  This information is to help you find resources for any of the problems found today that may be affecting your health:                  Living healthy guide: www.[facility].[place].gov      Understanding Diabetes: www.diabetes.org      Eating healthy: www.cdc.gov/healthyweight      [facility] home safety checklist: www.cdc.gov/steadi/patient.html      Agency on Aging: www.goea.[place].gov      Alcoholics anonymous (AA): www.aa.org      Physical Activity: www.erma.nih.gov/jy4vgyp      Tobacco use: www.quitwithusla.org

## 2025-07-19 NOTE — PROGRESS NOTES
Subjective:       Patient ID: Earnestine Petit is a 64 y.o. female.    Chief Complaint: No chief complaint on file.      HPI  Earnestine Petit is a 64 y.o. female with *** who presents today for No chief complaint on file.  .    History of Present Illness               Review of Systems   Past Medical History:   Diagnosis Date    Allergy     Arthritis     RH    Cataract     Glaucoma     Hyperlipidemia     Hypertension     Osteoarthritis     Osteoporosis     Palpitations     Rheumatoid arthritis     Seasonal allergies        Past Surgical History:   Procedure Laterality Date    ADENOIDECTOMY      TONSILLECTOMY         Family History   Problem Relation Name Age of Onset    Diabetes Mother Earnestine Velasquez     Cancer Father Chris Velasquez         Stomach or pancreas    Heart disease Father Chris Velasquez     Hypertension Father Chris Velasquez     Hyperlipidemia Sister 1     Hypertension Sister 1     Heart disease Sister 1     Rheum arthritis Sister 1     Cancer Brother 1Jose Braxton     Diabetes Maternal Grandmother Tiffanie Castillo     Heart disease Maternal Grandfather      Allergies Daughter      Asthma Neg Hx         Social History     Socioeconomic History    Marital status:    Tobacco Use    Smoking status: Never     Passive exposure: Never    Smokeless tobacco: Never   Substance and Sexual Activity    Alcohol use: Yes     Alcohol/week: 7.0 standard drinks of alcohol     Types: 7 Glasses of wine per week     Comment: A glass of wine daily    Drug use: Never    Sexual activity: Not Currently     Partners: Male     Birth control/protection: None     Social Drivers of Health     Financial Resource Strain: Low Risk  (7/17/2025)    Overall Financial Resource Strain (CARDIA)     Difficulty of Paying Living Expenses: Not very hard   Food Insecurity: No Food Insecurity (7/17/2025)    Hunger Vital Sign     Worried About Running Out of Food in the Last Year: Never true     Ran Out of Food in the Last Year: Never true   Transportation  "Needs: No Transportation Needs (7/17/2025)    PRAPARE - Transportation     Lack of Transportation (Medical): No     Lack of Transportation (Non-Medical): No   Physical Activity: Insufficiently Active (7/17/2025)    Exercise Vital Sign     Days of Exercise per Week: 3 days     Minutes of Exercise per Session: 30 min   Stress: No Stress Concern Present (7/17/2025)    [employer] [place] [employer] - Occupational Stress Questionnaire     Feeling of Stress : Not at all   Housing Stability: Low Risk  (7/17/2025)    Housing Stability Vital Sign     Unable to Pay for Housing in the Last Year: No     Number of Times Moved in the Last Year: 0     Homeless in the Last Year: No       Current Medications[1]    Review of patient's allergies indicates:   Allergen Reactions    Cat dander Other (See Comments)     Eyes itching and sneezing     Dog dander Other (See Comments)     Eyes starting itching and sneezing          Objective:       Last 3 sets of Vitals        2/25/2025    10:05 AM 7/10/2025    11:27 AM 7/17/2025     8:31 AM   Vitals - 1 value per visit   SYSTOLIC 108 124 110   DIASTOLIC 80 79 80   Pulse 68 69 60   SPO2 99 %  99 %   Weight (lb) 146.28 151.46 149.03   Weight (kg) 66.35 68.7 67.6   Height  5' 4" (1.626 m) 5' 4" (1.626 m)   BMI (Calculated)  26 25.6   Pain Score  Zero Zero   Physical Exam      CBC:  Recent Labs   Lab 02/04/25  1105 03/10/25  1110 07/09/25  0907   WBC 10.43 7.24 7.22   RBC 3.97 L 4.17 3.88 L   Hemoglobin 12.5 12.6  --    HGB  --   --  11.8 L   Hematocrit 38.2 41.4  --    HCT  --   --  37.7   Platelet Count  --   --  222   Platelets 247 251  --    MCV 96 99 H 97   MCH 31.5 H 30.2 30.4   MCHC 32.7 30.4 L 31.3 L     CMP:  Recent Labs   Lab 02/04/25  1105 03/10/25  1110 07/09/25  0907   Glucose 104 95 100   Calcium 10.1 9.2 9.7   Albumin 4.3 4.2 4.2   Protein Total  --   --  7.2   Total Protein 8.0 7.3  --    Sodium 139 135 L 139   Potassium 4.4 4.4 4.7   CO2 21 L 22 L 25   Chloride 107 106 " 109   BUN 15 15 16   Creatinine 0.8 0.8 0.7   Alkaline Phosphatase 66 61  --    ALP  --   --  79   ALT 29 31 35   AST 26 33 26   Total Bilirubin 0.5 0.4  --    Bilirubin Total  --   --  0.3     URINALYSIS:  Recent Labs   Lab 07/09/25  0919   Color, UA Yellow   Spec Grav UA 1.010   pH, UA 7.0   Protein, UA Negative   Bacteria, UA Rare   Nitrites, UA Negative   Leukocyte Esterase, UA Trace A   Urobilinogen, UA Negative      LIPIDS:  Recent Labs   Lab 03/10/23  0825 08/16/24  1111 02/04/25  1105 07/09/25  0907   TSH 3.056  --  1.893  --    HDL 69 48  --   --    HDL Cholesterol  --   --   --  57   Cholesterol Total  --   --   --  162   Cholesterol 173 153  --   --    Triglycerides 79 111  --   --    Triglyceride  --   --   --  172 H   LDL Cholesterol 88.2 82.8  --  70.6   HDL/Cholesterol Ratio 39.9 31.4  --  35.2   Non-HDL Cholesterol 104 105  --   --    Non HDL Cholesterol  --   --   --  105   Total Cholesterol/HDL Ratio 2.5 3.2  --   --    Cholesterol/HDL Ratio  --   --   --  2.8     TSH:  Recent Labs   Lab 03/10/23  0825 02/04/25  1105   TSH 3.056 1.893       A1C:  Recent Labs   Lab 03/10/23  0825 08/16/24  1111 07/09/25  0907   Hemoglobin A1C 5.3 5.5  --    Hemoglobin A1c  --   --  5.4       Imaging:  Mammo Digital Screening Bilat w/ Neno (XPD)  Narrative: Facility:  [facility] [address] [place] [address]    Name: Earnestine Petit    MRN: [id number]    Result:  Mammo Digital Screening Bilat w/ Neno (XPD)    History:  Patient is 64 y.o. and is seen for a screening mammogram.    Films Compared:  Prior images (if available) were compared.     Findings:  This procedure was performed using tomosynthesis.   Computer-aided detection was utilized in the interpretation of this   examination.    There are scattered areas of fibroglandular density. There is no evidence   of suspicious masses, microcalcifications or architectural distortion.  Impression:    No mammographic evidence of  malignancy.    BI-RADS Category 1: Negative    Recommendation:  Routine screening mammogram in 1 year is recommended.    Your estimated lifetime risk of breast cancer (to age 85) based on   Tyrer-Cuzick risk assessment model is 5%.  According to the American   Cancer Society, patients with a lifetime breast cancer risk of 20% or   higher might benefit from supplemental screening tests, such as screening   breast MRI.    Sanya Wei MD      Assessment:       1. Encounter for hepatitis C screening test for low risk patient            Plan:       1. Encounter for hepatitis C screening test for low risk patient  -     HEPATITIS C ANTIBODY       Assessment & Plan             Health Maintenance Due   Topic Date Due    COVID-19 Vaccine (3 - Moderna risk series) 08/19/2022        This includes face to face time and non-face to face time preparing to see the patient (eg, review of tests), obtaining and/or reviewing separately obtained history, documenting clinical information in the electronic or other health record, independently interpreting results and communicating results to the patient/family/caregiver, or care coordinator.     RETURN TO CLINIC IN: {FOLLOW UP TIMES:03826}    FOR NEXT VISIT: {FOLLOW UP TOPICS:85656}       Vesta Peña MD  [facility]  Disclaimer:  This note has been generated using voice-recognition software. There may be grammatical or spelling errors that have been missed during proof-reading      This note was generated with the assistance of ambient listening technology. Verbal consent was obtained by the patient and accompanying visitor(s) for the recording of patient appointment to facilitate this note. I attest to having reviewed and edited the generated note for accuracy, though some syntax or spelling errors may persist. Please contact the author of this note for any clarification.          [1]   Current Outpatient Medications   Medication Sig Dispense Refill    atorvastatin  (LIPITOR) 40 MG tablet Take 1 tablet (40 mg total) by mouth every evening. 90 tablet 3    azelastine (ASTELIN) 137 mcg (0.1 %) nasal spray 2 sprays (274 mcg total) by Nasal route 2 (two) times daily. 30 mL 3    calcium carbonate (OS-ALIYAH) 600 mg calcium (1,500 mg) Tab Take 1,200 mg by mouth once daily at 6am.      cetirizine (ZYRTEC) 10 MG tablet Take 10 mg by mouth once daily.      cholecalciferol, vitamin D3, (VITAMIN D3) 50 mcg (2,000 unit) Cap Take 1 tablet by mouth once daily.      denosumab (PROLIA) 60 mg/mL Syrg Inject 1 mL (60 mg total) into the skin every 6 (six) months. 1 mL 6    dorzolamide-timolol 2-0.5% (COSOPT) 22.3-6.8 mg/mL ophthalmic solution Place 1 drop into both eyes 2 (two) times daily.      etanercept (ENBREL SURECLICK) 50 mg/mL (1 mL) Inject 1 mL (50 mg total) into the skin once a week. 4 mL 11    ferrous fumarate/vit Bcomp,C (SUPER B COMPLEX ORAL) Take 1 tablet by mouth.      fluticasone propionate (FLONASE) 50 mcg/actuation nasal spray 2 sprays (100 mcg total) by Each Nostril route once daily. 16 g 12    folic acid (FOLVITE) 1 MG tablet Take 2 tablets (2 mg total) by mouth once daily. 180 tablet 3    glucosamine HCl 1,500 mg Tab Take 1 tablet by mouth.      Lactobac no.41/Bifidobact no.7 (PROBIOTIC-10 ORAL) Take 1 capsule by mouth.      latanoprost 0.005 % ophthalmic solution PLACE ONE DROP IN EACH EYE AT BEDTIME      lisinopriL (PRINIVIL,ZESTRIL) 5 MG tablet TAKE 1 TABLET (5 MG TOTAL) BY MOUTH ONCE DAILY. 90 tablet 3    magnesium glycinate 100 mg Tab Take 1 tablet by mouth nightly. 30 tablet 11    melatonin 5 mg Cap Take 1 capsule by mouth every evening.      meloxicam (MOBIC) 7.5 MG tablet Take 1 tablet (7.5 mg total) by mouth once daily. May increase to 2 tablets if needed for pain. (Patient not taking: Reported on 7/17/2025) 30 tablet 0    methotrexate 2.5 MG Tab Take 5 tablets (12.5 mg total) by mouth every 7 days. 60 tablet 3    metoprolol tartrate (LOPRESSOR) 25 MG tablet TAKE 1 TABLET  (25 MG TOTAL) BY MOUTH NIGHTLY. 90 tablet 3    traZODone (DESYREL) 50 MG tablet TAKE 2 TABLETS (100 MG TOTAL) BY MOUTH EVERY EVENING. 180 tablet 2     No current facility-administered medications for this visit.

## 2025-08-01 ENCOUNTER — OFFICE VISIT (OUTPATIENT)
Dept: PRIMARY CARE CLINIC | Facility: CLINIC | Age: 65
End: 2025-08-01
Payer: MEDICARE

## 2025-08-01 VITALS
DIASTOLIC BLOOD PRESSURE: 77 MMHG | WEIGHT: 150.69 LBS | BODY MASS INDEX: 25.73 KG/M2 | OXYGEN SATURATION: 98 % | HEART RATE: 61 BPM | HEIGHT: 64 IN | SYSTOLIC BLOOD PRESSURE: 119 MMHG

## 2025-08-01 DIAGNOSIS — M81.0 OSTEOPOROSIS WITHOUT PATHOLOGICAL FRACTURE: ICD-10-CM

## 2025-08-01 DIAGNOSIS — H40.43X0 GLAUCOMA OF BOTH EYES SECONDARY TO INFLAMMATION, UNSPECIFIED GLAUCOMA STAGE: ICD-10-CM

## 2025-08-01 DIAGNOSIS — I10 HYPERTENSION, UNSPECIFIED TYPE: Primary | ICD-10-CM

## 2025-08-01 DIAGNOSIS — Z00.00 HEALTHCARE MAINTENANCE: ICD-10-CM

## 2025-08-01 DIAGNOSIS — F41.9 ANXIETY: ICD-10-CM

## 2025-08-01 DIAGNOSIS — F51.01 PRIMARY INSOMNIA: ICD-10-CM

## 2025-08-01 DIAGNOSIS — K59.00 CONSTIPATION, UNSPECIFIED CONSTIPATION TYPE: ICD-10-CM

## 2025-08-01 DIAGNOSIS — E78.5 HYPERLIPIDEMIA, UNSPECIFIED HYPERLIPIDEMIA TYPE: ICD-10-CM

## 2025-08-01 PROCEDURE — 1159F MED LIST DOCD IN RCRD: CPT | Mod: CPTII,HCNC,S$GLB, | Performed by: INTERNAL MEDICINE

## 2025-08-01 PROCEDURE — 1126F AMNT PAIN NOTED NONE PRSNT: CPT | Mod: CPTII,HCNC,S$GLB, | Performed by: INTERNAL MEDICINE

## 2025-08-01 PROCEDURE — 4010F ACE/ARB THERAPY RXD/TAKEN: CPT | Mod: CPTII,HCNC,S$GLB, | Performed by: INTERNAL MEDICINE

## 2025-08-01 PROCEDURE — 1170F FXNL STATUS ASSESSED: CPT | Mod: CPTII,HCNC,S$GLB, | Performed by: INTERNAL MEDICINE

## 2025-08-01 PROCEDURE — 3288F FALL RISK ASSESSMENT DOCD: CPT | Mod: CPTII,HCNC,S$GLB, | Performed by: INTERNAL MEDICINE

## 2025-08-01 PROCEDURE — 3008F BODY MASS INDEX DOCD: CPT | Mod: CPTII,HCNC,S$GLB, | Performed by: INTERNAL MEDICINE

## 2025-08-01 PROCEDURE — 3074F SYST BP LT 130 MM HG: CPT | Mod: CPTII,HCNC,S$GLB, | Performed by: INTERNAL MEDICINE

## 2025-08-01 PROCEDURE — 3078F DIAST BP <80 MM HG: CPT | Mod: CPTII,HCNC,S$GLB, | Performed by: INTERNAL MEDICINE

## 2025-08-01 PROCEDURE — 1123F ACP DISCUSS/DSCN MKR DOCD: CPT | Mod: CPTII,HCNC,S$GLB, | Performed by: INTERNAL MEDICINE

## 2025-08-01 PROCEDURE — 1101F PT FALLS ASSESS-DOCD LE1/YR: CPT | Mod: CPTII,HCNC,S$GLB, | Performed by: INTERNAL MEDICINE

## 2025-08-01 PROCEDURE — 99999 PR PBB SHADOW E&M-EST. PATIENT-LVL IV: CPT | Mod: PBBFAC,HCNC,, | Performed by: INTERNAL MEDICINE

## 2025-08-01 PROCEDURE — 1160F RVW MEDS BY RX/DR IN RCRD: CPT | Mod: CPTII,HCNC,S$GLB, | Performed by: INTERNAL MEDICINE

## 2025-08-01 PROCEDURE — 99214 OFFICE O/P EST MOD 30 MIN: CPT | Mod: HCNC,S$GLB,, | Performed by: INTERNAL MEDICINE

## 2025-08-01 PROCEDURE — 3044F HG A1C LEVEL LT 7.0%: CPT | Mod: CPTII,HCNC,S$GLB, | Performed by: INTERNAL MEDICINE

## 2025-08-02 ENCOUNTER — PATIENT MESSAGE (OUTPATIENT)
Dept: ADMINISTRATIVE | Facility: OTHER | Age: 65
End: 2025-08-02
Payer: MEDICARE

## 2025-08-03 PROBLEM — K59.00 CONSTIPATION: Status: ACTIVE | Noted: 2025-08-03

## 2025-08-03 PROBLEM — H40.43X0: Status: ACTIVE | Noted: 2025-08-03

## 2025-08-04 NOTE — ASSESSMENT & PLAN NOTE
- Earnestine's triglycerides have increased slightly, but LDL cholesterol has improved.  - Advised to monitor diet to manage triglyceride levels.  - Discussed good vs. bad cholesterol and impact of diet on triglyceride levels.

## 2025-08-04 NOTE — ASSESSMENT & PLAN NOTE
- Yearly labs- 02/04/25  - Colon cancer screening- 02/08/2022, repeat in 10 years.  - mammogram- 02/26/2024  - ACP- documents in chart. Reviewed on 8/1/25.  - DEXA- 02/17/25, on Prolia..  - Vaccination- due for COVID vaccine.

## 2025-08-04 NOTE — ASSESSMENT & PLAN NOTE
- Scheduled the patient for Prolia treatment at the end of the month.  - Confirmed the patient's kidney function looks good, which is important for osteoporosis management.  - Instructed the patient to make an appointment with the nurse for Prolia administration.

## 2025-08-04 NOTE — PROGRESS NOTES
Subjective:       Patient ID: Earnestine Petit is a 64 y.o. female.    Chief Complaint: Annual Exam      HPI  Earnestine Petit is a 64 y.o. female with rheumatoid arthritis,hypertension, hyperlipidemia, allergic rhinitis, osteoarthritis, insomnia, anxiety, osteoporosis  who presents today for Annual Exam    Earnestine presents today for follow up.    Reports occasional joint pain and flare ups of RA. Had follow up with rheumatology, she is on Enbrel and MTX, and considering changing treatment to Humira. She is scheduled to receive Prolia injection at the end of the month.     Hemoglobin was slightly low at 11.8. Hematocrit, kidney function, liver function, and calcium were within normal limits. Lipid panel showed improvement in LDL cholesterol with slightly elevated triglycerides.    She reports significant ongoing stress and anxiety, characterized by excessive worrying about multiple life aspects including financial responsibilities and long-term family management. She utilizes prayer as a primary coping mechanism. Her mood is good with overall feeling of well-being. She experiences mild fatigue in the evening after dinner.    She reports intermittent difficulty initiating and maintaining sleep. She takes trazodone and melatonin 10mg for management. During occasional sleepless nights, she takes additional melatonin. She denies significant daytime fatigue.    She reports mild short-term memory difficulties, characterized by forgetting tasks when moving between rooms. She describes these memory issues as her usual experience.    She performs regular weight training with three-pound weights, completing 30-40 repetitions nightly. She experiences localized forehead headaches during exercise that resolve within minutes after completion. She walks early mornings at the park to avoid heat, spends up to 1-1.5 hours walking at [facility], and frequently walks around home.    She maintains a healthy diet with breakfast at 11 AM consisting  of avocado toast, mixed greens, and two [id number] eggs. Daily intake includes blueberries, nuts, almonds, coconut, trail mix, and Swiss cheese. She avoids pasta, sugar, and rice, preferring whole grain bread with seeds. She occasionally consumes pork ribs with barbecue.    She follows with ophthalmology every four months for glaucoma management. Eye pressure remains stable. She experiences significant visual blurriness attributed to glaucoma eye drops and uses additional drops for dryness. She denies eye pain but reports headaches potentially related to medication. She has family history of vision loss on paternal side.    She reports frequent nighttime urination after consuming large volumes of water before bedtime, spending 1-1.5 hours getting up after going to bed. She denies urgency or incontinence. She can return to sleep without difficulty and notes urination becomes more challenging with inadequate hydration.    She reports chronic constipation managed with occasional Miralax use, which she attributes to inadequate water intake. She denies current bowel inflammation and notes that walking helps prevent inflammation.          ROS:  General: -fever, -chills, +fatigue, -weight gain, -weight loss, +eating causes fatigue, +increased energy levels  Eyes: -vision changes, -redness, -discharge, +blurry vision, +dry eyes  ENT: -ear pain, -nasal congestion, -sore throat  Cardiovascular: -chest pain, -palpitations, -lower extremity edema  Respiratory: -cough, -shortness of breath  Gastrointestinal: -abdominal pain, -nausea, -vomiting, -diarrhea, +constipation, -blood in stool  Genitourinary: -dysuria, -hematuria, -frequency, +nocturia  Musculoskeletal: -joint pain, -muscle pain  Skin: -rash, -lesion  Neurological: +headache, -dizziness, -numbness, -tingling, +memory problems  Psychiatric: +anxiety, -depression, +sleep difficulty          4Ms for Medical Decision-Making in Older Adults    Last Completed EAWV:  2025    MEDICATIONS:  High Risk Medications:  Total Active Medications: 0  This patient does not have an active medication from one of the medication groupers.    MOBILITY:  Activities of Daily Livin/3/2025     9:23 PM   Activities of Daily Living   Ambulation Independent   Dressing Independent   Transfers Independent   Toileting Continent of bladder   Feeding Independent   Cleaning home/Chores Independent   Telephone use Independent   Shopping Independent   Paying bills Independent   Taking meds Independent     Fall Risk:      2025     3:40 PM 2025     8:30 AM 7/10/2025    11:30 AM   Fall Risk Assessment - Outpatient   Mobility Status Ambulatory Ambulatory Ambulatory   Number of falls 0 0 0   Identified as fall risk False False False     Disability Status:      8/3/2025     9:29 PM   Disability Status   Are you deaf or do you have serious difficulty hearing? N   Are you blind or do you have serious difficulty seeing, even when wearing glasses? N   Because of a physical, mental, or emotional condition, do you have serious difficulty concentrating, remembering, or making decisions? N   Do you have serious difficulty walking or climbing stairs? N   Do you have difficulty dressing or bathing? N   Because of a physical, mental, or emotional condition, do you have difficulty doing errands alone such as visiting a doctor's office or shopping? N     Nutrition Screenin/3/2025     9:29 PM   Nutrition Screening   Has food intake declined over the past three months due to loss of appetite, digestive problems, chewing or swallowing difficulties? No decrease in food intake   Involuntary weight loss during the last 3 months? No weight loss   Mobility? Goes out   Has the patient suffered psychological stress or acute disease in the past three months? No   Neuropsychological problems? No psychological problems   Body Mass Index (BMI)?  BMI 23 or greater   Screening Score 14   Interpretation Normal  nutritional status    Screening Score: 0-7 Malnourished, 8-11 At Risk, 12-14 Normal  Get Up and Go:      2025     8:35 AM   Get Up and Go   Trial 1 9 seconds     Whisper Test:      2025     8:35 AM   Whisper Test   Whisper Test Normal           MENTATION:   Has Dementia Dx: No  Has Anxiety Dx: Yes    Depression Patient Health Questionnaire:      2025     8:29 AM   Depression Patient Health Questionnaire   Over the last two weeks how often have you been bothered by little interest or pleasure in doing things Not at all   Over the last two weeks how often have you been bothered by feeling down, depressed or hopeless Not at all   PHQ-2 Total Score 0     Cognitive Function Screenin/17/2025     8:35 AM   Cognitive Function Screening   Clock Drawing Test 2   Mini-Cog 3 Minute Recall 1   Cognitive Function Screening 3     Cognitive Function Screening Total - Less than 4 = Abnormal,  Greater than or equal to 4 = Normal        WHAT MATTERS MOST:  Advance Care Planning   ACP Status:   Patient has had an ACP conversation  Living Will: Yes  Power of : Yes  POLST: No      Living Will  During this visit, I engaged the patient  in the voluntary advance care planning process.  The patient and I reviewed the role for advance directives and their purpose in directing future healthcare if the patient's unable to speak for him/herself.  At this point in time, the patient does have full decision-making capacity.  We discussed different extreme health states that she could experience, and reviewed what kind of medical care she would want in those situations.  The patient communicated that if she were comatose and had little chance of a meaningful recovery, she would not want machines/life-sustaining treatments used. The patient has completed a living will to reflect these preferences and The patient has already designated a healthcare power of  to make decisions on her behalf.  I spent a total of 7  minutes engaging the patient in this advance care planning discussion.          Power of   I initiated the process of voluntary advance care planning today and explained the importance of this process to the patient.  I introduced the concept of advance directives to the patient, as well. Then the patient received detailed information about the importance of designating a Health Care Power of  (HCPOA). She was also instructed to communicate with this person about their wishes for future healthcare, should she become sick and lose decision-making capacity. The patient has previously appointed a HCPOA. After our discussion, the patient has decided to complete a HCPOA and has appointed her daughter, health care agent: Leelee Toro & health care agent number: [phone], [phone]. I encouraged her to communicate with this person about their wishes for future healthcare, should she become sick and lose decision-making capacity.      A total of 7 min was spent on advance care planning. This discussion occurred on a fully voluntary basis with the verbal consent of the patient and/or family.          Past Medical History:   Diagnosis Date    Allergy     Arthritis     RH    Cataract     Glaucoma     Hyperlipidemia     Hypertension     Osteoarthritis     Osteoporosis     Palpitations     Rheumatoid arthritis     Seasonal allergies        Past Surgical History:   Procedure Laterality Date    ADENOIDECTOMY      TONSILLECTOMY         Family History   Problem Relation Name Age of Onset    Diabetes Mother Earnestine Velasquez     Cancer Father Chris Velasquez         Stomach or pancreas    Heart disease Father Chris Velasquez     Hypertension Father Chris Velasquez     Hyperlipidemia Sister 1     Hypertension Sister 1     Heart disease Sister 1     Rheum arthritis Sister 1     Cancer Brother Mey Limon     Diabetes Maternal Grandmother Tiffanie Castillo     Heart disease Maternal Grandfather      Allergies  Daughter      Asthma Neg Hx         Social History     Socioeconomic History    Marital status:    Tobacco Use    Smoking status: Never     Passive exposure: Never    Smokeless tobacco: Never   Substance and Sexual Activity    Alcohol use: Yes     Alcohol/week: 7.0 standard drinks of alcohol     Types: 7 Glasses of wine per week     Comment: A glass of wine daily    Drug use: Never    Sexual activity: Not Currently     Partners: Male     Birth control/protection: None     Social Drivers of Health     Financial Resource Strain: Low Risk  (7/17/2025)    Overall Financial Resource Strain (CARDIA)     Difficulty of Paying Living Expenses: Not very hard   Food Insecurity: No Food Insecurity (7/17/2025)    Hunger Vital Sign     Worried About Running Out of Food in the Last Year: Never true     Ran Out of Food in the Last Year: Never true   Transportation Needs: No Transportation Needs (7/17/2025)    PRAPARE - Transportation     Lack of Transportation (Medical): No     Lack of Transportation (Non-Medical): No   Physical Activity: Insufficiently Active (7/17/2025)    Exercise Vital Sign     Days of Exercise per Week: 3 days     Minutes of Exercise per Session: 30 min   Stress: No Stress Concern Present (7/17/2025)    [employer] - Occupational Stress Questionnaire     Feeling of Stress : Not at all   Housing Stability: Low Risk  (7/17/2025)    Housing Stability Vital Sign     Unable to Pay for Housing in the Last Year: No     Number of Times Moved in the Last Year: 0     Homeless in the Last Year: No       Current Medications[1]    Review of patient's allergies indicates:   Allergen Reactions    Cat dander Other (See Comments)     Eyes itching and sneezing     Dog dander Other (See Comments)     Eyes starting itching and sneezing          Objective:       Last 3 sets of Vitals        7/10/2025    11:27 AM 7/17/2025     8:31 AM 8/1/2025     3:57 PM   Vitals - 1 value per visit   SYSTOLIC  "124 110 119   DIASTOLIC 79 80 77   Pulse 69 60 61   SPO2  99 % 98 %   Weight (lb) 151.46 149.03 150.68   Weight (kg) 68.7 67.6 68.35   Height 5' 4" (1.626 m) 5' 4" (1.626 m) 5' 4" (1.626 m)   BMI (Calculated) 26 25.6 25.9   Pain Score Zero Zero Zero   Physical Exam  Constitutional:       General: She is not in acute distress.  HENT:      Head: Normocephalic.      Right Ear: Tympanic membrane, ear canal and external ear normal.      Left Ear: Tympanic membrane, ear canal and external ear normal.      Nose: Nose normal.      Mouth/Throat:      Mouth: Mucous membranes are moist.   Eyes:      General: No scleral icterus.     Extraocular Movements: Extraocular movements intact.      Conjunctiva/sclera: Conjunctivae normal.   Neck:      Vascular: No carotid bruit.      Comments: No goiter.  Cardiovascular:      Rate and Rhythm: Normal rate and regular rhythm.      Pulses: Normal pulses.      Heart sounds: Normal heart sounds.   Pulmonary:      Effort: Pulmonary effort is normal.      Breath sounds: Normal breath sounds.   Abdominal:      General: Bowel sounds are normal. There is no distension.      Palpations: Abdomen is soft. There is no mass.      Tenderness: There is no abdominal tenderness.   Musculoskeletal:         General: No swelling.   Lymphadenopathy:      Cervical: No cervical adenopathy.   Skin:     General: Skin is warm and dry.   Neurological:      General: No focal deficit present.      Mental Status: She is alert and oriented to person, place, and time.   Psychiatric:         Mood and Affect: Mood normal.         Behavior: Behavior normal.           CBC:  Recent Labs   Lab 02/04/25  1105 03/10/25  1110 07/09/25  0907   WBC 10.43 7.24 7.22   RBC 3.97 L 4.17 3.88 L   Hemoglobin 12.5 12.6  --    HGB  --   --  11.8 L   Hematocrit 38.2 41.4  --    HCT  --   --  37.7   Platelet Count  --   --  222   Platelets 247 251  --    MCV 96 99 H 97   MCH 31.5 H 30.2 30.4   MCHC 32.7 30.4 L 31.3 L     CMP:  Recent Labs "   Lab 02/04/25  1105 03/10/25  1110 07/09/25  0907   Glucose 104 95 100   Calcium 10.1 9.2 9.7   Albumin 4.3 4.2 4.2   Protein Total  --   --  7.2   Total Protein 8.0 7.3  --    Sodium 139 135 L 139   Potassium 4.4 4.4 4.7   CO2 21 L 22 L 25   Chloride 107 106 109   BUN 15 15 16   Creatinine 0.8 0.8 0.7   Alkaline Phosphatase 66 61  --    ALP  --   --  79   ALT 29 31 35   AST 26 33 26   Total Bilirubin 0.5 0.4  --    Bilirubin Total  --   --  0.3     URINALYSIS:  Recent Labs   Lab 07/09/25  0919   Color, UA Yellow   Spec Grav UA 1.010   pH, UA 7.0   Protein, UA Negative   Bacteria, UA Rare   Nitrites, UA Negative   Leukocyte Esterase, UA Trace A   Urobilinogen, UA Negative      LIPIDS:  Recent Labs   Lab 03/10/23  0825 08/16/24  1111 02/04/25  1105 07/09/25  0907   TSH 3.056  --  1.893  --    HDL 69 48  --   --    HDL Cholesterol  --   --   --  57   Cholesterol Total  --   --   --  162   Cholesterol 173 153  --   --    Triglycerides 79 111  --   --    Triglyceride  --   --   --  172 H   LDL Cholesterol 88.2 82.8  --  70.6   HDL/Cholesterol Ratio 39.9 31.4  --  35.2   Non-HDL Cholesterol 104 105  --   --    Non HDL Cholesterol  --   --   --  105   Total Cholesterol/HDL Ratio 2.5 3.2  --   --    Cholesterol/HDL Ratio  --   --   --  2.8     TSH:  Recent Labs   Lab 03/10/23  0825 02/04/25  1105   TSH 3.056 1.893       A1C:  Recent Labs   Lab 03/10/23  0825 08/16/24  1111 07/09/25  0907   Hemoglobin A1C 5.3 5.5  --    Hemoglobin A1c  --   --  5.4       Imaging:  Mammo Digital Screening Bilat w/ Neno (XPD)  Narrative: Facility:  [facility] [address] [place] [address]    Name: Earnestine Petit    MRN: [id number]    Result:  Mammo Digital Screening Bilat w/ Neno (XPD)    History:  Patient is 64 y.o. and is seen for a screening mammogram.    Films Compared:  Prior images (if available) were compared.     Findings:  This procedure was performed using tomosynthesis.   Computer-aided  detection was utilized in the interpretation of this   examination.    There are scattered areas of fibroglandular density. There is no evidence   of suspicious masses, microcalcifications or architectural distortion.  Impression:    No mammographic evidence of malignancy.    BI-RADS Category 1: Negative    Recommendation:  Routine screening mammogram in 1 year is recommended.    Your estimated lifetime risk of breast cancer (to age 85) based on   Tyrer-Cuzick risk assessment model is 5%.  According to the American   Cancer Society, patients with a lifetime breast cancer risk of 20% or   higher might benefit from supplemental screening tests, such as screening   breast MRI.    Sanya Wei MD      Assessment:       1. Hypertension, unspecified type    2. Hyperlipidemia, unspecified hyperlipidemia type    3. Osteoporosis without pathological fracture    4. Constipation, unspecified constipation type    5. Primary insomnia    6. Anxiety    7. 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 Healthcare maintenance            Plan:       1. Hypertension, unspecified type  Overview:  On lisinopril 5 mg day and metoprolol 25 mg nightly.      2. Hyperlipidemia, unspecified hyperlipidemia type  Overview:  On atorvastatin 20 mg nightly    Assessment & Plan:  - Earnestine's triglycerides have increased slightly, but LDL cholesterol has improved.  - Advised to monitor diet to manage triglyceride levels.  - Discussed good vs. bad cholesterol and impact of diet on triglyceride levels.      3. Osteoporosis without pathological fracture  Overview:  On Prolia and supplements.    Assessment & Plan:  - Scheduled the patient for Prolia treatment at the end of the month.  - Confirmed the patient's kidney function looks good, which is important for osteoporosis management.  - Instructed the patient to make an appointment with the nurse for Prolia administration.      4. Constipation, unspecified  constipation type  Assessment & Plan:  - Earnestine reports difficulty with bowel movements when not   drinking enough water and uses Miralax for assistance.  - Discussed importance of proper hydration for bowel regularity.      5. Primary insomnia  Overview:  Taking trazodone 100 mg nightly most of the time but sometimes will take 50 mg more.  Also takes melatonin 5 mg nightly.  Antihistamines have not helped.    Assessment & Plan:  - Earnestine experiences insomnia despite taking trazodone and melatonin.  - Advised to exercise earlier in the day to help with sleep and avoid interfering with nighttime rest.      6. Anxiety  Assessment & Plan:  - Earnestine experiences stress about future events and uses prayer as a coping mechanism for anxiety.  - Also uses chamomile tea and Miralax to manage anxiety-related symptoms.  - Recommend continuing these stress management techniques when feeling anxious about the future.  - Trazodone may help with mood.  - Consider LCSW evaluation for therapy.      7. 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  - Verified the patient uses eye drops for glaucoma management and continues regular visits to the specialist every 4 months for monitoring.      8. Healthcare maintenance  Assessment & Plan:  - Yearly labs- 02/04/25  - Colon cancer screening- 02/08/2022, repeat in 10 years.  - mammogram- 02/26/2024  - ACP- documents in chart. Reviewed on 8/1/25.  - DEXA- 02/17/25, on Prolia..  - Vaccination- due for COVID vaccine.         Assessment & Plan    > Reviewed lab results: hemoglobin slightly low but hematocrit normal, kidney and liver function good.  > CO2 levels fluctuating but no acidosis present.  > Lipid panel shows improved LDL, slightly elevated triglycerides; no medication changes needed at this time.  > Discussed exercise routine and associated headaches; advised modifying weight  lifting technique.  > Considered potential side effects of eye drops for glaucoma, including headaches.  > Evaluated sleep patterns and current use of sleep aids.      EXERTIONAL HEADACHE:  - Earnestine experiences headaches during weight lifting exercises, localized to the forehead, which subsides shortly after stopping.  - Evaluated that these headaches are not related to blood pressure changes but likely due to neck strain.  - Advised to modify exercise routine by reducing weight lifting range and using lighter weights.  - Specifically recommended lowering arms only residential up instead of full range of motion.    NOCTURIA:  - Earnestine experiences frequent urination at night after drinking a large glass of water before bed, but denies urgency or incontinence.  - Advised to manage water intake before bedtime to reduce nocturia.      GENERAL HEALTH MAINTENANCE:  - Earnestine to maintain current diet with focus on healthy fats and whole grains.  - Earnestine to continue walking for exercise.    FOLLOW-UP:  - Follow up in 6 months for next appointment, which will fall in January of next year.  - Earnestine will also return at the end of the month for Prolia injection.        Health Maintenance Due   Topic Date Due    COVID-19 Vaccine (3 - Moderna risk series) 08/19/2022        I spent a total of 35 minutes on the day of the visit.This includes face to face time and non-face to face time preparing to see the patient (eg, review of tests), obtaining and/or reviewing separately obtained history, documenting clinical information in the electronic or other health record, independently interpreting results and communicating results to the patient/family/caregiver, or care coordinator.     RETURN TO CLINIC IN: 6 MONTHS    FOR NEXT VISIT: MEDICATION MONITORING and CARE GAPS       Vesta Peña MD  [facility]  Disclaimer:  This note has been generated using voice-recognition software. There may be grammatical or spelling errors that have been  missed during proof-reading      This note was generated with the assistance of ambient listening technology. Verbal consent was obtained by the patient and accompanying visitor(s) for the recording of patient appointment to facilitate this note. I attest to having reviewed and edited the generated note for accuracy, though some syntax or spelling errors may persist. Please contact the author of this note for any clarification.          [1]   Current Outpatient Medications   Medication Sig Dispense Refill    atorvastatin (LIPITOR) 40 MG tablet Take 1 tablet (40 mg total) by mouth every evening. 90 tablet 3    calcium carbonate (OS-ALIYAH) 600 mg calcium (1,500 mg) Tab Take 1,200 mg by mouth once daily at 6am.      cetirizine (ZYRTEC) 10 MG tablet Take 10 mg by mouth once daily.      cholecalciferol, vitamin D3, (VITAMIN D3) 50 mcg (2,000 unit) Cap Take 1 tablet by mouth once daily.      denosumab (PROLIA) 60 mg/mL Syrg Inject 1 mL (60 mg total) into the skin every 6 (six) months. 1 mL 6    dorzolamide-timolol 2-0.5% (COSOPT) 22.3-6.8 mg/mL ophthalmic solution Place 1 drop into both eyes 2 (two) times daily.      etanercept (ENBREL SURECLICK) 50 mg/mL (1 mL) Inject 1 mL (50 mg total) into the skin once a week. 4 mL 11    ferrous fumarate/vit Bcomp,C (SUPER B COMPLEX ORAL) Take 1 tablet by mouth.      fluticasone propionate (FLONASE) 50 mcg/actuation nasal spray 2 sprays (100 mcg total) by Each Nostril route once daily. 16 g 12    folic acid (FOLVITE) 1 MG tablet Take 2 tablets (2 mg total) by mouth once daily. 180 tablet 3    glucosamine HCl 1,500 mg Tab Take 1 tablet by mouth.      Lactobac no.41/Bifidobact no.7 (PROBIOTIC-10 ORAL) Take 1 capsule by mouth.      latanoprost 0.005 % ophthalmic solution PLACE ONE DROP IN EACH EYE AT BEDTIME      lisinopriL (PRINIVIL,ZESTRIL) 5 MG tablet TAKE 1 TABLET (5 MG TOTAL) BY MOUTH ONCE DAILY. 90 tablet 3    magnesium glycinate 100 mg Tab Take 1 tablet by mouth nightly. 30 tablet  11    melatonin 5 mg Cap Take 1 capsule by mouth every evening.      meloxicam (MOBIC) 7.5 MG tablet Take 1 tablet (7.5 mg total) by mouth once daily. May increase to 2 tablets if needed for pain. 30 tablet 0    methotrexate 2.5 MG Tab Take 5 tablets (12.5 mg total) by mouth every 7 days. 60 tablet 3    metoprolol tartrate (LOPRESSOR) 25 MG tablet TAKE 1 TABLET (25 MG TOTAL) BY MOUTH NIGHTLY. 90 tablet 3    traZODone (DESYREL) 50 MG tablet TAKE 2 TABLETS (100 MG TOTAL) BY MOUTH EVERY EVENING. 180 tablet 2    azelastine (ASTELIN) 137 mcg (0.1 %) nasal spray 2 sprays (274 mcg total) by Nasal route 2 (two) times daily. 30 mL 3     No current facility-administered medications for this visit.

## 2025-08-04 NOTE — ASSESSMENT & PLAN NOTE
- Earnestine reports difficulty with bowel movements when not   drinking enough water and uses Miralax for assistance.  - Discussed importance of proper hydration for bowel regularity.

## 2025-08-04 NOTE — ASSESSMENT & PLAN NOTE
- Earnestine's vision has been stable with no worsening of glaucoma.  - Verified the patient uses eye drops for glaucoma management and continues regular visits to the specialist every 4 months for monitoring.

## 2025-08-04 NOTE — ASSESSMENT & PLAN NOTE
- Earnestine experiences stress about future events and uses prayer as a coping mechanism for anxiety.  - Also uses chamomile tea and Miralax to manage anxiety-related symptoms.  - Recommend continuing these stress management techniques when feeling anxious about the future.  - Trazodone may help with mood.  - Consider LCSW evaluation for therapy.

## 2025-08-04 NOTE — ASSESSMENT & PLAN NOTE
- Earnestine experiences insomnia despite taking trazodone and melatonin.  - Advised to exercise earlier in the day to help with sleep and avoid interfering with nighttime rest.

## 2025-08-11 ENCOUNTER — DOCUMENTATION ONLY (OUTPATIENT)
Dept: PRIMARY CARE CLINIC | Facility: CLINIC | Age: 65
End: 2025-08-11
Payer: MEDICARE

## 2025-08-18 DIAGNOSIS — M81.0 OSTEOPOROSIS WITHOUT PATHOLOGICAL FRACTURE: ICD-10-CM

## 2025-08-18 RX ORDER — DENOSUMAB 60 MG/ML
60 INJECTION SUBCUTANEOUS
Qty: 1 ML | Refills: 6 | Status: SHIPPED | OUTPATIENT
Start: 2025-08-18

## 2025-08-20 ENCOUNTER — TELEPHONE (OUTPATIENT)
Dept: PRIMARY CARE CLINIC | Facility: CLINIC | Age: 65
End: 2025-08-20
Payer: MEDICARE

## 2025-08-27 ENCOUNTER — CLINICAL SUPPORT (OUTPATIENT)
Dept: PRIMARY CARE CLINIC | Facility: CLINIC | Age: 65
End: 2025-08-27
Payer: MEDICARE

## 2025-08-27 VITALS — SYSTOLIC BLOOD PRESSURE: 128 MMHG | DIASTOLIC BLOOD PRESSURE: 70 MMHG | HEART RATE: 57 BPM | OXYGEN SATURATION: 95 %

## 2025-08-27 DIAGNOSIS — M81.0 OSTEOPOROSIS WITHOUT PATHOLOGICAL FRACTURE: Primary | ICD-10-CM

## 2025-08-27 PROCEDURE — 96372 THER/PROPH/DIAG INJ SC/IM: CPT | Mod: HCNC,S$GLB,, | Performed by: INTERNAL MEDICINE

## 2025-08-27 PROCEDURE — 99999 PR PBB SHADOW E&M-EST. PATIENT-LVL III: CPT | Mod: PBBFAC,HCNC,,

## 2025-09-03 ENCOUNTER — TELEPHONE (OUTPATIENT)
Dept: PRIMARY CARE CLINIC | Facility: CLINIC | Age: 65
End: 2025-09-03
Payer: MEDICARE